# Patient Record
Sex: FEMALE | Race: WHITE | NOT HISPANIC OR LATINO | Employment: FULL TIME | URBAN - METROPOLITAN AREA
[De-identification: names, ages, dates, MRNs, and addresses within clinical notes are randomized per-mention and may not be internally consistent; named-entity substitution may affect disease eponyms.]

---

## 2017-03-30 ENCOUNTER — CONVERSION ENCOUNTER (OUTPATIENT)
Dept: FAMILY MEDICINE CLINIC | Facility: CLINIC | Age: 27
End: 2017-03-30

## 2017-03-30 ENCOUNTER — HOSPITAL ENCOUNTER (OUTPATIENT)
Dept: FAMILY MEDICINE CLINIC | Facility: CLINIC | Age: 27
Setting detail: SPECIMEN
Discharge: HOME | End: 2017-03-30
Attending: FAMILY MEDICINE | Admitting: FAMILY MEDICINE

## 2017-03-30 LAB
ALBUMIN SERPL-MCNC: 3.7 G/DL (ref 3.5–4.8)
ALBUMIN/GLOB SERPL: 1.1 {RATIO} (ref 1–1.7)
ALP SERPL-CCNC: 43 IU/L (ref 32–91)
ALT SERPL-CCNC: 20 IU/L (ref 14–54)
ANION GAP SERPL CALC-SCNC: 12.1 MMOL/L (ref 10–20)
AST SERPL-CCNC: 20 IU/L (ref 15–41)
BILIRUB SERPL-MCNC: 0.4 MG/DL (ref 0.3–1.2)
BUN SERPL-MCNC: 8 MG/DL (ref 8–20)
BUN/CREAT SERPL: 11.4 (ref 5.4–26.2)
CALCIUM SERPL-MCNC: 9 MG/DL (ref 8.9–10.3)
CHLORIDE SERPL-SCNC: 105 MMOL/L (ref 101–111)
CHOLEST SERPL-MCNC: 166 MG/DL
CHOLEST/HDLC SERPL: 3.6 {RATIO}
CONV CO2: 25 MMOL/L (ref 22–32)
CONV LDL CHOLESTEROL DIRECT: 103 MG/DL (ref 0–100)
CONV TOTAL PROTEIN: 7.1 G/DL (ref 6.1–7.9)
CREAT UR-MCNC: 0.7 MG/DL (ref 0.4–1)
GLOBULIN UR ELPH-MCNC: 3.4 G/DL (ref 2.5–3.8)
GLUCOSE SERPL-MCNC: 81 MG/DL (ref 65–99)
HDLC SERPL-MCNC: 46 MG/DL
LDLC/HDLC SERPL: 2.3 {RATIO}
LIPID INTERPRETATION: ABNORMAL
POTASSIUM SERPL-SCNC: 4.1 MMOL/L (ref 3.6–5.1)
SODIUM SERPL-SCNC: 138 MMOL/L (ref 136–144)
TRIGL SERPL-MCNC: 85 MG/DL
TSH SERPL-ACNC: 1.66 UIU/ML (ref 0.34–5.6)
VLDLC SERPL CALC-MCNC: 17.8 MG/DL

## 2017-09-25 ENCOUNTER — OFFICE VISIT (OUTPATIENT)
Dept: OBSTETRICS AND GYNECOLOGY | Age: 27
End: 2017-09-25

## 2017-09-25 VITALS
DIASTOLIC BLOOD PRESSURE: 80 MMHG | BODY MASS INDEX: 36.43 KG/M2 | SYSTOLIC BLOOD PRESSURE: 118 MMHG | HEIGHT: 69 IN | WEIGHT: 246 LBS

## 2017-09-25 DIAGNOSIS — Z31.69 ENCOUNTER FOR PRECONCEPTION CONSULTATION: ICD-10-CM

## 2017-09-25 DIAGNOSIS — Z01.419 ENCOUNTER FOR GYNECOLOGICAL EXAMINATION WITHOUT ABNORMAL FINDING: Primary | ICD-10-CM

## 2017-09-25 PROCEDURE — 99395 PREV VISIT EST AGE 18-39: CPT | Performed by: OBSTETRICS & GYNECOLOGY

## 2017-09-25 NOTE — PROGRESS NOTES
"Subjective     Chief Complaint   Patient presents with   • Gynecologic Exam     AC       History of Present Illness    Marisa Case is a 26 y.o.  who presents for annual exam. Patient has stopped her oral contraceptive pills and is using condoms for birth control.  She plans to start to try for pregnancy in January.  Patient works as a .  She is .  She is following a good diet and going to the gym.  She does have a history of chickenpox and no family history of any genetic disorders in  Her menses are regular every 28-30 days, lasting 4-7 days, dysmenorrhea mild, occurring first 1-2 days of flow   Obstetric History:  OB History      Para Term  AB Living    0 0 0 0 0 0    SAB TAB Ectopic Multiple Live Births    0 0 0 0          Menstrual History:     Patient's last menstrual period was 2017.         Current contraception: condoms  History of abnormal Pap smear: no  LPS 2016   Received Gardasil immunization: yes  Perform regular self breast exam: no  Family history of uterine or ovarian cancer: no  Family History of colon cancer: no  Family history of breast cancer: no    Mammogram: not indicated.  Colonoscopy: not indicated.  DEXA: not indicated.    Exercise: exercises 4 times a week GYM   Calcium/Vitamin D: adequate intake    The following portions of the patient's history were reviewed and updated as appropriate: allergies, current medications, past family history, past medical history, past social history, past surgical history and problem list.    Review of Systems    Review of Systems   Constitutional: Negative for fatigue.   Respiratory: Negative for shortness of breath.    Gastrointestinal: Negative for abdominal pain.   Genitourinary: Negative for dysuria.   Neurological: Negative for headaches.   Psychiatric/Behavioral: Negative for dysphoric mood.         Objective   Physical Exam    /80  Ht 69\" (175.3 cm)  Wt 246 lb (112 kg)  LMP 2017  " BMI 36.33 kg/m2    General:   alert, appears stated age, cooperative and mildly obese   Neck: no adenopathy and thyroid normal to palpation   Heart: regular rate and rhythm   Lungs: clear to auscultation bilaterally   Abdomen: soft, non-tender, without masses or organomegaly   Breast: inspection negative, no nipple discharge or bleeding, no masses or nodularity palpable   Vulva: normal, Bartholin's, Urethra, [place]'s normal   Vagina: normal mucosa, normal discharge   Cervix: no cervical motion tenderness and no lesions   Uterus: mobile, non-tender, normal shape and consistency   Adnexa: no mass, fullness, tenderness   Rectal: not indicated     Assessment/Plan   Marisa was seen today for gynecologic exam.    Diagnoses and all orders for this visit:    Encounter for gynecological examination without abnormal finding    Encounter for preconception consultation  -     Rubella Antibody, IgG  -     TSH  -     Hemoglobin A1c  -     Lipid Panel    I encouraged patient to have diet and exercise changes to bring down her body mass index.  We will check a conception labs today.  All questions answered.  Breast self exam technique reviewed and patient encouraged to perform self-exam monthly.  Discussed healthy lifestyle modifications.  Recommended 30 minutes of aerobic exercise five times per week.  Discussed calcium needs to prevent osteoporosis.

## 2017-09-26 LAB
CHOLEST SERPL-MCNC: 172 MG/DL (ref 0–200)
HBA1C MFR BLD: 5.3 % (ref 4.8–5.6)
HDLC SERPL-MCNC: 42 MG/DL (ref 40–60)
LDLC SERPL CALC-MCNC: 92 MG/DL (ref 0–100)
RUBV IGG SERPL IA-ACNC: 5.93 INDEX
TRIGL SERPL-MCNC: 191 MG/DL (ref 0–150)
TSH SERPL DL<=0.005 MIU/L-ACNC: 1.93 MIU/ML (ref 0.27–4.2)
VLDLC SERPL CALC-MCNC: 38.2 MG/DL (ref 5–40)

## 2017-09-27 ENCOUNTER — TELEPHONE (OUTPATIENT)
Dept: OBSTETRICS AND GYNECOLOGY | Age: 27
End: 2017-09-27

## 2017-09-27 NOTE — TELEPHONE ENCOUNTER
----- Message from Marcelo Valdez MD sent at 9/26/2017  5:57 PM EDT -----  Please notify that blood work is normal except for triglycerides but patient was not fasting.

## 2018-01-23 ENCOUNTER — CONVERSION ENCOUNTER (OUTPATIENT)
Dept: FAMILY MEDICINE CLINIC | Facility: CLINIC | Age: 28
End: 2018-01-23

## 2018-02-05 ENCOUNTER — INITIAL PRENATAL (OUTPATIENT)
Dept: OBSTETRICS AND GYNECOLOGY | Age: 28
End: 2018-02-05

## 2018-02-05 ENCOUNTER — PROCEDURE VISIT (OUTPATIENT)
Dept: OBSTETRICS AND GYNECOLOGY | Age: 28
End: 2018-02-05

## 2018-02-05 VITALS — DIASTOLIC BLOOD PRESSURE: 74 MMHG | SYSTOLIC BLOOD PRESSURE: 120 MMHG | WEIGHT: 239 LBS | BODY MASS INDEX: 35.29 KG/M2

## 2018-02-05 DIAGNOSIS — Z3A.09 PREGNANCY WITH 9 COMPLETED WEEKS GESTATION: Primary | ICD-10-CM

## 2018-02-05 DIAGNOSIS — O02.1 MISSED AB: ICD-10-CM

## 2018-02-05 DIAGNOSIS — Z11.3 SCREEN FOR SEXUALLY TRANSMITTED DISEASES: ICD-10-CM

## 2018-02-05 DIAGNOSIS — O36.80X0 ENCOUNTER TO DETERMINE FETAL VIABILITY OF PREGNANCY, SINGLE OR UNSPECIFIED FETUS: Primary | ICD-10-CM

## 2018-02-05 LAB — VZV IGG SER QL: NORMAL

## 2018-02-05 PROCEDURE — 99213 OFFICE O/P EST LOW 20 MIN: CPT | Performed by: OBSTETRICS & GYNECOLOGY

## 2018-02-05 PROCEDURE — 76817 TRANSVAGINAL US OBSTETRIC: CPT | Performed by: OBSTETRICS & GYNECOLOGY

## 2018-02-05 RX ORDER — DOCONEXENT, NIACINAMIDE, .ALPHA.-TOCOPHEROL ACETATE, DL-, CHOLECALCIFEROL, .BETA.-CAROTENE, ASCORBIC ACID, THIAMINE MONONITRATE, RIBOFLAVIN, PYRIDOXINE HYDROCHLORIDE, CYANOCOBALAMIN, IRON, ZINC OXIDE, CUPRIC OXIDE, POTASSIUM IODIDE, MAGNESIUM OXIDE, FOLIC ACID, AND LEVOMEFOLATE CALCIUM 200; 15; 20; 1000; 1100; 30; 1.6; 1.8; 2.5; 12; 29; 25; 2; 150; 20; .4; .6 MG/1; MG/1; [IU]/1; [IU]/1; [IU]/1; MG/1; MG/1; MG/1; MG/1; UG/1; MG/1; MG/1; MG/1; UG/1; MG/1; MG/1; MG/1
200 CAPSULE, LIQUID FILLED ORAL DAILY
Qty: 30 CAPSULE | Refills: 11 | Status: SHIPPED | OUTPATIENT
Start: 2018-02-05 | End: 2019-05-02 | Stop reason: SDUPTHER

## 2018-02-05 NOTE — PROGRESS NOTES
Subjective     Marisa Case is being seen today for her first obstetrical visit.  Patient is here today with her  Hunter.  Patient works as a .  She has been exercising and following a good diet.  She recently had the flu despite getting the flu vaccination.  She is feeling better now.  This is a planned pregnancy. She is at 9w5d gestation.     Menstrual History:  OB History      Para Term  AB Living    1 0 0 0 0 0    SAB TAB Ectopic Multiple Live Births    0 0 0 0            Patient's last menstrual period was 2017 (exact date).       The following portions of the patient's history were reviewed and updated as appropriate: allergies, current medications, past family history, past medical history, past social history, past surgical history and problem list.    Review of Systems  No vaginal bleeding or cramping.  Positive fatigue.  Mild nausea but no vomiting.      Objective     /74  Wt 108 kg (239 lb)  LMP 2017 (Exact Date)  BMI 35.29 kg/m2  General appearance: alert, appears stated age and cooperative  Eyes: negative findings: lids and lashes normal, conjunctivae and sclerae normal and corneas clear  Neck: no adenopathy, supple, symmetrical, trachea midline and thyroid not enlarged, symmetric, no tenderness/mass/nodules  Lungs: clear to auscultation bilaterally  Heart: regular rate and rhythm  Pelvic: cervix normal in appearance, external genitalia normal, no adnexal masses or tenderness, no cervical motion tenderness and Pelvic exam is difficult to evaluate due to body habitus.  The uterus sits high in the pelvis.  Extremities: extremities normal, atraumatic, no cyanosis or edema        Ultrasound shows a 7 week missed AB.  No cardiac activity is seen.    The crown-rump length is 94 mm no Doppler flow.    Assessment  7 week missed AB        Plan     Initial labs drawn.  Miscarriage was discussed in detail with the patient and her .  We  discussed that miscarriage occurs in about 15% of pregnancies.  Patient was given the option of expectant management, Cytotec or D&C.  Patient wishes to proceed with D&C due to leaving town for a trip in about 1 week.  We will schedule surgery.  The risk and benefits of surgery including but not limited to risk of anesthesia, bleeding, infection, uterine perforation and scarring of the endometrium were discussed.  Suction D&C will be planned.  Patient believes she is Rh-.  Labs were sent off today.  We discussed need for Preston exam with bleeding or D&C.    80% of 40 min visit spent on counseling and coordination of care.

## 2018-02-06 ENCOUNTER — TELEPHONE (OUTPATIENT)
Dept: OBSTETRICS AND GYNECOLOGY | Age: 28
End: 2018-02-06

## 2018-02-06 PROBLEM — O26.899 RH NEGATIVE, MATERNAL: Status: ACTIVE | Noted: 2018-02-06

## 2018-02-06 PROBLEM — Z67.91 RH NEGATIVE, MATERNAL: Status: ACTIVE | Noted: 2018-02-06

## 2018-02-06 LAB
ABO GROUP BLD: (no result)
BASOPHILS # BLD AUTO: 0 X10E3/UL (ref 0–0.2)
BASOPHILS NFR BLD AUTO: 0 %
BLD GP AB SCN SERPL QL: NEGATIVE
EOSINOPHIL # BLD AUTO: 0.1 X10E3/UL (ref 0–0.4)
EOSINOPHIL NFR BLD AUTO: 1 %
ERYTHROCYTE [DISTWIDTH] IN BLOOD BY AUTOMATED COUNT: 14.4 % (ref 12.3–15.4)
HBV SURFACE AG SERPL QL IA: NEGATIVE
HCT VFR BLD AUTO: 37.6 % (ref 34–46.6)
HGB BLD-MCNC: 12.7 G/DL (ref 11.1–15.9)
HIV 1+2 AB+HIV1 P24 AG SERPL QL IA: NON REACTIVE
IMM GRANULOCYTES # BLD: 0 X10E3/UL (ref 0–0.1)
IMM GRANULOCYTES NFR BLD: 0 %
LYMPHOCYTES # BLD AUTO: 2.8 X10E3/UL (ref 0.7–3.1)
LYMPHOCYTES NFR BLD AUTO: 22 %
MCH RBC QN AUTO: 29.2 PG (ref 26.6–33)
MCHC RBC AUTO-ENTMCNC: 33.8 G/DL (ref 31.5–35.7)
MCV RBC AUTO: 86 FL (ref 79–97)
MONOCYTES # BLD AUTO: 0.7 X10E3/UL (ref 0.1–0.9)
MONOCYTES NFR BLD AUTO: 6 %
NEUTROPHILS # BLD AUTO: 8.8 X10E3/UL (ref 1.4–7)
NEUTROPHILS NFR BLD AUTO: 71 %
PLATELET # BLD AUTO: 462 X10E3/UL (ref 150–379)
RBC # BLD AUTO: 4.35 X10E6/UL (ref 3.77–5.28)
RH BLD: NEGATIVE
RPR SER QL: NON REACTIVE
RUBV IGG SERPL IA-ACNC: 6.94 INDEX
WBC # BLD AUTO: 12.5 X10E3/UL (ref 3.4–10.8)

## 2018-02-06 PROCEDURE — S0260 H&P FOR SURGERY: HCPCS | Performed by: OBSTETRICS & GYNECOLOGY

## 2018-02-06 NOTE — H&P
Patient Care Team:  Sabina Mendoza DO as PCP - General (Family Medicine)    Chief complaint missed AB    Subjective     History of Present Illness  patient is a 27-year-old  1 para 0 with a 7 week missed AB.  Patient came in for her first pregnancy visit.  She was supposed to be about 9-1/2 weeks pregnant but ultrasound revealed a 7 week fetal pole with no cardiac activity.  Patient has not had bleeding or pain.  She is Rh- and will require O Wagner.  Patient is otherwise healthy.     Review of Systems   Constitutional: Negative.    Respiratory: Negative.    Cardiovascular: Negative.    Genitourinary: Negative.         Past Medical History:   Diagnosis Date   • Dysmenorrhea    • Miscarriage      Past Surgical History:   Procedure Laterality Date   • WISDOM TOOTH EXTRACTION       Family History   Problem Relation Age of Onset   • No Known Problems Mother    • No Known Problems Father    • Cancer Maternal Grandfather    • Malig Hyperthermia Neg Hx      Social History   Substance Use Topics   • Smoking status: Never Smoker   • Smokeless tobacco: None   • Alcohol use No     No prescriptions prior to admission.     Allergies:  Penicillins    Objective      Vital Signs       Physical Exam   Constitutional: She appears well-developed and well-nourished.   Cardiovascular: Normal rate and regular rhythm.    Pulmonary/Chest: Effort normal and breath sounds normal.   Abdominal: Soft. Bowel sounds are normal.   Genitourinary:   Genitourinary Comments: Uterus at times the pelvis is enlarged to about 7 weeks size.   Skin: Skin is warm and dry.   Psychiatric: She has a normal mood and affect. Her behavior is normal.       Results Review:   I reviewed the patient's new clinical results.      Assessment/Plan     Principal Problem:    Missed ab    Patient Active Problem List   Diagnosis   • Missed ab   • Rh negative, maternal         Assessment & Plan  Patient was given the option of expectant management  versus Cytotec versus suction D&C.  Patient is leaving town at the end of the week and is anxious about significant bleeding when she leaves.  She would like to proceed with D&C.  We discussed the procedure in detail including risk and benefits.  Patient will also need for Wagner since she is Rh-.  I discussed the patients findings and my recommendations with patient and family    Marcelo Valdez MD  02/06/18  6:49 PM

## 2018-02-06 NOTE — TELEPHONE ENCOUNTER
----- Message from Marcelo Valdez MD sent at 2/6/2018 12:50 PM EST -----  Please notify blood work is normal.  Her blood type is A-.  Patient has a D&C for miscarriage tomorrow.

## 2018-02-07 ENCOUNTER — HOSPITAL ENCOUNTER (OUTPATIENT)
Facility: HOSPITAL | Age: 28
Setting detail: HOSPITAL OUTPATIENT SURGERY
Discharge: HOME | End: 2018-02-07
Attending: OBSTETRICS & GYNECOLOGY | Admitting: OBSTETRICS & GYNECOLOGY

## 2018-02-07 ENCOUNTER — ANESTHESIA EVENT (OUTPATIENT)
Dept: PERIOP | Facility: HOSPITAL | Age: 28
End: 2018-02-07

## 2018-02-07 ENCOUNTER — ANESTHESIA (OUTPATIENT)
Dept: PERIOP | Facility: HOSPITAL | Age: 28
End: 2018-02-07

## 2018-02-07 VITALS
TEMPERATURE: 98.4 F | SYSTOLIC BLOOD PRESSURE: 131 MMHG | RESPIRATION RATE: 18 BRPM | HEART RATE: 86 BPM | DIASTOLIC BLOOD PRESSURE: 81 MMHG | OXYGEN SATURATION: 97 %

## 2018-02-07 DIAGNOSIS — O02.1 MISSED AB: ICD-10-CM

## 2018-02-07 LAB
BACTERIA UR CULT: NORMAL
BACTERIA UR CULT: NORMAL

## 2018-02-07 PROCEDURE — 25010000002 FENTANYL CITRATE (PF) 100 MCG/2ML SOLUTION: Performed by: NURSE ANESTHETIST, CERTIFIED REGISTERED

## 2018-02-07 PROCEDURE — 25010000002 DEXAMETHASONE PER 1 MG: Performed by: NURSE ANESTHETIST, CERTIFIED REGISTERED

## 2018-02-07 PROCEDURE — 25010000002 KETOROLAC TROMETHAMINE PER 15 MG: Performed by: NURSE ANESTHETIST, CERTIFIED REGISTERED

## 2018-02-07 PROCEDURE — 25010000002 MIDAZOLAM PER 1 MG: Performed by: ANESTHESIOLOGY

## 2018-02-07 PROCEDURE — 59820 CARE OF MISCARRIAGE: CPT | Performed by: OBSTETRICS & GYNECOLOGY

## 2018-02-07 PROCEDURE — 25010000002 PROPOFOL 10 MG/ML EMULSION: Performed by: NURSE ANESTHETIST, CERTIFIED REGISTERED

## 2018-02-07 PROCEDURE — 88305 TISSUE EXAM BY PATHOLOGIST: CPT | Performed by: OBSTETRICS & GYNECOLOGY

## 2018-02-07 PROCEDURE — 25010000002 RHO D IMMUNE GLOBULIN 1500 UNIT/2ML SOLUTION PREFILLED SYRINGE: Performed by: OBSTETRICS & GYNECOLOGY

## 2018-02-07 PROCEDURE — 25010000002 ONDANSETRON PER 1 MG: Performed by: NURSE ANESTHETIST, CERTIFIED REGISTERED

## 2018-02-07 RX ORDER — OXYCODONE HYDROCHLORIDE AND ACETAMINOPHEN 5; 325 MG/1; MG/1
1 TABLET ORAL ONCE AS NEEDED
Status: DISCONTINUED | OUTPATIENT
Start: 2018-02-07 | End: 2018-02-07 | Stop reason: HOSPADM

## 2018-02-07 RX ORDER — KETOROLAC TROMETHAMINE 30 MG/ML
INJECTION, SOLUTION INTRAMUSCULAR; INTRAVENOUS AS NEEDED
Status: DISCONTINUED | OUTPATIENT
Start: 2018-02-07 | End: 2018-02-07 | Stop reason: SURG

## 2018-02-07 RX ORDER — ACETAMINOPHEN 650 MG/1
650 SUPPOSITORY RECTAL ONCE AS NEEDED
Status: DISCONTINUED | OUTPATIENT
Start: 2018-02-07 | End: 2018-02-07 | Stop reason: HOSPADM

## 2018-02-07 RX ORDER — PROMETHAZINE HYDROCHLORIDE 25 MG/ML
6.25 INJECTION, SOLUTION INTRAMUSCULAR; INTRAVENOUS ONCE AS NEEDED
Status: DISCONTINUED | OUTPATIENT
Start: 2018-02-07 | End: 2018-02-07 | Stop reason: HOSPADM

## 2018-02-07 RX ORDER — DIPHENHYDRAMINE HYDROCHLORIDE 50 MG/ML
12.5 INJECTION INTRAMUSCULAR; INTRAVENOUS
Status: DISCONTINUED | OUTPATIENT
Start: 2018-02-07 | End: 2018-02-07 | Stop reason: HOSPADM

## 2018-02-07 RX ORDER — PROMETHAZINE HYDROCHLORIDE 25 MG/1
25 SUPPOSITORY RECTAL ONCE AS NEEDED
Status: DISCONTINUED | OUTPATIENT
Start: 2018-02-07 | End: 2018-02-07 | Stop reason: HOSPADM

## 2018-02-07 RX ORDER — IBUPROFEN 600 MG/1
600 TABLET ORAL EVERY 6 HOURS PRN
Qty: 30 TABLET | Refills: 0 | Status: SHIPPED | OUTPATIENT
Start: 2018-02-07 | End: 2018-02-23

## 2018-02-07 RX ORDER — FENTANYL CITRATE 50 UG/ML
50 INJECTION, SOLUTION INTRAMUSCULAR; INTRAVENOUS
Status: DISCONTINUED | OUTPATIENT
Start: 2018-02-07 | End: 2018-02-07 | Stop reason: HOSPADM

## 2018-02-07 RX ORDER — MAGNESIUM HYDROXIDE 1200 MG/15ML
LIQUID ORAL AS NEEDED
Status: DISCONTINUED | OUTPATIENT
Start: 2018-02-07 | End: 2018-02-07 | Stop reason: HOSPADM

## 2018-02-07 RX ORDER — LIDOCAINE HYDROCHLORIDE 10 MG/ML
0.5 INJECTION, SOLUTION EPIDURAL; INFILTRATION; INTRACAUDAL; PERINEURAL ONCE AS NEEDED
Status: DISCONTINUED | OUTPATIENT
Start: 2018-02-07 | End: 2018-02-07 | Stop reason: HOSPADM

## 2018-02-07 RX ORDER — NALBUPHINE HCL 10 MG/ML
2 AMPUL (ML) INJECTION EVERY 4 HOURS PRN
Status: DISCONTINUED | OUTPATIENT
Start: 2018-02-07 | End: 2018-02-07 | Stop reason: HOSPADM

## 2018-02-07 RX ORDER — DEXAMETHASONE SODIUM PHOSPHATE 10 MG/ML
INJECTION INTRAMUSCULAR; INTRAVENOUS AS NEEDED
Status: DISCONTINUED | OUTPATIENT
Start: 2018-02-07 | End: 2018-02-07 | Stop reason: SURG

## 2018-02-07 RX ORDER — MIDAZOLAM HYDROCHLORIDE 1 MG/ML
2 INJECTION INTRAMUSCULAR; INTRAVENOUS
Status: DISCONTINUED | OUTPATIENT
Start: 2018-02-07 | End: 2018-02-07 | Stop reason: HOSPADM

## 2018-02-07 RX ORDER — FENTANYL CITRATE 50 UG/ML
INJECTION, SOLUTION INTRAMUSCULAR; INTRAVENOUS AS NEEDED
Status: DISCONTINUED | OUTPATIENT
Start: 2018-02-07 | End: 2018-02-07 | Stop reason: SURG

## 2018-02-07 RX ORDER — FAMOTIDINE 10 MG/ML
20 INJECTION, SOLUTION INTRAVENOUS ONCE
Status: COMPLETED | OUTPATIENT
Start: 2018-02-07 | End: 2018-02-07

## 2018-02-07 RX ORDER — NALOXONE HCL 0.4 MG/ML
0.4 VIAL (ML) INJECTION AS NEEDED
Status: DISCONTINUED | OUTPATIENT
Start: 2018-02-07 | End: 2018-02-07 | Stop reason: HOSPADM

## 2018-02-07 RX ORDER — MIDAZOLAM HYDROCHLORIDE 1 MG/ML
1 INJECTION INTRAMUSCULAR; INTRAVENOUS
Status: DISCONTINUED | OUTPATIENT
Start: 2018-02-07 | End: 2018-02-07 | Stop reason: HOSPADM

## 2018-02-07 RX ORDER — SODIUM CHLORIDE, SODIUM LACTATE, POTASSIUM CHLORIDE, CALCIUM CHLORIDE 600; 310; 30; 20 MG/100ML; MG/100ML; MG/100ML; MG/100ML
9 INJECTION, SOLUTION INTRAVENOUS CONTINUOUS
Status: DISCONTINUED | OUTPATIENT
Start: 2018-02-07 | End: 2018-02-07 | Stop reason: HOSPADM

## 2018-02-07 RX ORDER — PROMETHAZINE HYDROCHLORIDE 25 MG/1
25 TABLET ORAL ONCE AS NEEDED
Status: DISCONTINUED | OUTPATIENT
Start: 2018-02-07 | End: 2018-02-07 | Stop reason: HOSPADM

## 2018-02-07 RX ORDER — OXYCODONE HYDROCHLORIDE AND ACETAMINOPHEN 5; 325 MG/1; MG/1
1 TABLET ORAL EVERY 4 HOURS PRN
Qty: 10 TABLET | Refills: 0 | Status: SHIPPED | OUTPATIENT
Start: 2018-02-07 | End: 2018-02-23

## 2018-02-07 RX ORDER — SODIUM CHLORIDE 0.9 % (FLUSH) 0.9 %
1-10 SYRINGE (ML) INJECTION AS NEEDED
Status: DISCONTINUED | OUTPATIENT
Start: 2018-02-07 | End: 2018-02-07 | Stop reason: HOSPADM

## 2018-02-07 RX ORDER — HYDRALAZINE HYDROCHLORIDE 20 MG/ML
5 INJECTION INTRAMUSCULAR; INTRAVENOUS
Status: DISCONTINUED | OUTPATIENT
Start: 2018-02-07 | End: 2018-02-07 | Stop reason: HOSPADM

## 2018-02-07 RX ORDER — ACETAMINOPHEN 325 MG/1
650 TABLET ORAL ONCE
Status: COMPLETED | OUTPATIENT
Start: 2018-02-07 | End: 2018-02-07

## 2018-02-07 RX ORDER — PROPOFOL 10 MG/ML
VIAL (ML) INTRAVENOUS AS NEEDED
Status: DISCONTINUED | OUTPATIENT
Start: 2018-02-07 | End: 2018-02-07 | Stop reason: SURG

## 2018-02-07 RX ORDER — LIDOCAINE HYDROCHLORIDE 20 MG/ML
INJECTION, SOLUTION INFILTRATION; PERINEURAL AS NEEDED
Status: DISCONTINUED | OUTPATIENT
Start: 2018-02-07 | End: 2018-02-07 | Stop reason: SURG

## 2018-02-07 RX ORDER — ACETAMINOPHEN 325 MG/1
650 TABLET ORAL ONCE AS NEEDED
Status: DISCONTINUED | OUTPATIENT
Start: 2018-02-07 | End: 2018-02-07 | Stop reason: HOSPADM

## 2018-02-07 RX ORDER — ONDANSETRON 2 MG/ML
INJECTION INTRAMUSCULAR; INTRAVENOUS AS NEEDED
Status: DISCONTINUED | OUTPATIENT
Start: 2018-02-07 | End: 2018-02-07 | Stop reason: SURG

## 2018-02-07 RX ORDER — NALBUPHINE HCL 10 MG/ML
10 AMPUL (ML) INJECTION EVERY 4 HOURS PRN
Status: DISCONTINUED | OUTPATIENT
Start: 2018-02-07 | End: 2018-02-07 | Stop reason: HOSPADM

## 2018-02-07 RX ADMIN — PROPOFOL 200 MG: 10 INJECTION, EMULSION INTRAVENOUS at 12:04

## 2018-02-07 RX ADMIN — FENTANYL CITRATE 50 MCG: 50 INJECTION INTRAMUSCULAR; INTRAVENOUS at 12:04

## 2018-02-07 RX ADMIN — FENTANYL CITRATE 25 MCG: 50 INJECTION INTRAMUSCULAR; INTRAVENOUS at 12:24

## 2018-02-07 RX ADMIN — HUMAN RHO(D) IMMUNE GLOBULIN 1500 UNITS: 1500 SOLUTION INTRAMUSCULAR; INTRAVENOUS at 13:11

## 2018-02-07 RX ADMIN — DEXAMETHASONE SODIUM PHOSPHATE 8 MG: 10 INJECTION INTRAMUSCULAR; INTRAVENOUS at 12:10

## 2018-02-07 RX ADMIN — LIDOCAINE HYDROCHLORIDE 100 MG: 20 INJECTION, SOLUTION INFILTRATION; PERINEURAL at 12:04

## 2018-02-07 RX ADMIN — SODIUM CHLORIDE, POTASSIUM CHLORIDE, SODIUM LACTATE AND CALCIUM CHLORIDE 9 ML/HR: 600; 310; 30; 20 INJECTION, SOLUTION INTRAVENOUS at 11:21

## 2018-02-07 RX ADMIN — ONDANSETRON 4 MG: 2 INJECTION INTRAMUSCULAR; INTRAVENOUS at 12:20

## 2018-02-07 RX ADMIN — FENTANYL CITRATE 25 MCG: 50 INJECTION INTRAMUSCULAR; INTRAVENOUS at 12:13

## 2018-02-07 RX ADMIN — KETOROLAC TROMETHAMINE 30 MG: 30 INJECTION, SOLUTION INTRAMUSCULAR; INTRAVENOUS at 12:20

## 2018-02-07 RX ADMIN — FAMOTIDINE 20 MG: 10 INJECTION INTRAVENOUS at 11:22

## 2018-02-07 RX ADMIN — MIDAZOLAM 2 MG: 1 INJECTION INTRAMUSCULAR; INTRAVENOUS at 11:22

## 2018-02-07 RX ADMIN — ACETAMINOPHEN 650 MG: 325 TABLET, FILM COATED ORAL at 11:21

## 2018-02-07 NOTE — PLAN OF CARE
Problem: Patient Care Overview (Adult)  Goal: Plan of Care Review  Outcome: Ongoing (interventions implemented as appropriate)   02/07/18 1001   Coping/Psychosocial Response Interventions   Plan Of Care Reviewed With patient   Patient Care Overview   Progress improving     Goal: Discharge Needs Assessment  Outcome: Ongoing (interventions implemented as appropriate)   02/07/18 1001   Discharge Needs Assessment   Concerns To Be Addressed no discharge needs identified   Equipment Needed After Discharge none   Self-Care   Equipment Currently Used at Home none

## 2018-02-07 NOTE — OP NOTE
Pre-operative Diagnosis: Seven-week missed AB  Post-operative Diagnosis: Same  Procedure: SuctionD&C  Anesthesia: Spinal  Surgeon(s): José Miguel  Findings: Anteverted uterus on exam under anesthesia.  No adnexal masses are palpated.  The uterus feels approximately 8 weeks size.  Products of conception are seen in the suction tubing.  Complications: none  Specimens: products of conception  EBL: 400ml    Description of Procedure:  The patient was taken to the Operating Room where anesthesia was found to be adequate. The patient was then placed in the dorsal lithotomy position and prepped and draped in the usual sterile fashion. A red rubber catheter was used to drain the urine from the bladder.  A weighted speculum was placed in the vagina and the cervix was visualized. The cervix grasped with the Allis clamp. The cervix was gradually dilated. The suction currettage 8mm size was passed approx 5 times gently, removing tissue. Sharp currette was done approximates 3 passes, gently.   The Allis clamp was removed, and the cervix was found to be hemostatic.   All instruments and retractors were removed. All sponge, instrument and needle counts were noted to be correct. The patient was taken to recovery in stable condition.

## 2018-02-07 NOTE — PLAN OF CARE
Problem: Patient Care Overview (Adult)  Goal: Plan of Care Review  Outcome: Outcome(s) achieved Date Met: 02/07/18    Goal: Discharge Needs Assessment  Outcome: Outcome(s) achieved Date Met: 02/07/18      Problem: Perioperative Period (Adult)  Goal: Signs and Symptoms of Listed Potential Problems Will be Absent or Manageable (Perioperative Period)  Outcome: Outcome(s) achieved Date Met: 02/07/18

## 2018-02-07 NOTE — ANESTHESIA PROCEDURE NOTES
Airway  Urgency: elective    Airway not difficult    General Information and Staff    Patient location during procedure: OR  Anesthesiologist: RENDER, ZAFAR RAY  CRNA: LYNNETTE TATUM    Indications and Patient Condition  Indications for airway management: airway protection    Preoxygenated: yes (Pt pre-O2 with 100% O2)  MILS not maintained throughout  Mask difficulty assessment: 0 - not attempted    Final Airway Details  Final airway type: supraglottic airway      Successful airway: classic  Size 4    Number of attempts at approach: 1    Additional Comments  ATOLMA x1. No change in dentition. + ETCO2. Airway seal pressure <20cm H2O.

## 2018-02-07 NOTE — ANESTHESIA POSTPROCEDURE EVALUATION
Patient: Marisa Case    Procedure Summary     Date Anesthesia Start Anesthesia Stop Room / Location    02/07/18 1203 1238  [facility] /  [facility]       Procedure Diagnosis Surgeon Provider    DILATATION AND CURETTAGE WITH SUCTION (N/A Vagina) Missed ab  (Missed ab [O02.1]) MD Anthony Gardner MD          Anesthesia Type: general  Last vitals  BP   131/81 (02/07/18 1327)   Temp   36.9 °C (98.4 °F) (02/07/18 1234)   Pulse   86 (02/07/18 1327)   Resp   18 (02/07/18 1327)     SpO2   97 % (02/07/18 1327)     Post Anesthesia Care and Evaluation    Patient participation: complete - patient participated  Level of consciousness: awake  Pain management: adequate  Airway patency: patent  Anesthetic complications: No anesthetic complications    Cardiovascular status: acceptable  Respiratory status: acceptable  Hydration status: acceptable

## 2018-02-07 NOTE — ANESTHESIA PREPROCEDURE EVALUATION
Anesthesia Evaluation     no history of anesthetic complications:  NPO Solid Status: > 8 hours             Airway   Mallampati: II  TM distance: >3 FB  Neck ROM: full  no difficulty expected  Dental - normal exam     Pulmonary - negative pulmonary ROS and normal exam   Cardiovascular - negative cardio ROS and normal exam    Rhythm: regular        Neuro/Psych- negative ROS  GI/Hepatic/Renal/Endo - negative ROS     Musculoskeletal     Abdominal    Substance History      OB/GYN    (+) Pregnant,     Comment: Missed AB      Other                        Anesthesia Plan    ASA 2     general   (  D/W R&B of GA including but not limited to: heart, lung, liver, kidney, neurologic problems, positioning injuries, dental damage, corneal abrasion and TMJ.  .)  intravenous induction   Anesthetic plan and risks discussed with patient.

## 2018-02-07 NOTE — PLAN OF CARE
Problem: Patient Care Overview (Adult)  Goal: Plan of Care Review  Outcome: Ongoing (interventions implemented as appropriate)   02/07/18 1319   Coping/Psychosocial Response Interventions   Plan Of Care Reviewed With patient   Patient Care Overview   Progress improving     Goal: Adult Individualization and Mutuality  Outcome: Ongoing (interventions implemented as appropriate)    Goal: Discharge Needs Assessment  Outcome: Ongoing (interventions implemented as appropriate)   02/07/18 1319   Discharge Needs Assessment   Concerns To Be Addressed no discharge needs identified       Problem: Perioperative Period (Adult)  Goal: Signs and Symptoms of Listed Potential Problems Will be Absent or Manageable (Perioperative Period)  Outcome: Ongoing (interventions implemented as appropriate)   02/07/18 1319   Perioperative Period   Problems Assessed (Perioperative Period) all   Problems Present (Perioperative Period) pain

## 2018-02-07 NOTE — PLAN OF CARE
Problem: Perioperative Period (Adult)  Goal: Signs and Symptoms of Listed Potential Problems Will be Absent or Manageable (Perioperative Period)  Outcome: Ongoing (interventions implemented as appropriate)   02/07/18 1001   Perioperative Period   Problems Assessed (Perioperative Period) all   Problems Present (Perioperative Period) none

## 2018-02-08 LAB
C TRACH RRNA SPEC QL NAA+PROBE: NEGATIVE
N GONORRHOEA RRNA SPEC QL NAA+PROBE: NEGATIVE

## 2018-02-09 ENCOUNTER — TELEPHONE (OUTPATIENT)
Dept: OBSTETRICS AND GYNECOLOGY | Age: 28
End: 2018-02-09

## 2018-02-09 DIAGNOSIS — O02.0 MOLAR PREGNANCY: Primary | ICD-10-CM

## 2018-02-09 NOTE — TELEPHONE ENCOUNTER
----- Message from Marcelo Valdez MD sent at 2/8/2018  7:11 PM EST -----  The patient has a possible molar pregnancy. I told the patient to come in for a quant BHCG on Friday and next Friday. Please order.

## 2018-02-10 ENCOUNTER — RESULTS ENCOUNTER (OUTPATIENT)
Dept: OBSTETRICS AND GYNECOLOGY | Age: 28
End: 2018-02-10

## 2018-02-10 DIAGNOSIS — O02.1 MISSED AB: ICD-10-CM

## 2018-02-10 LAB — HCG INTACT+B SERPL-ACNC: NORMAL MIU/ML

## 2018-02-13 PROBLEM — O02.0 MOLAR PREGNANCY: Status: RESOLVED | Noted: 2018-02-13 | Resolved: 2018-02-13

## 2018-02-13 PROBLEM — O08.89 PARTIAL MOLAR PREGNANCY: Status: ACTIVE | Noted: 2018-02-13

## 2018-02-13 PROBLEM — O02.0 MOLAR PREGNANCY: Status: ACTIVE | Noted: 2018-02-13

## 2018-02-13 LAB
LAB AP CASE REPORT: NORMAL
LAB AP INTRADEPARTMENTAL CONSULT: NORMAL
Lab: NORMAL
PATH REPORT.FINAL DX SPEC: NORMAL
PATH REPORT.GROSS SPEC: NORMAL

## 2018-02-16 ENCOUNTER — RESULTS ENCOUNTER (OUTPATIENT)
Dept: OBSTETRICS AND GYNECOLOGY | Age: 28
End: 2018-02-16

## 2018-02-16 DIAGNOSIS — O02.0 MOLAR PREGNANCY: ICD-10-CM

## 2018-02-16 LAB — HCG INTACT+B SERPL-ACNC: 551.7 MIU/ML

## 2018-02-19 ENCOUNTER — TELEPHONE (OUTPATIENT)
Dept: OBSTETRICS AND GYNECOLOGY | Age: 28
End: 2018-02-19

## 2018-02-19 DIAGNOSIS — O02.1 MISSED AB: Primary | ICD-10-CM

## 2018-02-19 NOTE — TELEPHONE ENCOUNTER
----- Message from Marcelo Valdez MD sent at 2/19/2018  8:22 AM EST -----  Please notify that beta hCG has fallen to 551.  This is a good decrease.  We will still need to check weekly have patient come in for quantitative beta hCG next Friday.

## 2018-02-21 ENCOUNTER — TELEPHONE (OUTPATIENT)
Dept: OBSTETRICS AND GYNECOLOGY | Age: 28
End: 2018-02-21

## 2018-02-21 NOTE — TELEPHONE ENCOUNTER
----- Message from Jeannette Alcantar MA sent at 2/21/2018 10:40 AM EST -----  Regarding: FW: Non-Urgent Medical Question  Contact: [phone]      ----- Message -----     From: Marisa Case     Sent: 2/21/2018  10:15 AM       [facility]  Subject: RE: Non-Urgent Medical Question                  Thank you for your response. I understand that it is difficult to know the effects of everything because this is not something that is routinely studied. My boss and I had decided that once I was pregnant to discontinue the methods because of this as a safety precaution. 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 We were just talking about this situation the other day after I had had the D&C performed and discovered the molar pregnancy. Since it will be quite some time before we can attempt another pregnancy, I was hoping to be able to resume some of my previous responsibilities for a small amount of time. Of course, I would discontinue them if I were to become pregnant again. My boss and I just weren't sure if we should wait till my hcg levels had dropped completely or not since I am having those levels monitored. I do use personal protective equipment (gloves, glasses, lab coat) and a ventilation silva when  performing these methods. I will pass along the information that you have given me to my boss. For now, I will not consult a high , but I will let you know if I change my mind on this. Thank you.  ----- Message -----  From: Marcelo Valdez MD  Sent: 2/21/2018  8:06 AM EST  To: Marisa Case  Subject: RE: Non-Urgent Medical Question    Marisa it is fairly difficult to know the effects of chemicals in pregnancy because there are not randomized control studies where women are exposed to chemicals and long-term effects are followed it for many years.    It very difficult to say anything is  "\"okay\".  If there is a way to avoid chemical exposures in pregnancy by doing different duties at her job that would be great.  I can also refer you to a high  to see if they have any information but I still think it would be difficult to quantify any effects.    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 When I found out that I was pregnant, my boss and I had decided that I would stop performing two methods as a safety precaution due to the chemicals that I was working with. The first method is an Ether/Fat Analysis that uses Ammonium Hydroxide, Ethyl Ether, and Petroleum Ether. The second method is a Metabolizable Urea Nitrogen Analysis that uses Trichloroacetic Acid. I wanted to double check with you to see what your suggestion might be regarding me performing these methods again with these chemicals?    Thank you,  Marisa"

## 2018-02-23 ENCOUNTER — OFFICE VISIT (OUTPATIENT)
Dept: OBSTETRICS AND GYNECOLOGY | Age: 28
End: 2018-02-23

## 2018-02-23 VITALS
HEIGHT: 68 IN | DIASTOLIC BLOOD PRESSURE: 80 MMHG | WEIGHT: 236 LBS | BODY MASS INDEX: 35.77 KG/M2 | SYSTOLIC BLOOD PRESSURE: 124 MMHG

## 2018-02-23 DIAGNOSIS — O08.89 PARTIAL MOLAR PREGNANCY: Primary | ICD-10-CM

## 2018-02-23 PROBLEM — O02.1 MISSED AB: Status: RESOLVED | Noted: 2018-02-05 | Resolved: 2018-02-23

## 2018-02-23 LAB — HCG INTACT+B SERPL-ACNC: 46.65 MIU/ML

## 2018-02-23 PROCEDURE — 99024 POSTOP FOLLOW-UP VISIT: CPT | Performed by: OBSTETRICS & GYNECOLOGY

## 2018-02-23 RX ORDER — LEVONORGESTREL AND ETHINYL ESTRADIOL 0.1-0.02MG
1 KIT ORAL DAILY
Qty: 28 TABLET | Refills: 12 | Status: SHIPPED | OUTPATIENT
Start: 2018-02-23 | End: 2018-08-27

## 2018-02-23 NOTE — PROGRESS NOTES
"Subjective     Marisa Case is a 27 y.o. female who presents to the office post D and C for MAB - path molar pregnancy.  Initial beta hCG went pathology report was seen was 13,000.  Follow-up one week later was 500.  Patient will have a third beta-hCG sent off today.  She's had a mild amount of spotting but is not having any pain.  She plans to use condoms for birth control.         Review of Systems  Pertinent items are noted in HPI.      Objective     /80  Ht 172.7 cm (68\")  Wt 107 kg (236 lb)  LMP 11/29/2017 (Exact Date)  Breastfeeding? Unknown  BMI 35.88 kg/m2  General:  alert, appears stated age and cooperative   Abdomen:    Incision:           Assessment     Partial molar pregnancy.  We will plan to follow beta hCGs down to 0 with weekly levels.  When the level has been 0 for 3 consistent weeks we will space out to once a month.  Patient will need to wait for 6 months after that point to try for pregnancy.  I recommend that she start oral contraceptive pills.   "

## 2018-02-27 ENCOUNTER — TELEPHONE (OUTPATIENT)
Dept: OBSTETRICS AND GYNECOLOGY | Age: 28
End: 2018-02-27

## 2018-02-27 DIAGNOSIS — O02.1 MISSED AB: Primary | ICD-10-CM

## 2018-02-27 NOTE — TELEPHONE ENCOUNTER
----- Message from Marcelo Valdez MD sent at 2/25/2018  9:21 AM EST -----  I notified the patient that her beta hCG is dropped to 46.  Please have the patient repeat beta hCG in 1 week.

## 2018-03-02 ENCOUNTER — TELEPHONE (OUTPATIENT)
Dept: OBSTETRICS AND GYNECOLOGY | Age: 28
End: 2018-03-02

## 2018-03-02 NOTE — TELEPHONE ENCOUNTER
----- Message from Marcelo Valdez MD sent at 3/2/2018  8:32 AM EST -----  Please notify beta hCG is down to 11.  She is being followed for post molar surveillance.  She will need a repeat beta hCG in 1 week.

## 2018-03-09 ENCOUNTER — TELEPHONE (OUTPATIENT)
Dept: OBSTETRICS AND GYNECOLOGY | Age: 28
End: 2018-03-09

## 2018-03-09 DIAGNOSIS — O09.A0 H/O MOLAR PREGNANCY, ANTEPARTUM: Primary | ICD-10-CM

## 2018-03-09 LAB — HCG INTACT+B SERPL-ACNC: 3.38 MIU/ML

## 2018-03-09 NOTE — TELEPHONE ENCOUNTER
----- Message from Marcelo Valdez MD sent at 3/9/2018  7:23 AM EST -----  Please notify beta hCG is now down to 3.  Continue weekly beta hCG levels for post molar surveillance.

## 2018-03-16 ENCOUNTER — TELEPHONE (OUTPATIENT)
Dept: OBSTETRICS AND GYNECOLOGY | Age: 28
End: 2018-03-16

## 2018-03-16 DIAGNOSIS — O02.0 MOLAR PREGNANCY: Primary | ICD-10-CM

## 2018-03-16 LAB — HCG INTACT+B SERPL-ACNC: 1.4 MIU/ML

## 2018-03-16 NOTE — TELEPHONE ENCOUNTER
----- Message from Marcelo Valdez MD sent at 3/16/2018  8:38 AM EDT -----  notify level is down to 1.4.  Repeat in 1 week.

## 2018-03-23 ENCOUNTER — TELEPHONE (OUTPATIENT)
Dept: OBSTETRICS AND GYNECOLOGY | Age: 28
End: 2018-03-23

## 2018-03-23 DIAGNOSIS — O02.0 MOLAR PREGNANCY: Primary | ICD-10-CM

## 2018-03-23 LAB — HCG INTACT+B SERPL-ACNC: 0.96 MIU/ML

## 2018-03-23 NOTE — TELEPHONE ENCOUNTER
----- Message from Marcelo Valdez MD sent at 3/23/2018  9:31 AM EDT -----  Please notify level is reading at 0.9.  Please have patient repeat in 1 week.

## 2018-03-30 ENCOUNTER — TELEPHONE (OUTPATIENT)
Dept: OBSTETRICS AND GYNECOLOGY | Age: 28
End: 2018-03-30

## 2018-03-30 DIAGNOSIS — O02.0 MOLAR PREGNANCY: Primary | ICD-10-CM

## 2018-03-30 LAB — HCG INTACT+B SERPL-ACNC: 0.71 MIU/ML

## 2018-03-30 NOTE — TELEPHONE ENCOUNTER
----- Message from Marcelo Valdez MD sent at 3/30/2018  8:12 AM EDT -----  Please notify beta is reading at 0.7.  Recommend repeat in 1 week.

## 2018-04-05 LAB — HCG INTACT+B SERPL-ACNC: 0.51 MIU/ML

## 2018-04-09 ENCOUNTER — TELEPHONE (OUTPATIENT)
Dept: OBSTETRICS AND GYNECOLOGY | Age: 28
End: 2018-04-09

## 2018-04-09 DIAGNOSIS — O02.0 MOLAR PREGNANCY: Primary | ICD-10-CM

## 2018-05-09 ENCOUNTER — TELEPHONE (OUTPATIENT)
Dept: OBSTETRICS AND GYNECOLOGY | Age: 28
End: 2018-05-09

## 2018-05-09 NOTE — TELEPHONE ENCOUNTER
----- Message from Marcelo Valdez MD sent at 5/9/2018  8:10 AM EDT -----  Please notify the level is negative.  Patient will need repeat monthly levels through September.

## 2018-06-04 DIAGNOSIS — O02.0 MOLAR PREGNANCY: Primary | ICD-10-CM

## 2018-06-05 LAB — HCG INTACT+B SERPL-ACNC: <0.5 MIU/ML

## 2018-06-21 ENCOUNTER — TELEPHONE (OUTPATIENT)
Dept: OBSTETRICS AND GYNECOLOGY | Age: 28
End: 2018-06-21

## 2018-06-21 NOTE — TELEPHONE ENCOUNTER
Pts insurance has dropped and [facility].  She just wants the MA to be aware so arrangements can be made for her monthly BHCG.  Due the first week of July.

## 2018-07-23 ENCOUNTER — TELEPHONE (OUTPATIENT)
Dept: OBSTETRICS AND GYNECOLOGY | Age: 28
End: 2018-07-23

## 2018-07-23 NOTE — TELEPHONE ENCOUNTER
----- Message from Rosa Arnold MA sent at 7/23/2018  1:31 PM EDT -----  Regarding: FW: Non-Urgent Medical Question  Contact: [phone]      ----- Message -----  From: Marisa Case  Sent: 7/23/2018   1:15 PM  [facility]  Subject: Non-Urgent Medical Question                      Good Afternoon,    Since I'm using [facility] for my monthly HCG blood test now, do I need to come in to your office and  new, signed paperwork every month to take with me to [facility]? I wanted to double check since I will be going over there again in a couple weeks.    Thanks,  Marisa

## 2018-08-06 ENCOUNTER — TELEPHONE (OUTPATIENT)
Dept: OBSTETRICS AND GYNECOLOGY | Age: 28
End: 2018-08-06

## 2018-08-27 ENCOUNTER — OFFICE VISIT (OUTPATIENT)
Dept: OBSTETRICS AND GYNECOLOGY | Age: 28
End: 2018-08-27

## 2018-08-27 VITALS
BODY MASS INDEX: 36.98 KG/M2 | SYSTOLIC BLOOD PRESSURE: 132 MMHG | HEIGHT: 68 IN | WEIGHT: 244 LBS | DIASTOLIC BLOOD PRESSURE: 80 MMHG

## 2018-08-27 DIAGNOSIS — Z31.69 ENCOUNTER FOR PRECONCEPTION CONSULTATION: ICD-10-CM

## 2018-08-27 DIAGNOSIS — O08.89 PARTIAL MOLAR PREGNANCY: Primary | ICD-10-CM

## 2018-08-27 PROCEDURE — 99212 OFFICE O/P EST SF 10 MIN: CPT | Performed by: OBSTETRICS & GYNECOLOGY

## 2018-08-27 NOTE — PROGRESS NOTES
"  Chief complaint-patient is here for follow-up of partial molar pregnancy.      History of present illness- Patient is a 27 y.o.  who is here for follow-up partial molar pregnancy.  She has followed her betas down to 0 and they have remained low for 6 months.  She would like to try for pregnancy.  She has a history of chickenpox.  Taking prenatal vitamins.  AE is due next month.    Review of Systems   Constitutional: Negative for fatigue.   Respiratory: Negative for shortness of breath.    Gastrointestinal: Negative for abdominal pain.   Genitourinary: Negative for dysuria.   Neurological: Negative for headaches.   Psychiatric/Behavioral: Negative for dysphoric mood.     /80   Ht 172.7 cm (68\")   Wt 111 kg (244 lb)   LMP 2018   BMI 37.10 kg/m²   Physical Exam   Constitutional: She appears well-developed and well-nourished. No distress.   Psychiatric: She has a normal mood and affect. Her behavior is normal. Judgment and thought content normal.           Marisa was seen today for follow-up.    Diagnoses and all orders for this visit:    Partial molar pregnancy    Encounter for preconception consultation     she will have her last beta hCG drawn today and we will call her with results.  If still negative she will try for pregnancy.  We discussed timing to increase chance of pregnancy.  She will call early due to history of molar pregnancy to check beta's.  "

## 2018-08-28 ENCOUNTER — TELEPHONE (OUTPATIENT)
Dept: OBSTETRICS AND GYNECOLOGY | Age: 28
End: 2018-08-28

## 2018-10-02 ENCOUNTER — OFFICE VISIT (OUTPATIENT)
Dept: OBSTETRICS AND GYNECOLOGY | Age: 28
End: 2018-10-02

## 2018-10-02 VITALS
HEIGHT: 68 IN | WEIGHT: 247.2 LBS | BODY MASS INDEX: 37.47 KG/M2 | SYSTOLIC BLOOD PRESSURE: 136 MMHG | DIASTOLIC BLOOD PRESSURE: 82 MMHG

## 2018-10-02 DIAGNOSIS — Z11.51 SPECIAL SCREENING EXAMINATION FOR HUMAN PAPILLOMAVIRUS (HPV): ICD-10-CM

## 2018-10-02 DIAGNOSIS — Z01.419 ENCOUNTER FOR GYNECOLOGICAL EXAMINATION WITHOUT ABNORMAL FINDING: Primary | ICD-10-CM

## 2018-10-02 DIAGNOSIS — Z12.4 ROUTINE CERVICAL SMEAR: ICD-10-CM

## 2018-10-02 PROBLEM — O08.89 PARTIAL MOLAR PREGNANCY: Status: RESOLVED | Noted: 2018-02-13 | Resolved: 2018-10-02

## 2018-10-02 PROBLEM — Z67.91 RH NEGATIVE, MATERNAL: Status: RESOLVED | Noted: 2018-02-06 | Resolved: 2018-10-02

## 2018-10-02 PROBLEM — O26.899 RH NEGATIVE, MATERNAL: Status: RESOLVED | Noted: 2018-02-06 | Resolved: 2018-10-02

## 2018-10-02 PROCEDURE — 99395 PREV VISIT EST AGE 18-39: CPT | Performed by: OBSTETRICS & GYNECOLOGY

## 2018-10-02 NOTE — PROGRESS NOTES
"Subjective     Chief Complaint   Patient presents with   • Gynecologic Exam     annual exam       History of Present Illness    Marisa Case is a 27 y.o.  who presents for annual exam.  Patient has a significant history of a partial molar pregnancy.  She had a D&C and we follow betas down to 0.  There were followed monthly.  She is now trying for pregnancy.  She has recently been tested and is rubella immune.   She has a history of chickenpox.  She has gained weight over the last year.  She is exercising regularly.  Her menses are regular every 28-30 days, lasting 4-7 days, dysmenorrhea moderate, occurring first 1-2 days of flow   Obstetric History:  OB History      Para Term  AB Living    1 0 0 0 1 0    SAB TAB Ectopic Molar Multiple Live Births    0 0 0 1 0           Menstrual History:     Patient's last menstrual period was 2018 (exact date).         Current contraception: none  History of abnormal Pap smear: no  Received Gardasil immunization: yes  Perform regular self breast exam: no  Family history of uterine or ovarian cancer: no  Family History of colon cancer: no  Family history of breast cancer: no    Mammogram: not indicated.  Colonoscopy: not indicated.  DEXA: not indicated.    Exercise: exercises 7 times a week  Calcium/Vitamin D: adequate intake    The following portions of the patient's history were reviewed and updated as appropriate: allergies, current medications, past family history, past medical history, past social history, past surgical history and problem list.    Review of Systems    Review of Systems   Constitutional: Negative for fatigue.   Respiratory: Negative for shortness of breath.    Gastrointestinal: Negative for abdominal pain.   Genitourinary: Negative for dysuria.   Neurological: Negative for headaches.   Psychiatric/Behavioral: Negative for dysphoric mood.         Objective   Physical Exam    /82   Ht 172.7 cm (68\")   Wt 112 kg (247 lb 3.2 " oz)   LMP 09/20/2018 (Exact Date)   BMI 37.59 kg/m²     General:   alert, appears stated age and cooperative   Neck: thyroid normal to palpation   Heart: regular rate and rhythm   Lungs: clear to auscultation bilaterally   Abdomen: soft, non-tender, without masses or organomegaly   Breast: inspection negative, no nipple discharge or bleeding, no masses or nodularity palpable   Vulva: normal, Bartholin's, Urethra, [place]'s normal   Vagina: normal mucosa, normal discharge   Cervix: no cervical motion tenderness and no lesions   Uterus: mobile, non-tender, normal shape and consistency   Adnexa: no mass, fullness, tenderness   Rectal: not indicated     Assessment/Plan   Marisa was seen today for gynecologic exam.    Diagnoses and all orders for this visit:    Encounter for gynecological examination without abnormal finding  -     IGP, Apt HPV,rfx 16 / 18,45    BMI 37.0-37.9, adult  -     TSH  -     Hemoglobin A1c    Special screening examination for human papillomavirus (HPV)  -     IGP, Apt HPV,rfx 16 / 18,45    Routine cervical smear  -     IGP, Apt HPV,rfx 16 / 18,45      Patient is having regular cycles.  We will check some labs today due to her body weight.  She will continue prenatal vitamins.  She will call with any positive pregnancy test due to history of molar pregnancy.  We will do an early ultrasound and early beta-hCGs.    All questions answered.  Breast self exam technique reviewed and patient encouraged to perform self-exam monthly.  Discussed healthy lifestyle modifications.  Recommended 30 minutes of aerobic exercise five times per week.  Discussed calcium needs to prevent osteoporosis.

## 2018-10-03 LAB
HBA1C MFR BLD: 5.3 % (ref 4.8–5.6)
TSH SERPL DL<=0.005 MIU/L-ACNC: 2.66 MIU/ML (ref 0.27–4.2)

## 2018-10-04 LAB
CYTOLOGIST CVX/VAG CYTO: NORMAL
CYTOLOGY CVX/VAG DOC THIN PREP: NORMAL
DX ICD CODE: NORMAL
HIV 1 & 2 AB SER-IMP: NORMAL
HPV I/H RISK 4 DNA CVX QL PROBE+SIG AMP: NEGATIVE
OTHER STN SPEC: NORMAL
PATH REPORT.FINAL DX SPEC: NORMAL
STAT OF ADQ CVX/VAG CYTO-IMP: NORMAL

## 2018-11-19 ENCOUNTER — TELEPHONE (OUTPATIENT)
Dept: OBSTETRICS AND GYNECOLOGY | Age: 28
End: 2018-11-19

## 2018-11-19 DIAGNOSIS — Z87.59 HISTORY OF MOLAR PREGNANCY: Primary | ICD-10-CM

## 2018-11-19 DIAGNOSIS — Z32.01 POSITIVE URINE PREGNANCY TEST: ICD-10-CM

## 2018-11-19 NOTE — TELEPHONE ENCOUNTER
Patient has a history of molar pregnancy.  Please have the patient come in for beta hCG today and Wednesday.

## 2018-11-20 ENCOUNTER — TELEPHONE (OUTPATIENT)
Dept: OBSTETRICS AND GYNECOLOGY | Age: 28
End: 2018-11-20

## 2018-11-20 ENCOUNTER — LAB (OUTPATIENT)
Dept: OBSTETRICS AND GYNECOLOGY | Age: 28
End: 2018-11-20

## 2018-11-20 LAB — HCG INTACT+B SERPL-ACNC: 14.99 MIU/ML

## 2018-11-20 PROCEDURE — 96372 THER/PROPH/DIAG INJ SC/IM: CPT | Performed by: OBSTETRICS & GYNECOLOGY

## 2018-11-20 NOTE — TELEPHONE ENCOUNTER
----- Message from Marcelo Valdez MD sent at 11/20/2018  9:19 AM EST -----  These notify level is an early positive at 16.  We will repeat on Wednesday.

## 2018-11-20 NOTE — TELEPHONE ENCOUNTER
Pt notd. She is c/o bright red bleeding just now, and cramping. Pt will come in tomorrow for repeat labs.

## 2018-11-26 ENCOUNTER — TELEPHONE (OUTPATIENT)
Dept: OBSTETRICS AND GYNECOLOGY | Age: 28
End: 2018-11-26

## 2018-11-26 DIAGNOSIS — O02.0 BLIGHTED OVUM: Primary | ICD-10-CM

## 2018-11-26 NOTE — TELEPHONE ENCOUNTER
----- Message from Marcelo Valdez MD sent at 11/21/2018  2:22 PM EST -----  I talked to the patient.  She is having bleeding and her beta decreased.  We discussed this is likely a very early blighted ovum.  She is not having any pain.  Please have the patient come in next week for another beta.

## 2018-11-29 ENCOUNTER — TELEPHONE (OUTPATIENT)
Dept: OBSTETRICS AND GYNECOLOGY | Age: 28
End: 2018-11-29

## 2018-11-29 LAB — HCG INTACT+B SERPL-ACNC: <0.5 MIU/ML

## 2018-11-29 NOTE — TELEPHONE ENCOUNTER
----- Message from Marcelo Valdez MD sent at 11/29/2018  8:59 AM EST -----  Please notify hCG level is now negative.

## 2019-04-25 ENCOUNTER — TELEPHONE (OUTPATIENT)
Dept: OBSTETRICS AND GYNECOLOGY | Age: 29
End: 2019-04-25

## 2019-04-25 DIAGNOSIS — Z87.59 HISTORY OF MOLAR PREGNANCY: Primary | ICD-10-CM

## 2019-04-25 DIAGNOSIS — N92.6 IRREGULAR MENSES: ICD-10-CM

## 2019-04-25 DIAGNOSIS — Z32.01 POSITIVE PREGNANCY TEST: ICD-10-CM

## 2019-04-25 NOTE — TELEPHONE ENCOUNTER
Pt was advised to call when she received a positive pregnancy test so she could be given special instructions. Please advise.

## 2019-04-25 NOTE — TELEPHONE ENCOUNTER
PT'S LMP WAS 3-27-19. PT NOTD TO [facility]/I TODAY FOR BHCG. PLEASE SCHEDULE APPT NEXT Thursday AT 10:30 FOR U/S AND DR PAPPAS

## 2019-04-26 ENCOUNTER — TELEPHONE (OUTPATIENT)
Dept: OBSTETRICS AND GYNECOLOGY | Age: 29
End: 2019-04-26

## 2019-04-26 LAB — HCG INTACT+B SERPL-ACNC: 74.78 MIU/ML

## 2019-04-26 NOTE — TELEPHONE ENCOUNTER
----- Message from Marcelo Valdez MD sent at 4/26/2019  9:05 AM EDT -----  Please notify hemoglobin A1c is in the normal range for 4 weeks pregnant at 74.  Keep appointment for ultrasound next week.

## 2019-05-02 ENCOUNTER — OFFICE VISIT (OUTPATIENT)
Dept: OBSTETRICS AND GYNECOLOGY | Age: 29
End: 2019-05-02

## 2019-05-02 ENCOUNTER — PROCEDURE VISIT (OUTPATIENT)
Dept: OBSTETRICS AND GYNECOLOGY | Age: 29
End: 2019-05-02

## 2019-05-02 VITALS
WEIGHT: 250 LBS | HEIGHT: 68 IN | SYSTOLIC BLOOD PRESSURE: 108 MMHG | DIASTOLIC BLOOD PRESSURE: 74 MMHG | BODY MASS INDEX: 37.89 KG/M2

## 2019-05-02 DIAGNOSIS — O36.80X0 ENCOUNTER TO DETERMINE FETAL VIABILITY OF PREGNANCY, SINGLE OR UNSPECIFIED FETUS: Primary | ICD-10-CM

## 2019-05-02 DIAGNOSIS — O20.0 THREATENED ABORTION: Primary | ICD-10-CM

## 2019-05-02 PROCEDURE — 99212 OFFICE O/P EST SF 10 MIN: CPT | Performed by: OBSTETRICS & GYNECOLOGY

## 2019-05-02 PROCEDURE — 76817 TRANSVAGINAL US OBSTETRIC: CPT | Performed by: OBSTETRICS & GYNECOLOGY

## 2019-05-02 RX ORDER — CETIRIZINE HYDROCHLORIDE 10 MG/1
10 TABLET ORAL DAILY
COMMUNITY
End: 2020-02-11

## 2019-05-02 NOTE — PROGRESS NOTES
"Subjective      Marisa Case is a 28 y.o. female who has a history of molar pregnancy.  I have asked her to come in early in pregnancy due to this history.  She has not experienced any bleeding.  She did have one beta drawn which was in appropriate range.  She should be 5 weeks 1 day by last menstrual period.  Cycle length: regular q 28 .  Pregnancy testing: quant HCG level  on 745.  Pregnancy imaging: not done.  Blood type: A negative.  Other lab results: none.    The following portions of the patient's history were reviewed and updated as appropriate: allergies and current medications.    Review of Systems  Pertinent items are noted in HPI.     Objective      /74   Ht 172.7 cm (68\")   Wt 113 kg (250 lb)   LMP 2019   BMI 38.01 kg/m²     General: alert, appears stated age and cooperative   Heart:    Lungs:    Abdomen:    Vulva:    Vagina:    Cervix:    Uterus:    Adnexa:      Imaging  Limited office ultrasound: Her sound shows a small gestational sac only.  No yolk sac or fetal pole seen ovaries appear normal.      Assessment/Plan     Marisa was seen today for follow-up.    Diagnoses and all orders for this visit:    Threatened   -     HCG, B-subunit, Quantitative      Patient has a history of molar pregnancy and will need to be followed closely.  She will repeat her beta today.  We will repeat ultrasound in 1 week.  We did discuss that no fetal pole is seen yet so we cannot accurately say if the pregnancy is intrauterine or date the pregnancy yet.  She will call with bleeding or pain.  Patient is Rh-.  "

## 2019-05-03 ENCOUNTER — TELEPHONE (OUTPATIENT)
Dept: OBSTETRICS AND GYNECOLOGY | Age: 29
End: 2019-05-03

## 2019-05-03 LAB — HCG INTACT+B SERPL-ACNC: NORMAL MIU/ML

## 2019-05-03 NOTE — TELEPHONE ENCOUNTER
----- Message from Marcelo Valdez MD sent at 5/3/2019  9:34 AM EDT -----  Please notify patient that hCG level looks good.  Level is at 1089 which is in range for 5 weeks of pregnancy

## 2019-05-09 ENCOUNTER — PROCEDURE VISIT (OUTPATIENT)
Dept: OBSTETRICS AND GYNECOLOGY | Age: 29
End: 2019-05-09

## 2019-05-09 ENCOUNTER — OFFICE VISIT (OUTPATIENT)
Dept: OBSTETRICS AND GYNECOLOGY | Age: 29
End: 2019-05-09

## 2019-05-09 VITALS
DIASTOLIC BLOOD PRESSURE: 76 MMHG | HEIGHT: 68 IN | BODY MASS INDEX: 38.19 KG/M2 | WEIGHT: 252 LBS | SYSTOLIC BLOOD PRESSURE: 120 MMHG

## 2019-05-09 DIAGNOSIS — O36.80X0 ENCOUNTER TO DETERMINE FETAL VIABILITY OF PREGNANCY, SINGLE OR UNSPECIFIED FETUS: Primary | ICD-10-CM

## 2019-05-09 DIAGNOSIS — O20.0 THREATENED ABORTION: Primary | ICD-10-CM

## 2019-05-09 PROCEDURE — 76817 TRANSVAGINAL US OBSTETRIC: CPT | Performed by: OBSTETRICS & GYNECOLOGY

## 2019-05-09 PROCEDURE — 99212 OFFICE O/P EST SF 10 MIN: CPT | Performed by: OBSTETRICS & GYNECOLOGY

## 2019-05-09 NOTE — PROGRESS NOTES
"Subjective      Marisa Case is a 28 y.o. female.  Patient has a history of a molar pregnancy.  She had an ultrasound last week that showed a 33 mm gestational sac with what appeared to be clear fluid.  There is no evidence of molar tissue but no yolk sac or fetal pole.  Patient reports fatigue but no nausea.  With her molar pregnancy she did have significant nausea and vomiting.  She has not had any vaginal bleeding    Beta-hCG level checked last week was 1082 in the 5-week range.    The following portions of the patient's history were reviewed and updated as appropriate: allergies and current medications.    Review of Systems  Pertinent items are noted in HPI.     Objective      /76   Ht 172.7 cm (68\")   Wt 114 kg (252 lb)   LMP 2019   BMI 38.32 kg/m²     General: alert, appears stated age and cooperative   Heart:    Lungs:    Abdomen:    Vulva:    Vagina:    Cervix:    Uterus:    Adnexa:      Imaging  Limited office ultrasound: Ultrasound shows a 0.92 cm gestational sac.  A yolk sac is seen but no fetal pole yet.  No adnexal masses are seen.      Assessment/Plan     Marisa was seen today for threatened miscarriage.    Diagnoses and all orders for this visit:    Threatened   -     Cancel: HCG, B-subunit, Quantitative; Future  -     HCG, B-subunit, Quantitative      Recheck beta-hCG today.    Patient will come back for viability ultrasound in 1 week.  Discussed with the patient I cannot fully exclude the chance of molar pregnancy however beta in the normal range is reassuring.  We also discussed there is still a risk of nonviable pregnancy.  We will continue to follow on ultrasound.  When fetal pole is able to be measured we can give the patient a due date.    10 minutes were spent in face-to-face consultation with the patient.  "

## 2019-05-10 ENCOUNTER — TELEPHONE (OUTPATIENT)
Dept: OBSTETRICS AND GYNECOLOGY | Age: 29
End: 2019-05-10

## 2019-05-10 LAB — HCG INTACT+B SERPL-ACNC: NORMAL MIU/ML

## 2019-05-10 NOTE — TELEPHONE ENCOUNTER
----- Message from Marcelo Valdez MD sent at 5/10/2019  8:36 AM EDT -----  Please notify beta hCG level has increased appropriately and is now at 8643.

## 2019-05-16 ENCOUNTER — PROCEDURE VISIT (OUTPATIENT)
Dept: OBSTETRICS AND GYNECOLOGY | Age: 29
End: 2019-05-16

## 2019-05-16 ENCOUNTER — OFFICE VISIT (OUTPATIENT)
Dept: OBSTETRICS AND GYNECOLOGY | Age: 29
End: 2019-05-16

## 2019-05-16 VITALS
DIASTOLIC BLOOD PRESSURE: 72 MMHG | SYSTOLIC BLOOD PRESSURE: 118 MMHG | HEIGHT: 68 IN | BODY MASS INDEX: 38.49 KG/M2 | WEIGHT: 254 LBS

## 2019-05-16 DIAGNOSIS — Z34.90 PREGNANCY, UNSPECIFIED GESTATIONAL AGE: ICD-10-CM

## 2019-05-16 DIAGNOSIS — O36.80X0 ENCOUNTER TO DETERMINE FETAL VIABILITY OF PREGNANCY, SINGLE OR UNSPECIFIED FETUS: Primary | ICD-10-CM

## 2019-05-16 DIAGNOSIS — O20.0 THREATENED ABORTION: Primary | ICD-10-CM

## 2019-05-16 PROBLEM — Z67.91 RH NEGATIVE STATUS DURING PREGNANCY: Status: ACTIVE | Noted: 2019-05-16

## 2019-05-16 PROBLEM — O26.899 RH NEGATIVE STATUS DURING PREGNANCY: Status: ACTIVE | Noted: 2019-05-16

## 2019-05-16 PROCEDURE — 76817 TRANSVAGINAL US OBSTETRIC: CPT | Performed by: OBSTETRICS & GYNECOLOGY

## 2019-05-16 PROCEDURE — 0501F PRENATAL FLOW SHEET: CPT | Performed by: OBSTETRICS & GYNECOLOGY

## 2019-05-16 NOTE — PROGRESS NOTES
"Subjective      Marisa Case is a 28 y.o. female.  History of a molar pregnancy.  We did check beta-hCG since her last visit and they have been increasing normally.  Patient reports no vaginal bleeding or pain.  She is taking vitamins with DHA.  She does report some fatigue and some nausea but does not want to take any medications.    She has allergies and does take antihistamines.        The following portions of the patient's history were reviewed and updated as appropriate: allergies and current medications.    Review of Systems  Pertinent items are noted in HPI.     Objective      /72   Ht 172.7 cm (68\")   Wt 115 kg (254 lb)   LMP 2019   BMI 38.62 kg/m²     General: alert, appears stated age and cooperative   Heart:    Lungs:    Abdomen:    Vulva:    Vagina:    Cervix:    Uterus:    Adnexa:      Imaging  Limited office ultrasound: Intrauterine pregnancy is seen today with a crown-rump length of 6 weeks 4 days.  There is positive fetal cardiac activity at 131 bpm.  Yolk sac and fetal pole are seen.  Ovaries appear normal.      Assessment/Plan     Marisa was seen today for follow-up.    Diagnoses and all orders for this visit:    Threatened       She has a history of of molar pregnancy.  Patient has ultrasound consistent with intrauterine pregnancy today which is very reassuring.  We did discuss risk of miscarriage in the first trimester.  Patient will return in 2 to 3 weeks for new OB visit.  Patient is Rh- and was told to report any bleeding.  We discussed treatments of nausea and vomiting of pregnancy.  "

## 2019-05-20 ENCOUNTER — TELEPHONE (OUTPATIENT)
Dept: OBSTETRICS AND GYNECOLOGY | Age: 29
End: 2019-05-20

## 2019-05-20 NOTE — TELEPHONE ENCOUNTER
Pt states she wrote through my chart requesting a call form bethany and I believe she has missed the call. Pt would like you to call her back.

## 2019-05-20 NOTE — TELEPHONE ENCOUNTER
Regarding: RE: Non-Urgent Medical Question  Contact: [phone]      ----- Message -----  From: Fabiola Perry MA  Sent: 5/20/2019   9:09 AM  To: Marcelo Valdez MD  Subject: RE: Non-Urgent Medical Question                  ----- Message from Fabiola Perry MA sent at 5/20/2019  9:09 AM EDT -----       ----- Message sent from Jeannette Alcantar MA to Marisa Case at 5/20/2019  8:21 AM -----   Per guidelines dictated by your physician, addressing medical questions via email is not appropriate.      Due to system issues, you may not receive a timely response to your message.      If you have a possible medical emergency please call 911 or proceed to the ER for evaluation.      If you have a routine medical question, call the office during business hours and request that your doctor be sent a message or make an appointment to be seen.      Medical advice is best given in the setting of the office with your physician/provider present so that appropriate diagnostic testing can be performed.     ----- Message -----     From: Marisa Case     Sent: 5/18/2019  6:54 PM EDT       To: Marcelo Valdez MD  Subject: Non-Urgent Medical Question    Dr. Valdez, I had some spotting this morning after straining during a bowel movement. It was a bright red color, but it was gone after a few wipes and there was only enough to be found on toilet paper. I haven't had any cramping to go with it and I didn't have any spotting the rest of the day. I even had another bowel movement later in the day where I didn't strain and there was no spotting. I just wanted to check with you on if this is something that I should be concerned about or not?     Thanks,   Marisa

## 2019-05-20 NOTE — TELEPHONE ENCOUNTER
Pt was notd to call in the future. She feels good now and will call back if she wants an appointment.

## 2019-06-04 VITALS
SYSTOLIC BLOOD PRESSURE: 136 MMHG | HEART RATE: 104 BPM | SYSTOLIC BLOOD PRESSURE: 113 MMHG | BODY MASS INDEX: 35 KG/M2 | WEIGHT: 237 LBS | DIASTOLIC BLOOD PRESSURE: 85 MMHG | DIASTOLIC BLOOD PRESSURE: 80 MMHG | OXYGEN SATURATION: 96 % | WEIGHT: 239 LBS | HEART RATE: 82 BPM | BODY MASS INDEX: 35.29 KG/M2

## 2019-06-06 ENCOUNTER — PROCEDURE VISIT (OUTPATIENT)
Dept: OBSTETRICS AND GYNECOLOGY | Age: 29
End: 2019-06-06

## 2019-06-06 ENCOUNTER — INITIAL PRENATAL (OUTPATIENT)
Dept: OBSTETRICS AND GYNECOLOGY | Age: 29
End: 2019-06-06

## 2019-06-06 VITALS — SYSTOLIC BLOOD PRESSURE: 140 MMHG | WEIGHT: 250 LBS | BODY MASS INDEX: 38.01 KG/M2 | DIASTOLIC BLOOD PRESSURE: 82 MMHG

## 2019-06-06 DIAGNOSIS — Z11.3 SCREEN FOR SEXUALLY TRANSMITTED DISEASES: ICD-10-CM

## 2019-06-06 DIAGNOSIS — Z34.90 PREGNANCY, UNSPECIFIED GESTATIONAL AGE: Primary | ICD-10-CM

## 2019-06-06 DIAGNOSIS — O36.80X0 ENCOUNTER TO DETERMINE FETAL VIABILITY OF PREGNANCY, SINGLE OR UNSPECIFIED FETUS: Primary | ICD-10-CM

## 2019-06-06 LAB — VZV IGG SER QL: NORMAL

## 2019-06-06 PROCEDURE — 0501F PRENATAL FLOW SHEET: CPT | Performed by: OBSTETRICS & GYNECOLOGY

## 2019-06-06 PROCEDURE — 76817 TRANSVAGINAL US OBSTETRIC: CPT | Performed by: OBSTETRICS & GYNECOLOGY

## 2019-06-06 NOTE — PROGRESS NOTES
The patient is a 28-year-old  2 para 0-0-1-0 at 10 weeks gestation.  Patient has history of molar pregnancy and has been coming in for viability ultrasounds.  She is feeling well aside from some mild nausea.  She is taking brody.  She was nervous about the ultrasound today and her blood pressure was slightly elevated.  She does desire first trimester testing and carrier testing.    Full history was reviewed.    Exam-see exam tab    Ultrasound shows intrauterine pregnancy and dates agree.    Assessment-10 weeks  Recommend 81 mg aspirin starting at 12 weeks due to BMI over 30.  Discussed this is for prevention of preeclampsia.  We discussed options for genetic testing including no testing, amniocentesis, full carrier panel or first trimester testing with limited carrier panel.  Patient desires first trimester testing with limited carrier panel.  New OB information was reviewed in detail and new OB folder given.

## 2019-06-07 ENCOUNTER — TELEPHONE (OUTPATIENT)
Dept: OBSTETRICS AND GYNECOLOGY | Age: 29
End: 2019-06-07

## 2019-06-07 LAB
ABO GROUP BLD: (no result)
BASOPHILS # BLD AUTO: 0 X10E3/UL (ref 0–0.2)
BASOPHILS NFR BLD AUTO: 0 %
BLD GP AB SCN SERPL QL: NEGATIVE
C TRACH RRNA SPEC QL NAA+PROBE: NEGATIVE
EOSINOPHIL # BLD AUTO: 0.1 X10E3/UL (ref 0–0.4)
EOSINOPHIL NFR BLD AUTO: 1 %
ERYTHROCYTE [DISTWIDTH] IN BLOOD BY AUTOMATED COUNT: 13.1 % (ref 12.3–15.4)
HBV SURFACE AG SERPL QL IA: NEGATIVE
HCT VFR BLD AUTO: 39.2 % (ref 34–46.6)
HCV AB S/CO SERPL IA: <0.1 S/CO RATIO (ref 0–0.9)
HGB BLD-MCNC: 13.1 G/DL (ref 11.1–15.9)
HIV 1+2 AB+HIV1 P24 AG SERPL QL IA: NON REACTIVE
IMM GRANULOCYTES # BLD AUTO: 0 X10E3/UL (ref 0–0.1)
IMM GRANULOCYTES NFR BLD AUTO: 0 %
LYMPHOCYTES # BLD AUTO: 1.6 X10E3/UL (ref 0.7–3.1)
LYMPHOCYTES NFR BLD AUTO: 16 %
MCH RBC QN AUTO: 30.5 PG (ref 26.6–33)
MCHC RBC AUTO-ENTMCNC: 33.4 G/DL (ref 31.5–35.7)
MCV RBC AUTO: 91 FL (ref 79–97)
MONOCYTES # BLD AUTO: 0.5 X10E3/UL (ref 0.1–0.9)
MONOCYTES NFR BLD AUTO: 5 %
N GONORRHOEA RRNA SPEC QL NAA+PROBE: NEGATIVE
NEUTROPHILS # BLD AUTO: 8 X10E3/UL (ref 1.4–7)
NEUTROPHILS NFR BLD AUTO: 78 %
PLATELET # BLD AUTO: 347 X10E3/UL (ref 150–450)
RBC # BLD AUTO: 4.3 X10E6/UL (ref 3.77–5.28)
RH BLD: NEGATIVE
RPR SER QL: NON REACTIVE
RUBV IGG SERPL IA-ACNC: 6.67 INDEX
WBC # BLD AUTO: 10.3 X10E3/UL (ref 3.4–10.8)

## 2019-06-07 NOTE — TELEPHONE ENCOUNTER
----- Message from Marcelo Valdez MD sent at 6/7/2019 12:34 PM EDT -----  Please notify blood work is normal.

## 2019-06-17 ENCOUNTER — TELEPHONE (OUTPATIENT)
Dept: OBSTETRICS AND GYNECOLOGY | Age: 29
End: 2019-06-17

## 2019-06-17 NOTE — TELEPHONE ENCOUNTER
----- Message from Marcelo Valdez MD sent at 6/17/2019  7:59 AM EDT -----  Notify first trimester testing is normal.  Notify of gender if the patient would like to know.

## 2019-07-12 ENCOUNTER — ROUTINE PRENATAL (OUTPATIENT)
Dept: OBSTETRICS AND GYNECOLOGY | Age: 29
End: 2019-07-12

## 2019-07-12 VITALS — BODY MASS INDEX: 37.4 KG/M2 | DIASTOLIC BLOOD PRESSURE: 78 MMHG | SYSTOLIC BLOOD PRESSURE: 108 MMHG | WEIGHT: 246 LBS

## 2019-07-12 DIAGNOSIS — Z34.90 PREGNANCY, UNSPECIFIED GESTATIONAL AGE: Primary | ICD-10-CM

## 2019-07-12 PROCEDURE — 0502F SUBSEQUENT PRENATAL CARE: CPT | Performed by: OBSTETRICS & GYNECOLOGY

## 2019-07-12 NOTE — PROGRESS NOTES
The patient is feeling well.  She has reduced her intake of sugar and is eating healthy.  She is exercising.  She did first trimester testing which was normal.  Baby was a boy.    Labs are reviewed and are normal.  Patient is Rh-.  4 pound weight gain since last visit but patient is not having nausea.  Blood pressure is normal at 108/78 with no protein.  At previous visit blood pressure was borderline at 140/82 but patient was very nervous due to history of molar pregnancy.    Assessment-  BMI over 30-patient has started low-dose aspirin for prevention of preeclampsia.  AFP test today.  Follow-up for anatomy ultrasound in 5 weeks.

## 2019-07-16 ENCOUNTER — TELEPHONE (OUTPATIENT)
Dept: OBSTETRICS AND GYNECOLOGY | Age: 29
End: 2019-07-16

## 2019-07-16 LAB
AFP ADJ MOM SERPL: 1.04
AFP INTERP SERPL-IMP: NORMAL
AFP INTERP SERPL-IMP: NORMAL
AFP SERPL-MCNC: 22.9 NG/ML
AGE AT DELIVERY: 31 YR
GA METHOD: NORMAL
GA: 15.3 WEEKS
IDDM PATIENT QL: NO
LABORATORY COMMENT REPORT: NORMAL
MULTIPLE PREGNANCY: NO
NEURAL TUBE DEFECT RISK FETUS: NORMAL %
RESULT: NORMAL

## 2019-07-16 NOTE — TELEPHONE ENCOUNTER
----- Message from Marcelo Valdez MD sent at 7/16/2019  3:20 PM EDT -----  Please notify AFP level is normal.

## 2019-08-15 ENCOUNTER — PROCEDURE VISIT (OUTPATIENT)
Dept: OBSTETRICS AND GYNECOLOGY | Age: 29
End: 2019-08-15

## 2019-08-15 ENCOUNTER — ROUTINE PRENATAL (OUTPATIENT)
Dept: OBSTETRICS AND GYNECOLOGY | Age: 29
End: 2019-08-15

## 2019-08-15 VITALS — DIASTOLIC BLOOD PRESSURE: 80 MMHG | BODY MASS INDEX: 37.25 KG/M2 | SYSTOLIC BLOOD PRESSURE: 128 MMHG | WEIGHT: 245 LBS

## 2019-08-15 DIAGNOSIS — Z34.90 PREGNANCY, UNSPECIFIED GESTATIONAL AGE: ICD-10-CM

## 2019-08-15 DIAGNOSIS — Z03.75 SUSPECTED CERVICAL SHORTENING NOT FOUND: ICD-10-CM

## 2019-08-15 DIAGNOSIS — Z34.92 SECOND TRIMESTER FETUS: Primary | ICD-10-CM

## 2019-08-15 PROCEDURE — 76805 OB US >/= 14 WKS SNGL FETUS: CPT | Performed by: OBSTETRICS & GYNECOLOGY

## 2019-08-15 PROCEDURE — 0502F SUBSEQUENT PRENATAL CARE: CPT | Performed by: OBSTETRICS & GYNECOLOGY

## 2019-08-15 PROCEDURE — 76817 TRANSVAGINAL US OBSTETRIC: CPT | Performed by: OBSTETRICS & GYNECOLOGY

## 2019-08-15 NOTE — PROGRESS NOTES
Patient is here today for anatomy ultrasound.  She has a work trip at 34 weeks and wants to know if she can travel.  She has felt fetal movements on and off.  She is eating a healthy diet.    Anatomy ultrasound shows inadequate views of the fetal heart due to fetal position.  Size less than dates by 5 days  Cervical length is normal at 4.3 cm with no funneling.  Baby is breech.  Placenta is anterior with no previa  Anatomy is normal except for heart views not seen.    AFP was normal.    Assessment- 20 weeks  Repeat ultrasound in 4 weeks to visualize the fetal heart.  BMI over 30-continue low-dose aspirin for prevention of preeclampsia  Recommend looking for pediatrician.

## 2019-09-12 ENCOUNTER — PROCEDURE VISIT (OUTPATIENT)
Dept: OBSTETRICS AND GYNECOLOGY | Age: 29
End: 2019-09-12

## 2019-09-12 ENCOUNTER — ROUTINE PRENATAL (OUTPATIENT)
Dept: OBSTETRICS AND GYNECOLOGY | Age: 29
End: 2019-09-12

## 2019-09-12 VITALS — SYSTOLIC BLOOD PRESSURE: 118 MMHG | BODY MASS INDEX: 38.16 KG/M2 | DIASTOLIC BLOOD PRESSURE: 70 MMHG | WEIGHT: 251 LBS

## 2019-09-12 DIAGNOSIS — O26.899 RH NEGATIVE, ANTEPARTUM: Primary | ICD-10-CM

## 2019-09-12 DIAGNOSIS — Z67.91 RH NEGATIVE, ANTEPARTUM: Primary | ICD-10-CM

## 2019-09-12 DIAGNOSIS — Z34.90 PREGNANCY, UNSPECIFIED GESTATIONAL AGE: ICD-10-CM

## 2019-09-12 DIAGNOSIS — IMO0002 EVALUATE ANATOMY NOT SEEN ON PRIOR SONOGRAM: ICD-10-CM

## 2019-09-12 PROCEDURE — 0502F SUBSEQUENT PRENATAL CARE: CPT | Performed by: OBSTETRICS & GYNECOLOGY

## 2019-09-12 PROCEDURE — 76815 OB US LIMITED FETUS(S): CPT | Performed by: OBSTETRICS & GYNECOLOGY

## 2019-09-12 NOTE — PROGRESS NOTES
Patient is feeling well.  She is here today for repeat views of the fetal hearts which were not seen well on prior ultrasound.  She and her  have completed classes and picked at a pediatrician.  She is feeling good fetal movements.    Cardiac views appear normal within the limits of ultrasound.  Baby is vertex.  6 pound weight gain since last visit but overall weight is down 3 pounds for the pregnancy.    Assessment-24 weeks  BMI over 30-continue low-dose aspirin.  Plan for weight ultrasound at 32 weeks and BPP's at 36 weeks.

## 2019-10-10 ENCOUNTER — ROUTINE PRENATAL (OUTPATIENT)
Dept: OBSTETRICS AND GYNECOLOGY | Age: 29
End: 2019-10-10

## 2019-10-10 VITALS — SYSTOLIC BLOOD PRESSURE: 120 MMHG | DIASTOLIC BLOOD PRESSURE: 80 MMHG | BODY MASS INDEX: 38.32 KG/M2 | WEIGHT: 252 LBS

## 2019-10-10 DIAGNOSIS — Z13.1 SCREENING FOR DIABETES MELLITUS: Primary | ICD-10-CM

## 2019-10-10 DIAGNOSIS — O26.899 RH NEGATIVE STATE IN ANTEPARTUM PERIOD: ICD-10-CM

## 2019-10-10 DIAGNOSIS — Z67.91 RH NEGATIVE STATE IN ANTEPARTUM PERIOD: ICD-10-CM

## 2019-10-10 DIAGNOSIS — M79.672 PAIN OF LEFT HEEL: ICD-10-CM

## 2019-10-10 DIAGNOSIS — Z23 FLU VACCINE NEED: ICD-10-CM

## 2019-10-10 PROCEDURE — 90471 IMMUNIZATION ADMIN: CPT | Performed by: OBSTETRICS & GYNECOLOGY

## 2019-10-10 PROCEDURE — 96372 THER/PROPH/DIAG INJ SC/IM: CPT | Performed by: OBSTETRICS & GYNECOLOGY

## 2019-10-10 PROCEDURE — 0502F SUBSEQUENT PRENATAL CARE: CPT | Performed by: OBSTETRICS & GYNECOLOGY

## 2019-10-10 PROCEDURE — 90472 IMMUNIZATION ADMIN EACH ADD: CPT | Performed by: OBSTETRICS & GYNECOLOGY

## 2019-10-10 PROCEDURE — 90715 TDAP VACCINE 7 YRS/> IM: CPT | Performed by: OBSTETRICS & GYNECOLOGY

## 2019-10-10 PROCEDURE — 90674 CCIIV4 VAC NO PRSV 0.5 ML IM: CPT | Performed by: OBSTETRICS & GYNECOLOGY

## 2019-10-10 NOTE — PROGRESS NOTES
The patient is feeling well.  She is looking for a pediatric group practice.  She is feeling excellent fetal movement.  No bleeding or contractions.    Doppler heart tones are positive.  Fundal height is elevated at 30 cm  Weight gain for pregnancy is low  Blood pressure 120/80 with no protein    Assessment-28 weeks  Third trimester labs today  Rh--RhoGam given today  Flu vaccination and open cough vaccination given also.  Elevated BMI-continue low-dose aspirin-check fetal weight at 32 weeks and plan for BPP's to start at 36 weeks.

## 2019-10-11 ENCOUNTER — TELEPHONE (OUTPATIENT)
Dept: OBSTETRICS AND GYNECOLOGY | Age: 29
End: 2019-10-11

## 2019-10-11 LAB
BLD GP AB SCN SERPL QL: NEGATIVE
ERYTHROCYTE [DISTWIDTH] IN BLOOD BY AUTOMATED COUNT: 13.2 % (ref 12.3–15.4)
GLUCOSE 1H P 50 G GLC PO SERPL-MCNC: 118 MG/DL (ref 65–179)
HCT VFR BLD AUTO: 37.6 % (ref 34–46.6)
HGB BLD-MCNC: 12.4 G/DL (ref 12–15.9)
MCH RBC QN AUTO: 30.5 PG (ref 26.6–33)
MCHC RBC AUTO-ENTMCNC: 33 G/DL (ref 31.5–35.7)
MCV RBC AUTO: 92.4 FL (ref 79–97)
PLATELET # BLD AUTO: 267 10*3/MM3 (ref 140–450)
RBC # BLD AUTO: 4.07 10*6/MM3 (ref 3.77–5.28)
WBC # BLD AUTO: 11.21 10*3/MM3 (ref 3.4–10.8)

## 2019-10-11 NOTE — TELEPHONE ENCOUNTER
----- Message from Marcelo Valdez MD sent at 10/11/2019  9:07 AM EDT -----  Please notify blood work is normal.

## 2019-10-21 ENCOUNTER — TREATMENT (OUTPATIENT)
Dept: PHYSICAL THERAPY | Facility: CLINIC | Age: 29
End: 2019-10-21

## 2019-10-21 DIAGNOSIS — M79.672 PAIN OF LEFT HEEL: Primary | ICD-10-CM

## 2019-10-21 PROCEDURE — 97110 THERAPEUTIC EXERCISES: CPT | Performed by: PHYSICAL THERAPIST

## 2019-10-21 PROCEDURE — 97161 PT EVAL LOW COMPLEX 20 MIN: CPT | Performed by: PHYSICAL THERAPIST

## 2019-10-21 NOTE — PROGRESS NOTES
Physical Therapy Initial Evaluation and Plan of Care    Patient: Marisa Case   : 1990  Diagnosis/ICD-10 Code:  Pain of left heel [M79.672]  Referring practitioner: Marcelo Valdez MD    Subjective Evaluation    History of Present Illness  Mechanism of injury: Pt began having increased L heel pain ~1 month ago.  Mornings worst time of day.  If she sits too long and goes to get up she will also have L heel pain.  Sometimes when she is walking it will hurt some and then subside.  She is wearing Birkenstocks at this time because they are most comfortable.  No imaging performed.  OBGYN referred her to PT due to her PCP no longer practicing. Has had pain refer into her arch this past weekend, but was only brief and has not occurred since.  She reports no other lingering/throbbing pain.  Tries to move her foot/ankle around in the mornings before getting out of bed, and also has her shoes right next to her bed so she can put them on right as she gets up in the morning.       Patient Occupation: /   Precautions and Work Restrictions: Pt is 7 months pregnant (due )Quality of life: good    Pain  Current pain ratin  At best pain ratin  At worst pain ratin  Location: L heel   Quality: dull ache and discomfort  Relieving factors: rest, support, relaxation and change in position  Aggravating factors: standing and ambulation  Progression: worsening    Social Support  Lives with: spouse    Diagnostic Tests  No diagnostic tests performed    Treatments  No previous or current treatments  Patient Goals  Patient goals for therapy: decreased pain and increased motion             Objective       Palpation     Additional Palpation Details  Pt only tender to palpation at base of calcaneus at plantar fascia origin.  Some slight discomfort into her arch with direct pressure.      Neurological Testing     Additional Neurological Details  WNL    Active Range of Motion   Left Ankle/Foot    Dorsiflexion (kf): 11 degrees with pain  Plantar flexion: WFL  Inversion: WFL  Eversion: WFL    Right Ankle/Foot   Normal active range of motion    Joint Play   Left Ankle/Foot  Hypomobile in the distal tibiofibular joint, talocrural joint and midfoot.     Strength/Myotome Testing     Left Ankle/Foot   Dorsiflexion: 5  Plantar flexion: 4  Inversion: 5  Eversion: 4+    Right Ankle/Foot   Dorsiflexion: 5  Plantar flexion: 4+  Inversion: 5  Eversion: 5    Tests   Left Ankle/Foot   Negative for anterior drawer, eversion talar tilt, inversion talar tilt, navicular drop, posterior drawer and Anderson.          Assessment & Plan     Assessment  Impairments: abnormal gait, abnormal or restricted ROM, activity intolerance, impaired balance, impaired physical strength and lacks appropriate home exercise program  Assessment details: Patient is a 29 y.o. year old female who presents to therapy with increased L heel/plantar region pain that worsens with weight bearing immediately standing after a period of inactivity.  She presents with significant impairments including reduced AROM/PROM, reduced L ankle joint mobility of talocrural joint, reduced gastroc/soleus complex flexibility, decreased gastroc/foot intrinsic strength, impaired single leg balance, and increased pain. Impairments affect standing tolerance, ambulation distance, and weight bearing activities. Pt will benefit from skilled physical therapy to address impairments, decrease pain and restore function.     Prognosis: good  Functional Limitations: walking, uncomfortable because of pain and standing  Goals  Plan Goals: STG: 3 weeks.   1.  Pt with be independent with HEP to improve ankle ROM and reduce pain.  2. Pt to improve his pain to <2/10 to be able to immediately stand from period of inactivity with less difficulty.  3. Pt to demonstrate full/WFL (L) ankle AROM as compared to his (R) to be able to normalize gait mechanics and perform work related activities  without difficulty.     LT weeks  1. Pt to improve his gross (L) ankle/LE strength to 5/5 by discharge to improve (L) ankle stability and activity tolerance.   2. Pt to be able to ambulate throughout full work day with min to no reports of pain/discomfort and stand when firt getting up in the morning without pain.  3. Pt to improve her FAAM outcome measure to >90% function.   4. Pt to be able to return to all recreational activities, wearing any shoes would like, stand from sitting, and ambulate without difficulty.        Plan  Therapy options: will be seen for skilled physical therapy services  Planned modality interventions: cryotherapy, electrical stimulation/Russian stimulation, TENS, thermotherapy (hydrocollator packs) and ultrasound  Planned therapy interventions: balance/weight-bearing training, flexibility, functional ROM exercises, gait training, home exercise program, joint mobilization, manual therapy, neuromuscular re-education, orthotic fitting/training, soft tissue mobilization, strengthening, stretching and therapeutic activities  Frequency: 1x week (1-2 times per week)  Duration in visits: 12  Treatment plan discussed with: patient  Plan details: Progress ROM/strengthening/stabilization/functional activity as tolerated.           History # of Personal Factors and/or Comorbidities: MODERATE (1-2)  Examination of Body System(s): # of elements: LOW (1-2)  Clinical Presentation: STABLE   Clinical Decision Making: LOW     Timed:         Manual Therapy:         mins  65148;     Therapeutic Exercise:    25     mins  81465;     Neuromuscular Carli:        mins  51644;    Therapeutic Activity:          mins  60915;     Gait Training:           mins  99505;     Ultrasound:          mins  48280;    Ionto                                   mins   67459  Self Care                            mins   13419        Un-Timed:  Electrical Stimulation:         mins  39899 ( );  Dry Needling          mins  self-pay  Traction          mins 16515  Low Eval     30     Mins  37123  Mod Eval          Mins  80761  High Eval                            Mins  13520  Re-Eval                               mins  60620        Timed Treatment:   55   mins   Total Treatment:     55   mins  PT SIGNATURE: Raya Lea PT, DPT    DATE TREATMENT INITIATED: 10/21/2019    Initial Certification  Certification Period: 1/19/2020  I certify that the therapy services are furnished while this patient is under my care.  The services outlined above are required by this patient, and will be reviewed every 90 days.     PHYSICIAN: Marcelo Valdez MD      DATE:     Please sign and return via fax to [phone].. Thank you, [facility].

## 2019-10-25 ENCOUNTER — ROUTINE PRENATAL (OUTPATIENT)
Dept: OBSTETRICS AND GYNECOLOGY | Age: 29
End: 2019-10-25

## 2019-10-25 VITALS — SYSTOLIC BLOOD PRESSURE: 124 MMHG | BODY MASS INDEX: 38.65 KG/M2 | DIASTOLIC BLOOD PRESSURE: 82 MMHG | WEIGHT: 254.2 LBS

## 2019-10-25 DIAGNOSIS — O26.893 RH NEGATIVE STATUS DURING PREGNANCY IN THIRD TRIMESTER: ICD-10-CM

## 2019-10-25 DIAGNOSIS — Z34.90 PREGNANCY, UNSPECIFIED GESTATIONAL AGE: ICD-10-CM

## 2019-10-25 DIAGNOSIS — Z67.91 RH NEGATIVE STATUS DURING PREGNANCY IN THIRD TRIMESTER: ICD-10-CM

## 2019-10-25 PROCEDURE — 0502F SUBSEQUENT PRENATAL CARE: CPT | Performed by: OBSTETRICS & GYNECOLOGY

## 2019-10-25 NOTE — PROGRESS NOTES
The patient is feeling active fetal movement.  She did have a cold but is starting to feel better.  She is taking Tylenol.  They did change the pediatrician.  No bleeding or contractions.    Doppler heart tones are positive.  Fundal height is slightly elevated at 31 cm.  Weight gain for pregnancy is low.  Blood pressure 124/82 with no protein.  Third trimester labs were normal with no anemia and normal glucose tolerance test.  Antibody screen was negative.    Assessment- 30 weeks  Rh--RhoGam has been given.  We discussed signs and symptoms of labor.   Elevated BMI-continue low-dose aspirin.  Check fetal weight at 32 weeks and plan to start BPP's at 36 weeks.

## 2019-10-28 ENCOUNTER — TREATMENT (OUTPATIENT)
Dept: PHYSICAL THERAPY | Facility: CLINIC | Age: 29
End: 2019-10-28

## 2019-10-28 DIAGNOSIS — M79.672 PAIN OF LEFT HEEL: Primary | ICD-10-CM

## 2019-10-28 PROCEDURE — 97110 THERAPEUTIC EXERCISES: CPT | Performed by: PHYSICAL THERAPIST

## 2019-10-28 PROCEDURE — 97140 MANUAL THERAPY 1/> REGIONS: CPT | Performed by: PHYSICAL THERAPIST

## 2019-10-28 NOTE — PROGRESS NOTES
Physical Therapy Daily Progress Note    VISIT#: 2    Subjective   Marisa Case reports she has been feeling some better.  She thinks the stretching are helping.  Still some discomfort when getting up from sitting to long and in the monrings.     Pain Rating (0/10): 2    Objective     See Exercise, Manual, and Modality Logs for complete treatment.     Patient Education: Updated HEP to add heel raises, DL up and SL down eccentric control, and ankle 4-way with theraband.     Assessment & Plan     Assessment  Assessment details: Pt with good response to treatment today as she had reduction on stiffness when rising/standing.  She had good control during heel raise progression without pain.         Plan  Progress strengthening /stabilization /functional activity.             Timed:         Manual Therapy:    23    mins  50167;     Therapeutic Exercise:    16    mins  34253;     Neuromuscular Carli:        mins  03839;    Therapeutic Activity:          mins  69713;     Gait Training:           mins  57354;     Ultrasound:          mins  83229;    Ionto                                   mins   69554  Self Care                            mins   87380    Un-Timed:  Electrical Stimulation:         mins  36101 ( );  Dry Needling          mins self-pay  Traction          mins 89601  Low Eval          Mins  49720  Mod Eval          Mins  56257  High Eval                            Mins  32931  Re-Eval                               mins  80008    Timed Treatment:  39    mins   Total Treatment:     39   mins    Raya Lea, PT, DPT  Physical Therapist

## 2019-11-04 ENCOUNTER — TREATMENT (OUTPATIENT)
Dept: PHYSICAL THERAPY | Facility: CLINIC | Age: 29
End: 2019-11-04

## 2019-11-04 DIAGNOSIS — M79.672 PAIN OF LEFT HEEL: Primary | ICD-10-CM

## 2019-11-04 PROCEDURE — 97140 MANUAL THERAPY 1/> REGIONS: CPT | Performed by: PHYSICAL THERAPIST

## 2019-11-04 PROCEDURE — 97110 THERAPEUTIC EXERCISES: CPT | Performed by: PHYSICAL THERAPIST

## 2019-11-04 NOTE — PROGRESS NOTES
Physical Therapy Daily Progress Note    VISIT#: 3    Subjective   Marisa Case reports she is feeling better overall.  She is having less pain in the mornings.     Pain Rating (0/10): 3/10 at worst     Objective     See Exercise, Manual, and Modality Logs for complete treatment.     Patient Education: Updated HEP adding tband lateral walks and standing balance w/LE excursion.     Assessment & Plan     Assessment  Assessment details: Progressing overall with improvements in her balance and gastroc strength.  Still requires cues to improve her eccentric control.         Plan  Progress strengthening /stabilization /functional activity            Timed:         Manual Therapy:    20     mins  51239;     Therapeutic Exercise:    23     mins  98981;     Neuromuscular Carli:        mins  16410;    Therapeutic Activity:          mins  23073;     Gait Training:           mins  75571;     Ultrasound:          mins  66048;    Ionto                                   mins   70936  Self Care                            mins   42801    Un-Timed:  Electrical Stimulation:         mins  39212 ( );  Dry Needling          mins self-pay  Traction          mins 78120  Low Eval          Mins  27762  Mod Eval          Mins  42646  High Eval                            Mins  70473  Re-Eval                               mins  26468    Timed Treatment:   43   mins   Total Treatment:     43   mins    Raya Lea, PT, DPT  Physical Therapist

## 2019-11-07 ENCOUNTER — PROCEDURE VISIT (OUTPATIENT)
Dept: OBSTETRICS AND GYNECOLOGY | Age: 29
End: 2019-11-07

## 2019-11-07 ENCOUNTER — ROUTINE PRENATAL (OUTPATIENT)
Dept: OBSTETRICS AND GYNECOLOGY | Age: 29
End: 2019-11-07

## 2019-11-07 VITALS — DIASTOLIC BLOOD PRESSURE: 62 MMHG | SYSTOLIC BLOOD PRESSURE: 110 MMHG | BODY MASS INDEX: 38.65 KG/M2 | WEIGHT: 254.2 LBS

## 2019-11-07 DIAGNOSIS — Z34.90 PREGNANCY, UNSPECIFIED GESTATIONAL AGE: Primary | ICD-10-CM

## 2019-11-07 PROCEDURE — 76816 OB US FOLLOW-UP PER FETUS: CPT | Performed by: OBSTETRICS & GYNECOLOGY

## 2019-11-07 PROCEDURE — 0502F SUBSEQUENT PRENATAL CARE: CPT | Performed by: OBSTETRICS & GYNECOLOGY

## 2019-11-07 NOTE — PROGRESS NOTES
Patient is feeling good fetal movements.  She is doing physical therapy for her plantar fasciitis.  No bleeding or contractions.  She is here for fetal weight ultrasound due to elevated BMI and minimal weight gain in pregnancy.    Ultrasound shows an estimated fetal weight of 4 pounds 6 ounces at the 52nd percentile.  Abdominal circumference is at the 41st percentile.  KATIE is 17.  Baby is vertex.  Blood pressure 110/62 with no protein  Fundal height is slightly elevated at 33 cm.    Assessment-32 weeks  Elevated BMI-continue low-dose aspirin.  Start biophysical profiles at 36 weeks and recommend kick counts.  Rh- RhoGam has been given.

## 2019-11-11 ENCOUNTER — TREATMENT (OUTPATIENT)
Dept: PHYSICAL THERAPY | Facility: CLINIC | Age: 29
End: 2019-11-11

## 2019-11-11 DIAGNOSIS — M79.672 PAIN OF LEFT HEEL: Primary | ICD-10-CM

## 2019-11-11 PROCEDURE — 97110 THERAPEUTIC EXERCISES: CPT | Performed by: PHYSICAL THERAPIST

## 2019-11-11 PROCEDURE — 97035 APP MDLTY 1+ULTRASOUND EA 15: CPT | Performed by: PHYSICAL THERAPIST

## 2019-11-11 PROCEDURE — 97140 MANUAL THERAPY 1/> REGIONS: CPT | Performed by: PHYSICAL THERAPIST

## 2019-11-11 NOTE — PROGRESS NOTES
Physical Therapy Daily Progress Note    VISIT#: 4    Subjective   Marisa Case reports she is not hurting every morning.  Has ups/downs still at this time.     Pain Rating (0/10): 2    Objective     See Exercise, Manual, and Modality Logs for complete treatment.     Patient Education: Upgraded HEP to add SLB and standing hip abd for ankle stability/balance.       Assessment & Plan     Assessment  Assessment details: Pt with reduced stability LLE compared to R.  L still slightly more tight than R noted when stretching on slantboard per pt report.         Plan  Progress strengthening /stabilization /functional activity.            Timed:         Manual Therapy:    15     mins  62413;     Therapeutic Exercise:   13      mins  89253;     Neuromuscular Carli:        mins  23609;    Therapeutic Activity:          mins  85444;     Gait Training:           mins  28375;     Ultrasound:     8     mins  58703;    Ionto                                   mins   53454  Self Care                            mins   61430    Un-Timed:  Electrical Stimulation:         mins  07506 ( );  Dry Needling          mins self-pay  Traction          mins 98640  Low Eval          Mins  04137  Mod Eval          Mins  29484  High Eval                            Mins  96327  Re-Eval                               mins  59554    Timed Treatment:   36   mins   Total Treatment:     36   mins    Raya Lea, PT, DPT  Physical Therapist

## 2019-11-19 ENCOUNTER — TREATMENT (OUTPATIENT)
Dept: PHYSICAL THERAPY | Facility: CLINIC | Age: 29
End: 2019-11-19

## 2019-11-19 DIAGNOSIS — M79.672 PAIN OF LEFT HEEL: Primary | ICD-10-CM

## 2019-11-19 PROCEDURE — 97035 APP MDLTY 1+ULTRASOUND EA 15: CPT | Performed by: PHYSICAL THERAPIST

## 2019-11-19 PROCEDURE — 97140 MANUAL THERAPY 1/> REGIONS: CPT | Performed by: PHYSICAL THERAPIST

## 2019-11-19 PROCEDURE — 97110 THERAPEUTIC EXERCISES: CPT | Performed by: PHYSICAL THERAPIST

## 2019-11-19 NOTE — PROGRESS NOTES
Physical Therapy Daily Progress Note    VISIT#: 5    Subjective   Marisa Case reports that she is still experiencing heel pain but is doing well otherwise. Pt states that she thinks the utilization of ultrasound during the previous session was beneficial.   Pain Rating (0/10): 2    Objective     See Exercise, Manual, and Modality Logs for complete treatment.       Assessment/Plan     Pt continues to demonstrate decreased DF on LLE as compared to the RLE. Pt also continues to demonstrate reduced stability on LLE as compared to RLE.     Plan  Progress strengthening /stabilization /functional activity            Timed:         Manual Therapy:    15     mins  85698;     Therapeutic Exercise:    15     mins  59543;     Neuromuscular Carli:        mins  51764;    Therapeutic Activity:          mins  66200;     Gait Training:           mins  86887;     Ultrasound:          mins  22106;    Ionto                                   mins   10743  Self Care                            mins   76057    Un-Timed:  Electrical Stimulation:         mins  75778 ( );  Dry Needling          mins self-pay  Traction          mins 79033  Ultrasound                  __8__min  23572  Low Eval          Mins  49308  Mod Eval          Mins  65094  High Eval                            Mins  51064  Re-Eval                               mins  22707    Timed Treatment:   30   mins   Total Treatment:     38   mins    Anushka Gurrola PT, DPT  Physical Therapist

## 2019-11-21 ENCOUNTER — ROUTINE PRENATAL (OUTPATIENT)
Dept: OBSTETRICS AND GYNECOLOGY | Age: 29
End: 2019-11-21

## 2019-11-21 VITALS — WEIGHT: 259 LBS | DIASTOLIC BLOOD PRESSURE: 80 MMHG | SYSTOLIC BLOOD PRESSURE: 118 MMHG | BODY MASS INDEX: 39.38 KG/M2

## 2019-11-21 DIAGNOSIS — Z34.90 PREGNANCY, UNSPECIFIED GESTATIONAL AGE: ICD-10-CM

## 2019-11-21 PROCEDURE — 0502F SUBSEQUENT PRENATAL CARE: CPT | Performed by: OBSTETRICS & GYNECOLOGY

## 2019-11-21 NOTE — PROGRESS NOTES
Patient has no complaints.  She feels good fetal movements.    Blood pressure is normal at 118/80 with no protein.  Fundal height is appropriate at 35 cm.  Doppler heart tones are positive.  Baby is vertex by Leopold's.    Assessment-34 weeks  Elevated BMI-continue low-dose aspirin and start biophysical profiles in 2 weeks.  Recommend kick counts.  We discussed B strep testing.

## 2019-11-25 ENCOUNTER — TREATMENT (OUTPATIENT)
Dept: PHYSICAL THERAPY | Facility: CLINIC | Age: 29
End: 2019-11-25

## 2019-11-25 DIAGNOSIS — M79.672 PAIN OF LEFT HEEL: Primary | ICD-10-CM

## 2019-11-25 PROCEDURE — 97140 MANUAL THERAPY 1/> REGIONS: CPT | Performed by: PHYSICAL THERAPIST

## 2019-11-25 PROCEDURE — 97110 THERAPEUTIC EXERCISES: CPT | Performed by: PHYSICAL THERAPIST

## 2019-11-25 NOTE — PROGRESS NOTES
Physical Therapy Daily Progress Note    VISIT#: 6/12 in POC.     Subjective   Marisa Case reports she is doing better this week.      Pain Rating (0/10): 0    Objective     See Exercise, Manual, and Modality Logs for complete treatment.     Patient Education:  Discussed with pt continued HEP and progress to SL heel raises as well as squats.     Assessment & Plan     Assessment  Assessment details: Pt had some slight pain reports with SL heel raise - regressed to Total gym. She was able to perform without pain increase on total gym with good ROM.  Still some ankle instability/balane deficits.        Plan  Progress strengthening /stabilization /functional activity.             Timed:         Manual Therapy:    12     mins  13592;     Therapeutic Exercise:     23    mins  21700;     Neuromuscular Carli:        mins  78121;    Therapeutic Activity:          mins  71139;     Gait Training:           mins  21232;     Ultrasound:          mins  68019;    Ionto                                   mins   35987  Self Care                            mins   16401    Un-Timed:  Electrical Stimulation:         mins  11401 ( );  Dry Needling          mins self-pay  Traction          mins 89166  Low Eval          Mins  02942  Mod Eval          Mins  78193  High Eval                            Mins  58599  Re-Eval                               mins  79413    Timed Treatment:  35    mins   Total Treatment:     35   mins    Raya Lea, PT, DPT  Physical Therapist

## 2019-12-02 ENCOUNTER — TREATMENT (OUTPATIENT)
Dept: PHYSICAL THERAPY | Facility: CLINIC | Age: 29
End: 2019-12-02

## 2019-12-02 DIAGNOSIS — M79.672 PAIN OF LEFT HEEL: Primary | ICD-10-CM

## 2019-12-02 PROCEDURE — 97110 THERAPEUTIC EXERCISES: CPT | Performed by: PHYSICAL THERAPIST

## 2019-12-02 PROCEDURE — 97140 MANUAL THERAPY 1/> REGIONS: CPT | Performed by: PHYSICAL THERAPIST

## 2019-12-02 NOTE — PROGRESS NOTES
Physical Therapy Daily Progress Note    VISIT#: 7/12 in POC.     Subjective   Marisa Case reports she has been doing ok, but having some up/down days.  Her baby has been moving a lot more and she thinks it has affecting weight shifting.      Pain Rating (0/10): 1    Objective     See Exercise, Manual, and Modality Logs for complete treatment.     Assessment & Plan     Assessment  Assessment details: Slight improvement in heel raise progression performance.  No pain during therex, but still lacks full stability LLE compared to R.         Plan  Progress strengthening /stabilization /functional activity.  Consider d/c pending pt as she plans to call us in the next week to see how she is doing.            Timed:         Manual Therapy:    15     mins  74471;     Therapeutic Exercise:    25     mins  08991;     Neuromuscular Carli:        mins  13824;    Therapeutic Activity:          mins  14012;     Gait Training:           mins  75691;     Ultrasound:          mins  84211;    Ionto                                   mins   23962  Self Care                            mins   07794    Un-Timed:  Electrical Stimulation:         mins  49329 ( );  Dry Needling          mins self-pay  Traction          mins 03499  Low Eval          Mins  75499  Mod Eval          Mins  00783  High Eval                            Mins  63508  Re-Eval                               mins  73513    Timed Treatment:   40   mins   Total Treatment:     40   mins    Raya Lea, PT, DPT  Physical Therapist

## 2019-12-05 ENCOUNTER — PROCEDURE VISIT (OUTPATIENT)
Dept: OBSTETRICS AND GYNECOLOGY | Age: 29
End: 2019-12-05

## 2019-12-05 ENCOUNTER — ROUTINE PRENATAL (OUTPATIENT)
Dept: OBSTETRICS AND GYNECOLOGY | Age: 29
End: 2019-12-05

## 2019-12-05 VITALS — BODY MASS INDEX: 39.84 KG/M2 | SYSTOLIC BLOOD PRESSURE: 126 MMHG | DIASTOLIC BLOOD PRESSURE: 84 MMHG | WEIGHT: 262 LBS

## 2019-12-05 DIAGNOSIS — Z36.85 ANTENATAL SCREENING FOR STREPTOCOCCUS B: Primary | ICD-10-CM

## 2019-12-05 DIAGNOSIS — Z34.90 PREGNANCY, UNSPECIFIED GESTATIONAL AGE: ICD-10-CM

## 2019-12-05 PROCEDURE — 0502F SUBSEQUENT PRENATAL CARE: CPT | Performed by: OBSTETRICS & GYNECOLOGY

## 2019-12-05 PROCEDURE — 76819 FETAL BIOPHYS PROFIL W/O NST: CPT | Performed by: OBSTETRICS & GYNECOLOGY

## 2019-12-05 NOTE — PROGRESS NOTES
Patient reports good fetal movement.  She brought in her birth plan.  It does have intermittent monitoring and Hep-Lock of IV fluids.    Biophysical profile is 8 out of 8.  KATIE is 13.  Baby is vertex.  Group B strep swab is checked today.  Cervix is closed moderate consistency and posterior.  Blood pressure 126/84 with no protein.  Fundal height is appropriate at 36 cm.    Assessment-36 weeks  Elevated BMI-continue low-dose aspirin and physical profiles.  We discussed delivery timing.  We discussed that induction of labor can be done from 39 weeks on.  We discussed advantages of a 39-week induction versus awaiting labor.  We will continue to discuss.  Birth plan was reviewed-I recommend continuous monitoring during active labor and IV fluids. We discussed the risk of intermittent monitoring and dehydration.  Per discussion they want to do continuous monitoring and IV fluids.

## 2019-12-10 ENCOUNTER — TELEPHONE (OUTPATIENT)
Dept: OBSTETRICS AND GYNECOLOGY | Age: 29
End: 2019-12-10

## 2019-12-10 PROBLEM — O99.820 GBS (GROUP B STREPTOCOCCUS CARRIER), +RV CULTURE, CURRENTLY PREGNANT: Status: ACTIVE | Noted: 2019-12-10

## 2019-12-10 LAB
CLINDAMYCIN ISLT KB: NORMAL
GP B STREP DNA SPEC QL NAA+PROBE: POSITIVE
ORGANISM ID: NORMAL

## 2019-12-12 ENCOUNTER — ROUTINE PRENATAL (OUTPATIENT)
Dept: OBSTETRICS AND GYNECOLOGY | Age: 29
End: 2019-12-12

## 2019-12-12 ENCOUNTER — PROCEDURE VISIT (OUTPATIENT)
Dept: OBSTETRICS AND GYNECOLOGY | Age: 29
End: 2019-12-12

## 2019-12-12 VITALS — WEIGHT: 262 LBS | SYSTOLIC BLOOD PRESSURE: 110 MMHG | BODY MASS INDEX: 39.84 KG/M2 | DIASTOLIC BLOOD PRESSURE: 76 MMHG

## 2019-12-12 DIAGNOSIS — Z3A.37 37 WEEKS GESTATION OF PREGNANCY: ICD-10-CM

## 2019-12-12 DIAGNOSIS — Z36.85 ANTENATAL SCREENING FOR STREPTOCOCCUS B: ICD-10-CM

## 2019-12-12 DIAGNOSIS — Z13.89 SCREENING FOR BLOOD OR PROTEIN IN URINE: ICD-10-CM

## 2019-12-12 DIAGNOSIS — Z34.83 PRENATAL CARE, SUBSEQUENT PREGNANCY, THIRD TRIMESTER: Primary | ICD-10-CM

## 2019-12-12 DIAGNOSIS — B95.1 POSITIVE GBS TEST: ICD-10-CM

## 2019-12-12 LAB
BILIRUB BLD-MCNC: NEGATIVE MG/DL
CLARITY, POC: CLEAR
COLOR UR: YELLOW
GLUCOSE UR STRIP-MCNC: NEGATIVE MG/DL
KETONES UR QL: NEGATIVE
LEUKOCYTE EST, POC: NEGATIVE
NITRITE UR-MCNC: NEGATIVE MG/ML
PH UR: 6 [PH] (ref 5–8)
PROT UR STRIP-MCNC: NEGATIVE MG/DL
RBC # UR STRIP: NEGATIVE /UL
SP GR UR: 1.03 (ref 1–1.03)
UROBILINOGEN UR QL: NORMAL

## 2019-12-12 PROCEDURE — 76819 FETAL BIOPHYS PROFIL W/O NST: CPT | Performed by: OBSTETRICS & GYNECOLOGY

## 2019-12-12 PROCEDURE — 81002 URINALYSIS NONAUTO W/O SCOPE: CPT | Performed by: OBSTETRICS & GYNECOLOGY

## 2019-12-12 PROCEDURE — 0502F SUBSEQUENT PRENATAL CARE: CPT | Performed by: OBSTETRICS & GYNECOLOGY

## 2019-12-12 NOTE — PROGRESS NOTES
Patient reports very active fetal movements.  No contractions.    Group B strep came back positive but sensitivities were unable to be run.  Group B strep swab was repeated today.  Cervix is closed and thick.  Cervix is moderate consistency and posterior.  Blood pressure 110/76  Ultrasound shows an estimated fetal weight of 7 pounds 9 ounces at the 76 percentile.  KATIE is 8.2 with maximal vertical pocket of 3.8 cm.  Baby is vertex.  Biophysical profile is 8 out of 8.    Assessment-37 weeks  Elevated BMI- continue low-dose aspirin and weekly BPP's.  Continue kick counts.  LGA-we discussed weight at the 76 percentile.  We discussed usual weight gain of 1/2 pound per week which would be 9 pounds at 40 weeks.  Offered induction of labor.  Discussed possibly 29 December or January 2.  The patient and her  think they will decline induction but would like to continue to discuss.

## 2019-12-14 LAB — GP B STREP DNA SPEC QL NAA+PROBE: NEGATIVE

## 2019-12-16 ENCOUNTER — TELEPHONE (OUTPATIENT)
Dept: OBSTETRICS AND GYNECOLOGY | Age: 29
End: 2019-12-16

## 2019-12-16 NOTE — TELEPHONE ENCOUNTER
----- Message from Marcelo Valdez MD sent at 12/16/2019  8:35 AM EST -----  Please notify repeat group B strep came back negative.  Because prior test was positive we will treat during labor.

## 2019-12-19 ENCOUNTER — ROUTINE PRENATAL (OUTPATIENT)
Dept: OBSTETRICS AND GYNECOLOGY | Age: 29
End: 2019-12-19

## 2019-12-19 ENCOUNTER — PROCEDURE VISIT (OUTPATIENT)
Dept: OBSTETRICS AND GYNECOLOGY | Age: 29
End: 2019-12-19

## 2019-12-19 VITALS — SYSTOLIC BLOOD PRESSURE: 128 MMHG | BODY MASS INDEX: 40.29 KG/M2 | DIASTOLIC BLOOD PRESSURE: 80 MMHG | WEIGHT: 265 LBS

## 2019-12-19 DIAGNOSIS — Z34.90 PREGNANCY, UNSPECIFIED GESTATIONAL AGE: ICD-10-CM

## 2019-12-19 DIAGNOSIS — Z13.89 SCREENING FOR HEMATURIA OR PROTEINURIA: Primary | ICD-10-CM

## 2019-12-19 DIAGNOSIS — O99.820 GBS (GROUP B STREPTOCOCCUS CARRIER), +RV CULTURE, CURRENTLY PREGNANT: ICD-10-CM

## 2019-12-19 DIAGNOSIS — E66.9 OBESITY (BMI 30-39.9): Primary | ICD-10-CM

## 2019-12-19 LAB
GLUCOSE UR STRIP-MCNC: NEGATIVE MG/DL
PROT UR STRIP-MCNC: NEGATIVE MG/DL

## 2019-12-19 PROCEDURE — 76819 FETAL BIOPHYS PROFIL W/O NST: CPT | Performed by: OBSTETRICS & GYNECOLOGY

## 2019-12-19 PROCEDURE — 81002 URINALYSIS NONAUTO W/O SCOPE: CPT | Performed by: OBSTETRICS & GYNECOLOGY

## 2019-12-19 PROCEDURE — 0502F SUBSEQUENT PRENATAL CARE: CPT | Performed by: OBSTETRICS & GYNECOLOGY

## 2019-12-19 NOTE — PROGRESS NOTES
The patient is having good fetal movements.  No contractions or vaginal bleeding.    Repeat group B strep came back negative.  Cervix is 1 cm 50% and mid position.  Blood pressure 128/80 with no protein.  Biophysical profile is 8 out of 8.  KATIE is 9.8 cm with maximal vertical pocket of 3.49 cm.  Baby is vertex.  Fundal height is 39 cm.    Assessment-38 weeks  Elevated BMI-continue low-dose aspirin and weekly BPP's.  Patient desires induction of labor with cervical ripening on January 1 if she does not go into labor prior.  We will schedule today.  Recommend kick counts.  LGA-we discussed weight at the 76 percentile with estimated weight at 40 weeks of 9 pounds.  We discussed risk of macrosomia.  Patient desires induction of labor but declines induction of labor until 40 weeks.  Group B strep positive-we discussed positive screen with penicillin allergy.  Sensitivities were unable to be ran so I repeated the group B strep swab but it came back negative.  We will plan for vancomycin in labor due to history of penicillin allergy.

## 2019-12-24 ENCOUNTER — TELEPHONE (OUTPATIENT)
Dept: OBSTETRICS AND GYNECOLOGY | Age: 29
End: 2019-12-24

## 2019-12-26 ENCOUNTER — PROCEDURE VISIT (OUTPATIENT)
Dept: OBSTETRICS AND GYNECOLOGY | Age: 29
End: 2019-12-26

## 2019-12-26 ENCOUNTER — ROUTINE PRENATAL (OUTPATIENT)
Dept: OBSTETRICS AND GYNECOLOGY | Age: 29
End: 2019-12-26

## 2019-12-26 VITALS — WEIGHT: 267 LBS | SYSTOLIC BLOOD PRESSURE: 124 MMHG | BODY MASS INDEX: 40.6 KG/M2 | DIASTOLIC BLOOD PRESSURE: 80 MMHG

## 2019-12-26 DIAGNOSIS — E66.9 OBESITY (BMI 30-39.9): Primary | ICD-10-CM

## 2019-12-26 DIAGNOSIS — Z87.59 HISTORY OF MISCARRIAGE: ICD-10-CM

## 2019-12-26 DIAGNOSIS — Z67.91 RH NEGATIVE STATUS DURING PREGNANCY IN THIRD TRIMESTER: ICD-10-CM

## 2019-12-26 DIAGNOSIS — O99.820 GBS (GROUP B STREPTOCOCCUS CARRIER), +RV CULTURE, CURRENTLY PREGNANT: ICD-10-CM

## 2019-12-26 DIAGNOSIS — O26.893 RH NEGATIVE STATUS DURING PREGNANCY IN THIRD TRIMESTER: ICD-10-CM

## 2019-12-26 DIAGNOSIS — Z87.59 HISTORY OF MOLAR PREGNANCY: ICD-10-CM

## 2019-12-26 DIAGNOSIS — Z13.89 SCREENING FOR BLOOD OR PROTEIN IN URINE: Primary | ICD-10-CM

## 2019-12-26 DIAGNOSIS — Z3A.39 39 WEEKS GESTATION OF PREGNANCY: ICD-10-CM

## 2019-12-26 LAB
BILIRUB BLD-MCNC: NEGATIVE MG/DL
CLARITY, POC: CLEAR
COLOR UR: YELLOW
GLUCOSE UR STRIP-MCNC: NEGATIVE MG/DL
KETONES UR QL: NEGATIVE
LEUKOCYTE EST, POC: NEGATIVE
NITRITE UR-MCNC: NEGATIVE MG/ML
PH UR: 5 [PH] (ref 5–8)
PROT UR STRIP-MCNC: NEGATIVE MG/DL
RBC # UR STRIP: NEGATIVE /UL
SP GR UR: 1.01 (ref 1–1.03)
UROBILINOGEN UR QL: NORMAL

## 2019-12-26 PROCEDURE — 0502F SUBSEQUENT PRENATAL CARE: CPT | Performed by: OBSTETRICS & GYNECOLOGY

## 2019-12-26 PROCEDURE — 76818 FETAL BIOPHYS PROFILE W/NST: CPT | Performed by: OBSTETRICS & GYNECOLOGY

## 2019-12-26 PROCEDURE — 81003 URINALYSIS AUTO W/O SCOPE: CPT | Performed by: OBSTETRICS & GYNECOLOGY

## 2019-12-26 NOTE — PROGRESS NOTES
Chief Complaint   Patient presents with   • Routine Prenatal Visit     See MD note. PA had to leave due to illness.

## 2019-12-26 NOTE — PROGRESS NOTES
Pt presents for routine visit. She was scheduled with PA who had to leave office. She denies any issues today. She notes active fetal movement. She denies any bleeding/leaking fluid.  O: BPP 8/8, georgi 9.9  A/p: Reviewed plan as pt has made with her primary OB. They were considering IOL either this weekend or next week. They note they have carefully considered options and prefer to observe for now until scheduled IOL on 1/2/20. They have scheduled appt with Dr. Valdez on 12/31/20. Advised she continue daily fmc' s and reviewed labor    Rh neg: pt received rhogam at 28 weeks    Increased BMI: BPP done today and reassuring at 8/8    GBS +: primary OB plans Vanc in labor, pt aware

## 2019-12-31 ENCOUNTER — ROUTINE PRENATAL (OUTPATIENT)
Dept: OBSTETRICS AND GYNECOLOGY | Age: 29
End: 2019-12-31

## 2019-12-31 ENCOUNTER — PROCEDURE VISIT (OUTPATIENT)
Dept: OBSTETRICS AND GYNECOLOGY | Age: 29
End: 2019-12-31

## 2019-12-31 VITALS — BODY MASS INDEX: 40.29 KG/M2 | WEIGHT: 265 LBS | DIASTOLIC BLOOD PRESSURE: 82 MMHG | SYSTOLIC BLOOD PRESSURE: 124 MMHG

## 2019-12-31 DIAGNOSIS — O99.820 GBS (GROUP B STREPTOCOCCUS CARRIER), +RV CULTURE, CURRENTLY PREGNANT: ICD-10-CM

## 2019-12-31 DIAGNOSIS — Z3A.39 39 WEEKS GESTATION OF PREGNANCY: Primary | ICD-10-CM

## 2019-12-31 DIAGNOSIS — Z13.89 SCREENING FOR HEMATURIA OR PROTEINURIA: ICD-10-CM

## 2019-12-31 DIAGNOSIS — R63.8 INCREASED BMI: Primary | ICD-10-CM

## 2019-12-31 DIAGNOSIS — Z67.91 RH NEGATIVE, ANTEPARTUM: ICD-10-CM

## 2019-12-31 DIAGNOSIS — O26.899 RH NEGATIVE, ANTEPARTUM: ICD-10-CM

## 2019-12-31 LAB
CLARITY, POC: CLEAR
COLOR UR: YELLOW
GLUCOSE UR STRIP-MCNC: NEGATIVE MG/DL
PROT UR STRIP-MCNC: ABNORMAL MG/DL

## 2019-12-31 PROCEDURE — 76819 FETAL BIOPHYS PROFIL W/O NST: CPT | Performed by: OBSTETRICS & GYNECOLOGY

## 2019-12-31 PROCEDURE — 0502F SUBSEQUENT PRENATAL CARE: CPT | Performed by: OBSTETRICS & GYNECOLOGY

## 2019-12-31 PROCEDURE — 81002 URINALYSIS NONAUTO W/O SCOPE: CPT | Performed by: OBSTETRICS & GYNECOLOGY

## 2019-12-31 PROCEDURE — 76816 OB US FOLLOW-UP PER FETUS: CPT | Performed by: OBSTETRICS & GYNECOLOGY

## 2019-12-31 NOTE — PROGRESS NOTES
Patient is feeling very active fetal movements.  She has had some mild cramping.    Cervix is unchanged fingertip 50% and -3 station.  Ultrasound shows estimated fetal weight of 8 pounds 5 ounces at the 74th percentile.  KATIE is 8.5 cm.  Maximal vertical pocket is 3.4 cm.  Biophysical profile is 8 of 8.  Baby is vertex.  Blood pressure 124/82 with trace protein    Assessment- 39 weeks 6 days  Elevated BMI- cervix is still unfavorable.  We discussed induction of labor in detail.  Patient will have cervical ripening with Cervidil.  Biophysical profile is reassuring today.  Patient declined earlier induction of labor.  Recommend continued kick counts.  Group B strep positive with penicillin allergy.  The lab was unable to do sensitivities due to inadequate amount and repeat culture came back negative.  Due to potential clindamycin resistance will do vancomycin.

## 2020-01-02 ENCOUNTER — HOSPITAL ENCOUNTER (INPATIENT)
Facility: HOSPITAL | Age: 30
LOS: 3 days | Discharge: HOME | End: 2020-01-05
Attending: OBSTETRICS & GYNECOLOGY | Admitting: OBSTETRICS & GYNECOLOGY

## 2020-01-02 ENCOUNTER — ANESTHESIA EVENT (OUTPATIENT)
Dept: LABOR AND DELIVERY | Facility: HOSPITAL | Age: 30
End: 2020-01-02

## 2020-01-02 ENCOUNTER — HOSPITAL ENCOUNTER (OUTPATIENT)
Dept: LABOR AND DELIVERY | Facility: HOSPITAL | Age: 30
Discharge: HOME | End: 2020-01-02

## 2020-01-02 ENCOUNTER — ANESTHESIA (OUTPATIENT)
Dept: LABOR AND DELIVERY | Facility: HOSPITAL | Age: 30
End: 2020-01-02

## 2020-01-02 LAB
ABO GROUP BLD: NORMAL
BASOPHILS # BLD AUTO: 0.05 10*3/MM3 (ref 0–0.2)
BASOPHILS NFR BLD AUTO: 0.4 % (ref 0–1.5)
BLD GP AB SCN SERPL QL: NEGATIVE
DEPRECATED RDW RBC AUTO: 42.9 FL (ref 37–54)
EOSINOPHIL # BLD AUTO: 0.15 10*3/MM3 (ref 0–0.4)
EOSINOPHIL NFR BLD AUTO: 1.2 % (ref 0.3–6.2)
ERYTHROCYTE [DISTWIDTH] IN BLOOD BY AUTOMATED COUNT: 13.1 % (ref 12.3–15.4)
HCT VFR BLD AUTO: 34.7 % (ref 34–46.6)
HGB BLD-MCNC: 11.7 G/DL (ref 12–15.9)
IMM GRANULOCYTES # BLD AUTO: 0.05 10*3/MM3 (ref 0–0.05)
IMM GRANULOCYTES NFR BLD AUTO: 0.4 % (ref 0–0.5)
LYMPHOCYTES # BLD AUTO: 2.77 10*3/MM3 (ref 0.7–3.1)
LYMPHOCYTES NFR BLD AUTO: 21.8 % (ref 19.6–45.3)
MCH RBC QN AUTO: 30.8 PG (ref 26.6–33)
MCHC RBC AUTO-ENTMCNC: 33.7 G/DL (ref 31.5–35.7)
MCV RBC AUTO: 91.3 FL (ref 79–97)
MONOCYTES # BLD AUTO: 1.01 10*3/MM3 (ref 0.1–0.9)
MONOCYTES NFR BLD AUTO: 7.9 % (ref 5–12)
NEUTROPHILS # BLD AUTO: 8.68 10*3/MM3 (ref 1.7–7)
NEUTROPHILS NFR BLD AUTO: 68.3 % (ref 42.7–76)
NRBC BLD AUTO-RTO: 0 /100 WBC (ref 0–0.2)
PLATELET # BLD AUTO: 221 10*3/MM3 (ref 140–450)
PMV BLD AUTO: 10.3 FL (ref 6–12)
RBC # BLD AUTO: 3.8 10*6/MM3 (ref 3.77–5.28)
RH BLD: NEGATIVE
T&S EXPIRATION DATE: NORMAL
WBC NRBC COR # BLD: 12.71 10*3/MM3 (ref 3.4–10.8)

## 2020-01-02 PROCEDURE — 25010000002 ONDANSETRON PER 1 MG: Performed by: OBSTETRICS & GYNECOLOGY

## 2020-01-02 PROCEDURE — C1755 CATHETER, INTRASPINAL: HCPCS | Performed by: ANESTHESIOLOGY

## 2020-01-02 PROCEDURE — 85025 COMPLETE CBC W/AUTO DIFF WBC: CPT | Performed by: OBSTETRICS & GYNECOLOGY

## 2020-01-02 PROCEDURE — 86900 BLOOD TYPING SEROLOGIC ABO: CPT | Performed by: OBSTETRICS & GYNECOLOGY

## 2020-01-02 PROCEDURE — 25010000002 VANCOMYCIN PER 500 MG: Performed by: OBSTETRICS & GYNECOLOGY

## 2020-01-02 PROCEDURE — 86901 BLOOD TYPING SEROLOGIC RH(D): CPT | Performed by: OBSTETRICS & GYNECOLOGY

## 2020-01-02 PROCEDURE — 86850 RBC ANTIBODY SCREEN: CPT | Performed by: OBSTETRICS & GYNECOLOGY

## 2020-01-02 RX ORDER — ONDANSETRON 4 MG/1
4 TABLET, FILM COATED ORAL EVERY 6 HOURS PRN
Status: DISCONTINUED | OUTPATIENT
Start: 2020-01-02 | End: 2020-01-03 | Stop reason: HOSPADM

## 2020-01-02 RX ORDER — DIPHENHYDRAMINE HYDROCHLORIDE 50 MG/ML
12.5 INJECTION INTRAMUSCULAR; INTRAVENOUS EVERY 8 HOURS PRN
Status: DISCONTINUED | OUTPATIENT
Start: 2020-01-02 | End: 2020-01-03 | Stop reason: HOSPADM

## 2020-01-02 RX ORDER — LIDOCAINE HYDROCHLORIDE 10 MG/ML
5 INJECTION, SOLUTION EPIDURAL; INFILTRATION; INTRACAUDAL; PERINEURAL AS NEEDED
Status: DISCONTINUED | OUTPATIENT
Start: 2020-01-02 | End: 2020-01-03 | Stop reason: HOSPADM

## 2020-01-02 RX ORDER — LIDOCAINE HYDROCHLORIDE AND EPINEPHRINE 15; 5 MG/ML; UG/ML
INJECTION, SOLUTION EPIDURAL AS NEEDED
Status: DISCONTINUED | OUTPATIENT
Start: 2020-01-02 | End: 2020-01-03 | Stop reason: SURG

## 2020-01-02 RX ORDER — SODIUM CHLORIDE 0.9 % (FLUSH) 0.9 %
3 SYRINGE (ML) INJECTION EVERY 12 HOURS SCHEDULED
Status: DISCONTINUED | OUTPATIENT
Start: 2020-01-02 | End: 2020-01-03 | Stop reason: HOSPADM

## 2020-01-02 RX ORDER — ACETAMINOPHEN 325 MG/1
650 TABLET ORAL EVERY 4 HOURS PRN
Status: DISCONTINUED | OUTPATIENT
Start: 2020-01-02 | End: 2020-01-03 | Stop reason: HOSPADM

## 2020-01-02 RX ORDER — SODIUM CHLORIDE 0.9 % (FLUSH) 0.9 %
10 SYRINGE (ML) INJECTION AS NEEDED
Status: DISCONTINUED | OUTPATIENT
Start: 2020-01-02 | End: 2020-01-03 | Stop reason: HOSPADM

## 2020-01-02 RX ORDER — TERBUTALINE SULFATE 1 MG/ML
0.25 INJECTION, SOLUTION SUBCUTANEOUS AS NEEDED
Status: DISCONTINUED | OUTPATIENT
Start: 2020-01-02 | End: 2020-01-03 | Stop reason: HOSPADM

## 2020-01-02 RX ORDER — FAMOTIDINE 10 MG/ML
20 INJECTION, SOLUTION INTRAVENOUS ONCE AS NEEDED
Status: COMPLETED | OUTPATIENT
Start: 2020-01-02 | End: 2020-01-02

## 2020-01-02 RX ORDER — FAMOTIDINE 20 MG/1
20 TABLET, FILM COATED ORAL EVERY 12 HOURS SCHEDULED
Status: DISCONTINUED | OUTPATIENT
Start: 2020-01-02 | End: 2020-01-03 | Stop reason: HOSPADM

## 2020-01-02 RX ORDER — SODIUM CHLORIDE, SODIUM LACTATE, POTASSIUM CHLORIDE, CALCIUM CHLORIDE 600; 310; 30; 20 MG/100ML; MG/100ML; MG/100ML; MG/100ML
125 INJECTION, SOLUTION INTRAVENOUS CONTINUOUS
Status: DISCONTINUED | OUTPATIENT
Start: 2020-01-02 | End: 2020-01-04

## 2020-01-02 RX ORDER — FAMOTIDINE 10 MG/ML
20 INJECTION, SOLUTION INTRAVENOUS EVERY 12 HOURS SCHEDULED
Status: DISCONTINUED | OUTPATIENT
Start: 2020-01-02 | End: 2020-01-03 | Stop reason: HOSPADM

## 2020-01-02 RX ORDER — VANCOMYCIN HYDROCHLORIDE 1 G/200ML
1 INJECTION, SOLUTION INTRAVENOUS EVERY 12 HOURS
Status: DISCONTINUED | OUTPATIENT
Start: 2020-01-02 | End: 2020-01-03 | Stop reason: HOSPADM

## 2020-01-02 RX ORDER — OXYTOCIN-SODIUM CHLORIDE 0.9% IV SOLN 30 UNIT/500ML 30-0.9/5 UT/ML-%
2-30 SOLUTION INTRAVENOUS
Status: DISCONTINUED | OUTPATIENT
Start: 2020-01-02 | End: 2020-01-03 | Stop reason: HOSPADM

## 2020-01-02 RX ORDER — ONDANSETRON 2 MG/ML
4 INJECTION INTRAMUSCULAR; INTRAVENOUS EVERY 6 HOURS PRN
Status: DISCONTINUED | OUTPATIENT
Start: 2020-01-02 | End: 2020-01-03 | Stop reason: HOSPADM

## 2020-01-02 RX ORDER — EPHEDRINE SULFATE 50 MG/ML
5 INJECTION, SOLUTION INTRAVENOUS AS NEEDED
Status: DISCONTINUED | OUTPATIENT
Start: 2020-01-02 | End: 2020-01-03 | Stop reason: HOSPADM

## 2020-01-02 RX ORDER — ONDANSETRON 2 MG/ML
4 INJECTION INTRAMUSCULAR; INTRAVENOUS ONCE AS NEEDED
Status: DISCONTINUED | OUTPATIENT
Start: 2020-01-02 | End: 2020-01-03 | Stop reason: HOSPADM

## 2020-01-02 RX ADMIN — DINOPROSTONE 10 MG: 10 INSERT VAGINAL at 01:40

## 2020-01-02 RX ADMIN — SODIUM CHLORIDE, POTASSIUM CHLORIDE, SODIUM LACTATE AND CALCIUM CHLORIDE 125 ML/HR: 600; 310; 30; 20 INJECTION, SOLUTION INTRAVENOUS at 18:35

## 2020-01-02 RX ADMIN — LIDOCAINE HYDROCHLORIDE AND EPINEPHRINE 3 ML: 15; 5 INJECTION, SOLUTION EPIDURAL at 18:02

## 2020-01-02 RX ADMIN — ROPIVACAINE HYDROCHLORIDE 10 ML/HR: 2 INJECTION, SOLUTION EPIDURAL; INFILTRATION at 18:05

## 2020-01-02 RX ADMIN — SODIUM CHLORIDE, POTASSIUM CHLORIDE, SODIUM LACTATE AND CALCIUM CHLORIDE 125 ML/HR: 600; 310; 30; 20 INJECTION, SOLUTION INTRAVENOUS at 17:29

## 2020-01-02 RX ADMIN — SODIUM CHLORIDE, POTASSIUM CHLORIDE, SODIUM LACTATE AND CALCIUM CHLORIDE 125 ML/HR: 600; 310; 30; 20 INJECTION, SOLUTION INTRAVENOUS at 01:39

## 2020-01-02 RX ADMIN — VANCOMYCIN HYDROCHLORIDE 1 G: 1 INJECTION, SOLUTION INTRAVENOUS at 13:57

## 2020-01-02 RX ADMIN — OXYTOCIN 2 MILLI-UNITS/MIN: 10 INJECTION, SOLUTION INTRAMUSCULAR; INTRAVENOUS at 13:58

## 2020-01-02 RX ADMIN — ONDANSETRON 4 MG: 2 INJECTION INTRAMUSCULAR; INTRAVENOUS at 19:12

## 2020-01-02 RX ADMIN — FAMOTIDINE 20 MG: 10 INJECTION INTRAVENOUS at 17:51

## 2020-01-02 RX ADMIN — SODIUM CHLORIDE, POTASSIUM CHLORIDE, SODIUM LACTATE AND CALCIUM CHLORIDE 125 ML/HR: 600; 310; 30; 20 INJECTION, SOLUTION INTRAVENOUS at 09:23

## 2020-01-02 NOTE — ANESTHESIA PREPROCEDURE EVALUATION
Anesthesia Evaluation     Patient summary reviewed and Nursing notes reviewed                Airway   Mallampati: II  TM distance: >3 FB  Neck ROM: full  Dental - normal exam     Pulmonary - negative pulmonary ROS and normal exam   Cardiovascular - negative cardio ROS and normal exam        Neuro/Psych- negative ROS  GI/Hepatic/Renal/Endo    (+) obesity,       Musculoskeletal (-) negative ROS    Abdominal    Substance History - negative use     OB/GYN    (+) Pregnant,         Other                        Anesthesia Plan    ASA 3     epidural       Anesthetic plan, all risks, benefits, and alternatives have been provided, discussed and informed consent has been obtained with: patient.

## 2020-01-02 NOTE — PLAN OF CARE
Problem: Patient Care Overview  Goal: Plan of Care Review  Outcome: Ongoing (interventions implemented as appropriate)  Flowsheets  Taken 1/2/2020 0733 by Bradly Hicks, RN  Progress: improving  Plan of Care Reviewed With: patient;spouse  Taken 1/2/2020 1837 by Shannan Chavez, RN  Outcome Summary: Pt continuing to labor.  Pitocin titrated for effective labor pattern.  Pt used nitrous, but requested epidural.  Epidural in place and pt resting comfortably.  Continue to monitor labor pattern and EFM tracing.

## 2020-01-02 NOTE — PROGRESS NOTES
The patient is feeling mild cramping with the Cervidil.  It is due to be removed.    Cervix is mid position 2 cm and 50% effaced.  Fetal heart rate tracing is category 1.    Good cervical change with the Cervidil.  Start Pitocin and vancomycin for GBS.  Patient has penicillin allergy but sensitivities could not be run for clindamycin so we will treat with vancomycin.

## 2020-01-02 NOTE — ANESTHESIA PROCEDURE NOTES
Labor Epidural      Patient reassessed immediately prior to procedure    Patient location during procedure: OB  Performed By  Anesthesiologist: Dann Bay MD  Preanesthetic Checklist  Completed: patient identified, surgical consent, pre-op evaluation, timeout performed, IV checked, risks and benefits discussed and monitors and equipment checked  Additional Notes  LMP 03/27/2019     Prep:  Pt Position:sitting  Sterile Tech:cap, gloves, mask and sterile barrier  Prep:chlorhexidine gluconate and isopropyl alcohol  Monitoring:blood pressure monitoring, continuous pulse oximetry and EKG  Epidural Block Procedure:  Approach:midline  Guidance:landmark technique and palpation technique  Location:L4-L5  Needle Type:Tuohy  Needle Gauge:17  Loss of Resistance Medium: saline  Loss of Resistance: 7cm  Cath Depth at skin:13 cm  Paresthesia: none  Aspiration:negative  Test Dose:negative  Number of Attempts: 1  Post Assessment:  Dressing:occlusive dressing applied and secured with tape  Pt Tolerance:patient tolerated the procedure well with no apparent complications  Complications:no

## 2020-01-02 NOTE — PROGRESS NOTES
The patient had spontaneous rupture membranes.  Fluid was clear.    Cervix is 2 cm, 50% and -2 station.  IUPC catheter is placed  Patient did have one variable deceleration.  Fetal heart tracing is now category 1.  Contractions are every 4 minutes.    Plan-continue Pitocin.  Vancomycin for GBS positive status.

## 2020-01-02 NOTE — PLAN OF CARE
Problem: Patient Care Overview  Goal: Plan of Care Review  Outcome: Ongoing (interventions implemented as appropriate)  Flowsheets (Taken 2020)  Progress: improving  Plan of Care Reviewed With: patient; spouse     Problem: Patient Care Overview  Goal: Individualization and Mutuality  Outcome: Ongoing (interventions implemented as appropriate)  Flowsheets (Taken 2020)  Patient Specific Goals (Include Timeframe): Breastfeeding within one hour of delivery  How to Address Anxieties/Fears: denies  Patient Specific Interventions: Cervidil  What Information Would Help Us Give You More Personalized Care?: denies  How Would You and/or Your Support Person Like to Participate in Your Care?: At bedside for plan of care and changes.  What Anxieties, Fears, Concerns, or Questions Do You Have About Your Care?: denies  Patient Specific Preferences: Nitrous, FORREST, breastfeeding, fob to cut cord     Problem: Fall Risk,  (Adult,Obstetrics,Pediatric)  Goal: Identify Related Risk Factors and Signs and Symptoms  Outcome: Ongoing (interventions implemented as appropriate)  Flowsheets (Taken 2020)  Related Risk Factors (Fall Risk, ): regional anesthesia  Signs and Symptoms (Fall Risk, ): presence of fall risk factors     Problem: Fall Risk,  (Adult,Obstetrics,Pediatric)  Goal: Absence of Maternal Fall  Outcome: Ongoing (interventions implemented as appropriate)  Flowsheets (Taken 2020)  Absence of Maternal Fall: achieves outcome     Problem: Fall Risk,  (Adult,Obstetrics,Pediatric)  Goal: Absence of  Fall/Drop  Outcome: Ongoing (interventions implemented as appropriate)  Flowsheets (Taken 2020)  Absence of  Fall/Drop: unable to achieve outcome     Problem: Labor (Cervical Ripen, Induct, Augment) (Adult,Obstetrics,Pediatric)  Goal: Signs and Symptoms of Listed Potential Problems Will be Absent, Minimized or Managed (Labor)  Outcome:  Ongoing (interventions implemented as appropriate)  Flowsheets (Taken 1/2/2020 7760)  Problems Assessed (Labor): all  Problems Present (Labor): none

## 2020-01-02 NOTE — H&P
[facility]  Obstetric History and Physical    Chief Complaint   Patient presents with   • Scheduled Induction     Term. Denies loss of fluid, vaginal bleeding, +FM       Subjective     Patient is a 29 y.o. female  currently at 40w1d, who presents for term induction of labor.  Patient had Cervidil placed at about 1:30 AM.  She is feeling some mild cramping.  No vaginal bleeding.  She reports good fetal movement.    Her prenatal care is complicated by Rh- status, elevated BMI and positive group B strep.  Patient's initial group B strep test came back positive but inadequate amounts of bacteria to do sensitivities.  Repeat group B strep came back negative.  Patient has a penicillin allergy.  Her previous obstetric/gynecological history is noted for Molar pregnancy.    The following portions of the patients history were reviewed and updated as appropriate: past medical history, past surgical history, past family history, past social history and problem list .       Prenatal Information:  Prenatal Results     POC Urine Glucose/Protein     Test Value Reference Range Date Time    Urine Glucose Negative mg/dL Negative, 1000 mg/dL (3+) 19 1102    Urine Protein Trace mg/dL Negative 19 1102          Initial Prenatal Labs     Test Value Reference Range Date Time    Hemoglobin 13.1 g/dL 11.1 - 15.9 19 1355    Hematocrit 39.2 % 34.0 - 46.6 19 1355    Platelets 221 10*3/mm3 140 - 450 20 0127      267 10*3/mm3 140 - 450 10/10/19 1233      347 x10E3/uL 150 - 450 19 1355    Rubella IgG 6.67 index Immune >0.99 19 1355    Hepatitis B SAg Negative  Negative 19 1355    Hepatitis C Ab <0.1 s/co ratio 0.0 - 0.9 19 1355    RPR Non Reactive  Non Reactive 19 1355    ABO A   20 0127    Rh Negative   20 0127    Antibody Screen Negative  Negative 19 1355    HIV Non Reactive  Non Reactive 19 1355    Urine Culture Final report   18 1540    Gonorrhea  Negative  Negative 06/06/19 1352    Chlamydia Negative  Negative 06/06/19 1352    TSH 2.660 mIU/mL 0.270 - 4.200 10/02/18 1640          2nd and 3rd Trimester     Test Value Reference Range Date Time    Hemoglobin (repeated) 11.7 g/dL 12.0 - 15.9 01/02/20 0127      12.4 g/dL 12.0 - 15.9 10/10/19 1233    Hematocrit (repeated) 34.7 % 34.0 - 46.6 01/02/20 0127      37.6 % 34.0 - 46.6 10/10/19 1233     mg/dL 65 - 179 10/10/19 1233    Antibody Screen (repeated) Negative   01/02/20 0127      Negative  Negative 10/10/19 1233    GTT Fasting        GTT 1 Hr        GTT 2 Hr        GTT 3 Hr        Group B Strep Negative  Negative 12/12/19 1352      Positive  Negative 12/05/19 1537          Drug Screening     Test Value Reference Range Date Time    Amphetamine Screen        Barbiturate Screen        Benzodiazepine Screen        Methadone Screen        Phencyclidine Screen        Opiates Screen        THC Screen        Cocaine Screen        Propoxyphene Screen        Buprenorphine Screen        Methamphetamine Screen        Oxycodone Screen        Tricyclic Antidepressants Screen              Other (Risk screening)     Test Value Reference Range Date Time    Varicella IgG POS H/O   06/06/19     Parvovirus IgG        CMV IgG        Cystic Fibrosis        Hemoglobin electrophoresis        NIPT        MSAFP-4        AFP (for NTD only) *Screen Negative*   07/12/19 1149              External Prenatal Results     Pregnancy Outside Results - Transcribed From Office Records - See Scanned Records For Details     Test Value Date Time    Hgb 11.7 g/dL 01/02/20 0127      12.4 g/dL 10/10/19 1233      13.1 g/dL 06/06/19 1355    Hct 34.7 % 01/02/20 0127      37.6 % 10/10/19 1233      39.2 % 06/06/19 1355    ABO A  01/02/20 0127    Rh Negative  01/02/20 0127    Antibody Screen Negative  01/02/20 0127      Negative  10/10/19 1233      Negative  06/06/19 1355    Glucose Fasting GTT       Glucose Tolerance Test 1 hour       Glucose Tolerance  Test 3 hour       Gonorrhea (discrete) Negative  19 1352    Chlamydia (discrete) Negative  19 1352    RPR Non Reactive  19 1355    VDRL       Syphilis Antibody       Rubella 6.67 index 19 1355    HBsAg Negative  19 1355    Herpes Simplex Virus PCR       Herpes Simplex VIrus Culture       HIV Non Reactive  19 1355    Hep C RNA Quant PCR       Hep C Antibody <0.1 s/co ratio 19 1355    AFP 22.9 ng/mL 19 1149    Group B Strep Negative  19 1352      Positive  19 1537    GBS Susceptibility to Clindamycin       GBS Susceptibility to Erythromycin       Fetal Fibronectin       Genetic Testing, Maternal Blood             Drug Screening     Test Value Date Time    Urine Drug Screen       Amphetamine Screen       Barbiturate Screen       Benzodiazepine Screen       Methadone Screen       Phencyclidine Screen       Opiates Screen       THC Screen       Cocaine Screen       Propoxyphene Screen       Buprenorphine Screen       Methamphetamine Screen       Oxycodone Screen       Tricyclic Antidepressants Screen                    Past OB History:     OB History    Para Term  AB Living   3 0 0 0 2 0   SAB TAB Ectopic Molar Multiple Live Births   1 0 0 1 0 0      # Outcome Date GA Lbr Nixon/2nd Weight Sex Delivery Anes PTL Lv   3 Current            2 2018           1 2018               Past Medical History: Past Medical History:   Diagnosis Date   • Chickenpox     as a child   • Dysmenorrhea    • Miscarriage    • Molar pregnancy       Past Surgical History Past Surgical History:   Procedure Laterality Date   • D&C WITH SUCTION N/A 2018    Procedure: DILATATION AND CURETTAGE WITH SUCTION;  Surgeon: Marcelo Valdez MD;  Location: [facility];  Service:    • WISDOM TOOTH EXTRACTION        Family History: Family History   Problem Relation Age of Onset   • No Known Problems Mother    • No Known Problems Father    • Cancer Maternal Grandfather    • Malig  Hyperthermia Neg Hx       Social History:  reports that she has never smoked. She has never used smokeless tobacco.   reports that she does not drink alcohol.   reports that she does not use drugs.        General ROS: Patient reports good fetal movement, no vaginal bleeding.  She reports positive mild cramping.  No shortness of breath or abdominal pain.    Objective       Vital Signs Range for the last 24 hours  Temperature: Temp:  [98.8 °F (37.1 °C)] 98.8 °F (37.1 °C)   Temp Source: Temp src: Oral   BP: BP: (114-147)/(65-85) 114/74   Pulse: Heart Rate:  [74-97] 87   Respirations: Resp:  [16] 16   SPO2: SpO2:  [97 %] 97 %   O2 Amount (l/min):     O2 Devices     Weight: Weight:  [122 kg (268 lb 9.6 oz)] 122 kg (268 lb 9.6 oz)     Physical Examination: General appearance - alert, well appearing, and in no distress  Mental status - normal mood, behavior, speech, dress, motor activity, and thought processes  Eyes - sclera anicteric  Abdomen -gravid, size greater than dates  Pelvic -exam was not repeated exam in the office was fingertip 40% and -3 station.  Extremities -nontender bilaterally    Presentation: vertex   Cervix: Exam by: Method: sterile exam per RN   Dilation: Cervical Dilation (cm): 0-1   Effacement: Cervical Effacement: 40%   Station: Fetal Station: 0--> -3       Fetal Heart Rate Assessment   Method: Fetal HR Assessment Method: external   Beats/min: Fetal HR (beats/min): 130   Baseline: Fetal Heart Baseline Rate: normal range   Variability: Fetal HR Variability: moderate (amplitude range 6 to 25 bpm)   Accels: Fetal HR Accelerations: greater than/equal to 15 bpm, lasting at least 15 seconds   Decels: Fetal HR Decelerations: absent   Tracing Category:       Uterine Assessment   Method: Method: external tocotransducer   Frequency (min): Contraction Frequency (Minutes): 3-5   Ctx Count in 10 min:     Duration:     Intensity: Contraction Intensity: no contractions   Intensity by IUPC:     Resting Tone: Uterine  Resting Tone: soft by palpation   Resting Tone by IUPC:     Emmanuel Units:       Laboratory Results:   Results from last 7 days   Lab Units 20  0127   WBC 10*3/mm3 12.71*   HEMOGLOBIN g/dL 11.7*   HEMATOCRIT % 34.7   PLATELETS 10*3/mm3 221           Assessment/Plan       Pregnancy    Rh negative status during pregnancy    BMI 37.0-37.9, adult    GBS (group B Streptococcus carrier), +RV culture, currently pregnant        Assessment:  1.  Intrauterine pregnancy at 40w1d gestation with reactive, reassuring fetal status.    2.  induction of labor  for term   with unfavorable cervix  3.  Obstetrical history significant for molar pregnancy..  4.  GBS status:   Strep Gp B MALGORZATA   Date Value Ref Range Status   2019 Negative Negative Final     Comment:     Centers for Disease Control and Prevention (CDC) and American Congress  of Obstetricians and Gynecologists (ACOG) guidelines for prevention of   group B streptococcal (GBS) disease specify co-collection of  a vaginal and rectal swab specimen to maximize sensitivity of GBS  detection. Per the CDC and ACOG, swabbing both the lower vagina and  rectum substantially increases the yield of detection compared with  sampling the vagina alone.  Penicillin G, ampicillin, or cefazolin are indicated for intrapartum  prophylaxis of  GBS colonization. Reflex susceptibility  testing should be performed prior to use of clindamycin only on GBS  isolates from penicillin-allergic women who are considered a high risk  for anaphylaxis. Treatment with vancomycin without additional testing  is warranted if resistance to clindamycin is noted.         Plan:  1. fetal and uterine monitoring  continuously and cervical ripening with  Cervidil  2. Plan of care has been reviewed with patient and family  3.  Risks, benefits of treatment plan have been discussed.  4.  All questions have been answered.        Marcelo Valdez MD  2020  8:35 AM

## 2020-01-03 LAB
ABO GROUP BLD: NORMAL
FETAL BLEED: NEGATIVE
NUMBER OF DOSES: NORMAL
RH BLD: NEGATIVE

## 2020-01-03 PROCEDURE — 25010000002 ROPIVACAINE PER 1 MG: Performed by: ANESTHESIOLOGY

## 2020-01-03 PROCEDURE — C1755 CATHETER, INTRASPINAL: HCPCS

## 2020-01-03 PROCEDURE — 25010000002 RHO D IMMUNE GLOBULIN 1500 UNIT/2ML SOLUTION PREFILLED SYRINGE: Performed by: OBSTETRICS & GYNECOLOGY

## 2020-01-03 PROCEDURE — 86900 BLOOD TYPING SEROLOGIC ABO: CPT | Performed by: OBSTETRICS & GYNECOLOGY

## 2020-01-03 PROCEDURE — 0UQGXZZ REPAIR VAGINA, EXTERNAL APPROACH: ICD-10-PCS | Performed by: OBSTETRICS & GYNECOLOGY

## 2020-01-03 PROCEDURE — 0HQ9XZZ REPAIR PERINEUM SKIN, EXTERNAL APPROACH: ICD-10-PCS | Performed by: OBSTETRICS & GYNECOLOGY

## 2020-01-03 PROCEDURE — 25010000002 FENTANYL CITRATE (PF) 2500 MCG/50ML SOLUTION: Performed by: ANESTHESIOLOGY

## 2020-01-03 PROCEDURE — 59400 OBSTETRICAL CARE: CPT | Performed by: OBSTETRICS & GYNECOLOGY

## 2020-01-03 PROCEDURE — 25010000002 VANCOMYCIN PER 500 MG: Performed by: OBSTETRICS & GYNECOLOGY

## 2020-01-03 PROCEDURE — 85461 HEMOGLOBIN FETAL: CPT | Performed by: OBSTETRICS & GYNECOLOGY

## 2020-01-03 PROCEDURE — 86901 BLOOD TYPING SEROLOGIC RH(D): CPT | Performed by: OBSTETRICS & GYNECOLOGY

## 2020-01-03 RX ORDER — OXYTOCIN-SODIUM CHLORIDE 0.9% IV SOLN 30 UNIT/500ML 30-0.9/5 UT/ML-%
999 SOLUTION INTRAVENOUS ONCE
Status: COMPLETED | OUTPATIENT
Start: 2020-01-03 | End: 2020-01-03

## 2020-01-03 RX ORDER — IBUPROFEN 600 MG/1
600 TABLET ORAL EVERY 8 HOURS PRN
Status: DISCONTINUED | OUTPATIENT
Start: 2020-01-03 | End: 2020-01-05 | Stop reason: HOSPADM

## 2020-01-03 RX ORDER — OXYCODONE HYDROCHLORIDE AND ACETAMINOPHEN 5; 325 MG/1; MG/1
2 TABLET ORAL EVERY 4 HOURS PRN
Status: DISCONTINUED | OUTPATIENT
Start: 2020-01-03 | End: 2020-01-05 | Stop reason: HOSPADM

## 2020-01-03 RX ORDER — OXYTOCIN-SODIUM CHLORIDE 0.9% IV SOLN 30 UNIT/500ML 30-0.9/5 UT/ML-%
250 SOLUTION INTRAVENOUS CONTINUOUS
Status: ACTIVE | OUTPATIENT
Start: 2020-01-03 | End: 2020-01-03

## 2020-01-03 RX ORDER — METHYLERGONOVINE MALEATE 0.2 MG/ML
200 INJECTION INTRAVENOUS ONCE AS NEEDED
Status: DISCONTINUED | OUTPATIENT
Start: 2020-01-03 | End: 2020-01-03 | Stop reason: HOSPADM

## 2020-01-03 RX ORDER — CARBOPROST TROMETHAMINE 250 UG/ML
250 INJECTION, SOLUTION INTRAMUSCULAR AS NEEDED
Status: DISCONTINUED | OUTPATIENT
Start: 2020-01-03 | End: 2020-01-03 | Stop reason: HOSPADM

## 2020-01-03 RX ORDER — OXYCODONE HYDROCHLORIDE AND ACETAMINOPHEN 5; 325 MG/1; MG/1
1 TABLET ORAL EVERY 4 HOURS PRN
Status: DISCONTINUED | OUTPATIENT
Start: 2020-01-03 | End: 2020-01-05 | Stop reason: HOSPADM

## 2020-01-03 RX ORDER — DOCUSATE SODIUM 100 MG/1
100 CAPSULE, LIQUID FILLED ORAL 2 TIMES DAILY
Status: DISCONTINUED | OUTPATIENT
Start: 2020-01-03 | End: 2020-01-05 | Stop reason: HOSPADM

## 2020-01-03 RX ORDER — OXYTOCIN-SODIUM CHLORIDE 0.9% IV SOLN 30 UNIT/500ML 30-0.9/5 UT/ML-%
125 SOLUTION INTRAVENOUS CONTINUOUS PRN
Status: COMPLETED | OUTPATIENT
Start: 2020-01-03 | End: 2020-01-03

## 2020-01-03 RX ORDER — ERYTHROMYCIN 5 MG/G
OINTMENT OPHTHALMIC
Status: DISPENSED
Start: 2020-01-03 | End: 2020-01-03

## 2020-01-03 RX ORDER — DIPHENHYDRAMINE HCL 25 MG
25 CAPSULE ORAL NIGHTLY PRN
Status: DISCONTINUED | OUTPATIENT
Start: 2020-01-03 | End: 2020-01-05 | Stop reason: HOSPADM

## 2020-01-03 RX ORDER — ONDANSETRON 4 MG/1
4 TABLET, FILM COATED ORAL EVERY 6 HOURS PRN
Status: DISCONTINUED | OUTPATIENT
Start: 2020-01-03 | End: 2020-01-03 | Stop reason: HOSPADM

## 2020-01-03 RX ORDER — MISOPROSTOL 200 UG/1
800 TABLET ORAL AS NEEDED
Status: DISCONTINUED | OUTPATIENT
Start: 2020-01-03 | End: 2020-01-03 | Stop reason: HOSPADM

## 2020-01-03 RX ORDER — OXYCODONE HYDROCHLORIDE AND ACETAMINOPHEN 5; 325 MG/1; MG/1
2 TABLET ORAL EVERY 4 HOURS PRN
Status: DISCONTINUED | OUTPATIENT
Start: 2020-01-03 | End: 2020-01-03 | Stop reason: HOSPADM

## 2020-01-03 RX ORDER — PHYTONADIONE 1 MG/.5ML
INJECTION, EMULSION INTRAMUSCULAR; INTRAVENOUS; SUBCUTANEOUS
Status: DISPENSED
Start: 2020-01-03 | End: 2020-01-03

## 2020-01-03 RX ORDER — ONDANSETRON 2 MG/ML
4 INJECTION INTRAMUSCULAR; INTRAVENOUS EVERY 6 HOURS PRN
Status: DISCONTINUED | OUTPATIENT
Start: 2020-01-03 | End: 2020-01-05 | Stop reason: HOSPADM

## 2020-01-03 RX ORDER — IBUPROFEN 600 MG/1
600 TABLET ORAL EVERY 6 HOURS PRN
Status: DISCONTINUED | OUTPATIENT
Start: 2020-01-03 | End: 2020-01-03 | Stop reason: HOSPADM

## 2020-01-03 RX ORDER — BISACODYL 10 MG
10 SUPPOSITORY, RECTAL RECTAL DAILY PRN
Status: DISCONTINUED | OUTPATIENT
Start: 2020-01-04 | End: 2020-01-05 | Stop reason: HOSPADM

## 2020-01-03 RX ORDER — ONDANSETRON 2 MG/ML
4 INJECTION INTRAMUSCULAR; INTRAVENOUS EVERY 6 HOURS PRN
Status: DISCONTINUED | OUTPATIENT
Start: 2020-01-03 | End: 2020-01-03 | Stop reason: HOSPADM

## 2020-01-03 RX ADMIN — ROPIVACAINE HYDROCHLORIDE 10 ML/HR: 2 INJECTION, SOLUTION EPIDURAL; INFILTRATION at 02:34

## 2020-01-03 RX ADMIN — VANCOMYCIN HYDROCHLORIDE 1 G: 1 INJECTION, SOLUTION INTRAVENOUS at 02:54

## 2020-01-03 RX ADMIN — OXYTOCIN 999 ML/HR: 10 INJECTION, SOLUTION INTRAMUSCULAR; INTRAVENOUS at 06:08

## 2020-01-03 RX ADMIN — SODIUM CHLORIDE, POTASSIUM CHLORIDE, SODIUM LACTATE AND CALCIUM CHLORIDE 125 ML/HR: 600; 310; 30; 20 INJECTION, SOLUTION INTRAVENOUS at 02:35

## 2020-01-03 RX ADMIN — IBUPROFEN 600 MG: 600 TABLET, FILM COATED ORAL at 11:01

## 2020-01-03 RX ADMIN — DOCUSATE SODIUM 100 MG: 100 CAPSULE, LIQUID FILLED ORAL at 11:01

## 2020-01-03 RX ADMIN — HUMAN RHO(D) IMMUNE GLOBULIN 1500 UNITS: 1500 SOLUTION INTRAMUSCULAR; INTRAVENOUS at 11:28

## 2020-01-03 RX ADMIN — OXYTOCIN 125 ML/HR: 10 INJECTION, SOLUTION INTRAMUSCULAR; INTRAVENOUS at 07:29

## 2020-01-03 RX ADMIN — Medication: at 11:29

## 2020-01-03 NOTE — PLAN OF CARE
Problem: Patient Care Overview  Goal: Plan of Care Review  Outcome: Ongoing (interventions implemented as appropriate)  Flowsheets  Taken 1/3/2020 0726 by Felicitas Walter RN  Progress: improving  Outcome Summary: Patient labored overnight and delivered vaginally. Patient has second degree laceration. Bleeding WNL at recovery. Transfer of care to mother baby when criteria met.  Taken 1/3/2020 0700 by Aliza Hsu, RN  Plan of Care Reviewed With: patient;spouse;family     Problem: Patient Care Overview  Goal: Discharge Needs Assessment  Outcome: Ongoing (interventions implemented as appropriate)  Flowsheets  Taken 1/2/2020 0045 by Bradly Hicks RN  Equipment Currently Used at Home: none  Taken 1/2/2020 0040 by Bradly Hicks RN  Transportation Anticipated: car, drives self  Patient/Family Anticipated Services at Transition: none  Patient/Family Anticipates Transition to: home with family  Taken 1/2/2020 1837 by Shannan Chavez RN  Readmission Within the Last 30 Days: no previous admission in last 30 days     Problem: Patient Care Overview  Goal: Interprofessional Rounds/Family Conf  Outcome: Ongoing (interventions implemented as appropriate)  Flowsheets (Taken 1/2/2020 1837 by Shannan Chavez RN)  Participants: patient;nursing;physician;pharmacy     Problem: Labor (Cervical Ripen, Induct, Augment) (Adult,Obstetrics,Pediatric)  Goal: Signs and Symptoms of Listed Potential Problems Will be Absent, Minimized or Managed (Labor)  Outcome: Outcome(s) achieved  Flowsheets (Taken 1/2/2020 0733 by Bradly Hicks RN)  Problems Assessed (Labor): all  Problems Present (Labor): none     Problem: Patient Care Overview  Goal: Individualization and Mutuality  Flowsheets  Taken 1/2/2020 0733 by Bradly Hicks RN  Patient Specific Goals (Include Timeframe): Breastfeeding within one hour of delivery  How to Address Anxieties/Fears: denies  What Information Would Help Us Give You More Personalized Care?:  denies  How Would You and/or Your Support Person Like to Participate in Your Care?: At bedside for plan of care and changes.  What Anxieties, Fears, Concerns, or Questions Do You Have About Your Care?: denies  Patient Specific Preferences: Nitrous, FORREST, breastfeeding, fob to cut cord  Taken 1/3/2020 0726 by Felicitas Walter, RN  Patient Specific Interventions: pitocin, frequent position changes, fob cut cord

## 2020-01-03 NOTE — PLAN OF CARE
Problem: Patient Care Overview  Goal: Plan of Care Review  Outcome: Ongoing (interventions implemented as appropriate)  Flowsheets (Taken 1/3/2020 5173)  Progress: improving  Plan of Care Reviewed With: patient; spouse  Outcome Summary: vaginal delivery, uncomplicated recovery, no c/o pain at this time

## 2020-01-03 NOTE — L&D DELIVERY NOTE
[facility]  Vaginal Delivery Note    Delivery     Delivery: Vaginal, Spontaneous     YOB: 2021    Time of Birth:  Gestational Age 6:03 AM   40w2d     Anesthesia: Epidural     Delivering clinician: Marcelo Valdez    Forceps?   No   Vacuum? No    Shoulder dystocia present: No        Delivery narrative: The patient is a 29-year-old  3 para 0-0-2-0 at 40-2/7 weeks who was admitted for term induction of labor.  Patient was admitted and Cervidil was placed at about 1:30 AM.  Cervidil was left in place for 12 hours.  With removal patient had changed her cervix to 2 cm.  Vancomycin was started for GBS prophylaxis.  The Pitocin infusion was started.  Patient had spontaneous rupture of membranes about 3 hours later.  IUPC catheter was placed.  Pitocin was continued and patient made steady labor progress.  She did receive an epidural.  Patient was complete at about 5 AM.  She pushed with excellent effort and brought the baby down to .  Patient was prepped and draped for delivery.  The fetal head delivered and nuchal cord x1 was easily reduced.  Nares and mouth were bulb suctioned.  The shoulders and body delivered without difficulty.  Baby was placed on mom's abdomen.  Baby was re-suctioned and noted to be vigorous.  After 30 seconds the cord was clamped and cut.  Cord blood was collected.  Baby was kept in Kangaroo care with the nurse in attendance.  Placenta delivered after about 5 to 10 minutes.  Placenta appeared complete.  Uterus felt firm at first but then became boggy.  Uterine exploration revealed some clots which were removed and massage was started uterus was then firm.  Second-degree perineal and vaginal laceration was repaired with 3-0 Vicryl in standard fashion.  The urethra was cleansed with Betadine and in and out catheter was used to drain the bladder.  Perineum was reinspected there was a small area of bleeding which was made hemostatic with a single suture of 4-0 Vicryl.  Fundus  was rechecked and noted to be firm.  On leaving the delivery room both mom and baby are stable.    Infant    Findings: male  infant     Infant observations: Weight: No birth weight on file.   Length:    in  Observations/Comments:  scale 4      Apgars: 8   @ 1 minute /    9   @ 5 minutes   Infant Name: Lit     Placenta, Cord, and Fluid    Placenta delivered  Spontaneous  at   1/3/2020  6:08 AM     Cord: 3 vessels  present.   Nuchal Cord?  yes; Number of nuchal loops present:  1    Cord blood obtained: Yes    Cord gases obtained:  No    Cord gas results: Venous:  No results found for: PHCVEN    Arterial:  No results found for: PHCART     Repair    Episiotomy: Not recorded     No    Lacerations: Yes  Laceration Information  Laceration Repaired?   Perineal: 2nd  Yes    Periurethral:         Labial:         Sulcus:         Vaginal:         Cervical:           Suture used for repair: 3-0 Vicryl   Estimated Blood Loss:             Complications  none    Disposition  Mother to Mother Baby/Postpartum  in stable condition currently.  Baby to remains with mom  in stable condition currently.      Marcelo Valdez MD  01/03/20  6:34 AM

## 2020-01-03 NOTE — LACTATION NOTE
P1. Baby is vigorous and roots aggressively . He latches to left nipple better than right as right nipple is wider and less elastic than left. Colostrum expresses easily . Mom has personal pump. Reviewed normal  feeding patterns and encouraged S2S.   Lactation Consult Note    Evaluation Completed: 1/3/2020 2:42 PM  Patient Name: Marisa Case  :  1990  MRN:  [id number]     REFERRAL  INFORMATION:                          Date of Referral: 20   Person Making Referral: patient  Maternal Reason for Referral: breastfeeding currently  Infant Reason for Referral: tight frenulum    DELIVERY HISTORY:          Skin to skin initiation date/time: 1/3/2020  6:03 AM   Skin to skin end date/time:              MATERNAL ASSESSMENT:  Breast Size Issue: none (20 143 : Mayra Farrell RN)  Breast Shape: pendulous (20 143Khoa : Mayra Farrell RN)  Breast Density: soft (20 143Khoa : Mayra Farrell RN)  Areola: Left:, elastic, Right:, dense (20 143 : Mayra Farrell RN)  Nipples: everted, graspable (20 1435 : Mayra Farrell RN)  Nipple Width: Right:, 1.5 cm (20 143Khoa : Mayra Farrell RN)     Right Nipple Symptoms: redness, tender (20 143Khoa : Mayra Farrell RN)       INFANT ASSESSMENT:  Information for the patient's :  Brittni Case [[id number]]   No past medical history on file.                                                                                                                                MATERNAL INFANT FEEDING:  Maternal Preparation: breast care (20 143Khoa : Mayra Farrell RN)  Maternal Emotional State: assist needed (20 143Khoa : Mayra Farrell RN)  Infant Positioning: clutch/football (20 143Khoa : Mayra Farrell RN)   Signs of Milk Transfer: audible swallow, infant jaw motion present, suck/swallow ratio (20 143Khoa : Klausing, Mayra M, RN)  Pain with Feeding: no (20  1435 : Mayra Farrell RN)           Milk Ejection Reflex: present (01/03/20 Candy : Mayra Farrell RN)           Latch Assistance: yes (01/03/20 Candy : Mayra Farrell RN)                               EQUIPMENT TYPE:  Breast Pump Type: double electric, personal (01/03/20 Candy : Mayra Farrell RN)  Breast Pump Flange Type: hard (01/03/20 Candy : Mayra Farrell RN)  Breast Pump Flange Size: 24 mm (01/03/20 Candy : Mayra Farrell RN)                        BREAST PUMPING:          LACTATION REFERRALS:

## 2020-01-04 LAB
BASOPHILS # BLD AUTO: 0.03 10*3/MM3 (ref 0–0.2)
BASOPHILS NFR BLD AUTO: 0.3 % (ref 0–1.5)
DEPRECATED RDW RBC AUTO: 42.5 FL (ref 37–54)
EOSINOPHIL # BLD AUTO: 0.13 10*3/MM3 (ref 0–0.4)
EOSINOPHIL NFR BLD AUTO: 1.2 % (ref 0.3–6.2)
ERYTHROCYTE [DISTWIDTH] IN BLOOD BY AUTOMATED COUNT: 12.9 % (ref 12.3–15.4)
HCT VFR BLD AUTO: 29.6 % (ref 34–46.6)
HGB BLD-MCNC: 10.1 G/DL (ref 12–15.9)
IMM GRANULOCYTES # BLD AUTO: 0.04 10*3/MM3 (ref 0–0.05)
IMM GRANULOCYTES NFR BLD AUTO: 0.4 % (ref 0–0.5)
LYMPHOCYTES # BLD AUTO: 2.18 10*3/MM3 (ref 0.7–3.1)
LYMPHOCYTES NFR BLD AUTO: 20.9 % (ref 19.6–45.3)
MCH RBC QN AUTO: 30.7 PG (ref 26.6–33)
MCHC RBC AUTO-ENTMCNC: 34.1 G/DL (ref 31.5–35.7)
MCV RBC AUTO: 90 FL (ref 79–97)
MONOCYTES # BLD AUTO: 0.62 10*3/MM3 (ref 0.1–0.9)
MONOCYTES NFR BLD AUTO: 6 % (ref 5–12)
NEUTROPHILS # BLD AUTO: 7.41 10*3/MM3 (ref 1.7–7)
NEUTROPHILS NFR BLD AUTO: 71.2 % (ref 42.7–76)
NRBC BLD AUTO-RTO: 0.1 /100 WBC (ref 0–0.2)
PLATELET # BLD AUTO: 172 10*3/MM3 (ref 140–450)
PMV BLD AUTO: 10 FL (ref 6–12)
RBC # BLD AUTO: 3.29 10*6/MM3 (ref 3.77–5.28)
WBC NRBC COR # BLD: 10.41 10*3/MM3 (ref 3.4–10.8)

## 2020-01-04 PROCEDURE — 85025 COMPLETE CBC W/AUTO DIFF WBC: CPT | Performed by: OBSTETRICS & GYNECOLOGY

## 2020-01-04 PROCEDURE — 25010000002 ENOXAPARIN PER 10 MG: Performed by: OBSTETRICS & GYNECOLOGY

## 2020-01-04 RX ADMIN — DOCUSATE SODIUM 100 MG: 100 CAPSULE, LIQUID FILLED ORAL at 21:29

## 2020-01-04 RX ADMIN — DOCUSATE SODIUM 100 MG: 100 CAPSULE, LIQUID FILLED ORAL at 08:09

## 2020-01-04 RX ADMIN — ENOXAPARIN SODIUM 40 MG: 40 INJECTION SUBCUTANEOUS at 06:32

## 2020-01-04 RX ADMIN — ENOXAPARIN SODIUM 40 MG: 40 INJECTION SUBCUTANEOUS at 21:29

## 2020-01-04 NOTE — PROGRESS NOTES
"[facility]  Vaginal Delivery Progress Note    Subjective   Postpartum Day 1: Vaginal Delivery    The patient feels well.  Her pain is well controlled   She is ambulating well.  Patient describes her bleeding as staining only.    Breastfeeding: infant latching.    ROS:  Pulm: negative for shortness of air  GI: negative for n/v  : negative for heavy bleeding    Objective     Vital Signs Range for the last 24 hours  Temperature: Temp:  [97.8 °F (36.6 °C)-98.5 °F (36.9 °C)] 98.5 °F (36.9 °C)   Temp Source: Temp src: Oral   BP: BP: (109-134)/(71-89) 109/71   Pulse: Heart Rate:  [88-89] 88   Respirations: Resp:  [16-18] 16   SPO2:     O2 Amount (l/min):     O2 Devices     Weight:       Admit Height:  Height: 172.7 cm (68\")      Physical Exam:  General:  no acute distress.  Abdomen: Fundus: appropriate, firm, non tender  Extremities: bilateral lower extremities with trace edema, no cords or tenderness       Lab results reviewed:    Lab Results   Component Value Date    WBC 10.41 01/04/2020    HGB 10.1 (L) 01/04/2020    HCT 29.6 (L) 01/04/2020    MCV 90.0 01/04/2020     01/04/2020     Rubella:     Rubella Antibodies, IgG   Date Value Ref Range Status   06/06/2019 6.67 Immune >0.99 index Final     Comment:                                     Non-immune       <0.90                                  Equivocal  0.90 - 0.99                                  Immune           >0.99       Rh Status:    RH type   Date Value Ref Range Status   01/03/2020 Negative  Final     Comment:     RhIG IS Indicated. Baby is Rh Positive     Rh Factor   Date Value Ref Range Status   06/06/2019 Negative  Final     Comment:     Please note: Prior records for this patient's ABO / Rh type are not  available for additional verification.       Immunizations:   Immunization History   Administered Date(s) Administered   • Rho (D) Immune Globulin 02/07/2018, 11/20/2018, 10/10/2019, 01/03/2020   • Tdap 10/10/2019   • flucelvax quad pfs =>4 YRS " 10/10/2019       Assessment/Plan       Pregnancy    Rh negative status during pregnancy    BMI 37.0-37.9, adult    GBS (group B Streptococcus carrier), +RV culture, currently pregnant      Marisa Case is Day 1  post-partum  Vaginal, Spontaneous : pt is doing well this am and denies any issues.     Rh negative: pt received rhogam postpartum    Patient and spouse request infant circumcision. Risks reviewed including bleeding, infection, damage to penis or need for revision. Also counseled patient and spouse regarding elective nature of procedure. They desire to proceed today.    Plan:  Continue current care.      Radha Nation MD  1/4/2020  12:54 PM

## 2020-01-04 NOTE — LACTATION NOTE
This note was copied from a baby's chart.  Breast feeding going well so far per Mom. She has a breast pump. Encouraged to call for any assistance. Many visitors bedside at present.

## 2020-01-05 VITALS
HEIGHT: 68 IN | TEMPERATURE: 97.9 F | OXYGEN SATURATION: 97 % | HEART RATE: 89 BPM | SYSTOLIC BLOOD PRESSURE: 143 MMHG | WEIGHT: 268.6 LBS | RESPIRATION RATE: 18 BRPM | DIASTOLIC BLOOD PRESSURE: 95 MMHG | BODY MASS INDEX: 40.71 KG/M2

## 2020-01-05 RX ORDER — IBUPROFEN 600 MG/1
600 TABLET ORAL EVERY 6 HOURS PRN
Qty: 30 TABLET | Refills: 1 | Status: SHIPPED | OUTPATIENT
Start: 2020-01-05 | End: 2020-02-11

## 2020-01-05 RX ADMIN — DOCUSATE SODIUM 100 MG: 100 CAPSULE, LIQUID FILLED ORAL at 09:08

## 2020-01-05 NOTE — PROGRESS NOTES
2020    Name:Marisa Case   MR#:[id number]    Vaginal Delivery Progress Note    HD#3    Subjective   Postpartum Day 2: 29 y.o. yo Female  delivered at 40w2d  delivered a male  infant.     The patient feels well.  Her pain is controlled.    She is ambulating well.  Patient describes her bleeding as thin lochia.    Breastfeeding: without difficulty.     Patient Active Problem List   Diagnosis   • Pregnancy   • Rh negative status during pregnancy   • BMI 37.0-37.9, adult   • GBS (group B Streptococcus carrier), +RV culture, currently pregnant       Objective   Vital Signs Range for the last 24 hours  Temp: Temp:  [97.9 °F (36.6 °C)-98 °F (36.7 °C)] 97.9 °F (36.6 °C) Temp src: Oral   BP: BP: (128-143)/(84-95) 143/95        Pulse: Heart Rate:  [86-92] 89  RR: Resp:  [16-18] 18  Weight: 122 kg (268 lb 9.6 oz)  BMI:  Body mass index is 40.84 kg/m².      Lab Results   Component Value Date    WBC 10.41 2020    HGB 10.1 (L) 2020    HCT 29.6 (L) 2020    MCV 90.0 2020     2020       Physical Exam  General:  no acute distresss.  Abdomen: Fundus: appropriate, firm, non tender Fundus: Firm with scant lochia  Extremities: no cyanosis, and no edema, no CT    Perineum:  Intact    Assessment/Plan   1.  PPD# 2      Plan:  Discharge home      Shannan Dewitt MD  2020 10:43 AM

## 2020-01-05 NOTE — PLAN OF CARE
Problem: Patient Care Overview  Goal: Plan of Care Review  Outcome: Outcome(s) achieved  Flowsheets  Taken 1/3/2020 0839 by Aliza Hsu, RN  Progress: improving  Taken 1/4/2020 2100 by Ricarda Lr, RN  Plan of Care Reviewed With: patient;spouse  Taken 1/5/2020 0732 by Barbi Cunha, RN  Outcome Summary: Pain well controlled, ambulating well, breastfeeding well, d/c today

## 2020-01-05 NOTE — LACTATION NOTE
Lactation Consult Note  P1 term baby latched easily to breast with swallows noted. He is sleepy,jaundice and encouraged keeping baby awake at breast so he will nurse well. Discussed ways to determine if baby is getting enough milk and given [facility] card. She has a breast pump at home.  Evaluation Completed: 2020 9:34 AM  Patient Name: Marisa Case  :  1990  MRN:  [id number]     REFERRAL  INFORMATION:                          Date of Referral: 20   Person Making Referral: nurse  Maternal Reason for Referral: breastfeeding currently  Infant Reason for Referral: tight frenulum    DELIVERY HISTORY:          Skin to skin initiation date/time: 1/3/2020  6:03 AM   Skin to skin end date/time:              MATERNAL ASSESSMENT:     Breast Shape: pendulous (20 : Shannan Gutierrez RN)  Breast Density: soft (20 : Shannan Gutierrez RN)  Areola: elastic (20 : Shannan Gutierrez RN)  Nipples: everted (20 : Shannan Gutierrez RN)                INFANT ASSESSMENT:  Information for the patient's :  Brittni Case [[id number]]   No past medical history on file.    Feeding Readiness Cues: eager (20 : Shannan Gutierrez RN)   Feeding Method: breastfeeding (20 : Shannan Gutierrez RN)   Feeding Tolerance/Success: arousal required, coordinated suck, coordinated swallow (20 : Shannan Gutierrez RN)                   Feeding Interventions: latch assistance provided (20 : Shannan Gutierrez RN)       Additional Documentation: LATCH Score (Group) (20 : Shannan Gutierrez RN)               Infant Positioning: clutch/football (20 : Shannan Gutierrez RN)           Effective Latch During Feeding: yes (20 : Shannan Gutierrez RN)   Suck/Swallow Coordination: present (20 : Shannan Gutierrez RN)   Signs of Milk Transfer: infant jaw motion present,  suck/swallow ratio (20 : Shannan Gutierrez RN)       Latch: 2-->grasps breast, tongue down, lips flanged, rhythmic sucking (20 : Shannan Gutierrez RN)   Audible Swallowin-->spontaneous and intermittent (24 hrs old) (20 : Shannan Gutierrez RN)   Type of Nipple: 2-->everted (after stimulation) (20 : Shannan Gutierrez RN)   Comfort (Breast/Nipple): 2-->soft/nontender (20 : Shannan Gutierrez RN)   Hold (Positioning): 1-->minimal assist, teach one side, mother does other, staff holds (20 : Shannan Gutierrez RN)   Latch Score: 9 (20 : Shannan Gutierrez RN)     Infant-Driven Feeding Scales - Readiness: Alert or fussy prior to care. Rooting and/or hands to mouth behavior. Good tone. (20 : Shannan Gutierrez RN)   Infant-Driven Feeding Scales - Quality: Nipples with a strong coordinated SSB but fatigues with progression. (20 : Shannan Gutierrez RN)             MATERNAL INFANT FEEDING:     Maternal Emotional State: assist needed (20 : Shannan Gutierrez RN)  Infant Positioning: clutch/football (20 : Shannan Gutierrez RN)   Signs of Milk Transfer: infant jaw motion present, suck/swallow ratio (20 : Shannan Gutierrez RN)  Pain with Feeding: no (20 : Shannan Gutierrez RN)           Milk Ejection Reflex: present (20 : Shannan Gutierrez RN)           Latch Assistance: yes (20 : Shannan Gutierrez RN)                               EQUIPMENT TYPE:  Breast Pump Type: double electric, personal, manual (20 09 : Shannan Gutierrez, RN)                              BREAST PUMPING:          LACTATION REFERRALS:  Lactation Referrals: outpatient lactation program (20 : Shannan Gutierrez, RN)

## 2020-02-11 ENCOUNTER — POSTPARTUM VISIT (OUTPATIENT)
Dept: OBSTETRICS AND GYNECOLOGY | Age: 30
End: 2020-02-11

## 2020-02-11 VITALS
SYSTOLIC BLOOD PRESSURE: 112 MMHG | HEIGHT: 68 IN | BODY MASS INDEX: 34.86 KG/M2 | WEIGHT: 230 LBS | DIASTOLIC BLOOD PRESSURE: 80 MMHG

## 2020-02-11 PROBLEM — O26.899 RH NEGATIVE STATUS DURING PREGNANCY: Status: RESOLVED | Noted: 2019-05-16 | Resolved: 2020-02-11

## 2020-02-11 PROBLEM — Z67.91 RH NEGATIVE STATUS DURING PREGNANCY: Status: RESOLVED | Noted: 2019-05-16 | Resolved: 2020-02-11

## 2020-02-11 PROBLEM — O99.820 GBS (GROUP B STREPTOCOCCUS CARRIER), +RV CULTURE, CURRENTLY PREGNANT: Status: RESOLVED | Noted: 2019-12-10 | Resolved: 2020-02-11

## 2020-02-11 PROBLEM — Z34.90 PREGNANCY: Status: RESOLVED | Noted: 2019-05-16 | Resolved: 2020-02-11

## 2020-02-11 PROCEDURE — 0503F POSTPARTUM CARE VISIT: CPT | Performed by: OBSTETRICS & GYNECOLOGY

## 2020-02-11 NOTE — PROGRESS NOTES
"Subjective   Marisa Case is a 29 y.o. female who presents for a postpartum visit. She is 6 weeks postpartum following a spontaneous vaginal delivery. I have fully reviewed the prenatal and intrapartum course. Postpartum course has been uneventful. Baby is feeding by breast. Bleeding has been normal in amount and decreasing. Bowel function is normal. Bladder function is normal. Patient not sexually active at this time. Contraception method is discussed. Postpartum depression screening: negative.    The following portions of the patient's history were reviewed and updated as appropriate: allergies, current medications,and problem list.    Review of Systems  Pertinent items are noted in HPI.    Objective   /80   Ht 172.7 cm (68\")   Wt 104 kg (230 lb)   LMP 03/27/2019   Breastfeeding Yes   BMI 34.97 kg/m²    General:  Alert and oriented, NAD    Breasts:         Heart:     Abdomen: Normal findings, nontender    Vulva: Normal, well-healed    Vagina: No lesions or abnormal discharge   Cervix:  Normal with no cervical motion tenderness   Corpus: Normal for post partum visit   Adnexa:  Non tender, non enlarged         Assessment/Plan     Normal postpartum exam. Pap smear done at today's visit.    1. Contraception: condom   2. Slow return to normal activities reviewed. Continue prenatal vitamins.  3. Follow up in 12 months or sooner as needed.           "

## 2020-02-13 LAB
CYTOLOGIST CVX/VAG CYTO: NORMAL
CYTOLOGY CVX/VAG DOC CYTO: NORMAL
CYTOLOGY CVX/VAG DOC THIN PREP: NORMAL
DX ICD CODE: NORMAL
HIV 1 & 2 AB SER-IMP: NORMAL
HPV I/H RISK 4 DNA CVX QL PROBE+SIG AMP: NEGATIVE
OTHER STN SPEC: NORMAL
STAT OF ADQ CVX/VAG CYTO-IMP: NORMAL

## 2020-03-24 ENCOUNTER — DOCUMENTATION (OUTPATIENT)
Dept: PHYSICAL THERAPY | Facility: CLINIC | Age: 30
End: 2020-03-24

## 2020-03-24 NOTE — PROGRESS NOTES
Discharge Summary  Discharge Summary from Physical Therapy Report      Date of Initial PT visit: 10/21/2019  Number of Visits Seen:  7    Goals: Partially Met   Plan Goals: STG: 3 weeks.   1.  Pt with be independent with HEP to improve ankle ROM and reduce pain. - met  2. Pt to improve his pain to <2/10 to be able to immediately stand from period of inactivity with less difficulty. - progressing   3. Pt to demonstrate full/WFL (L) ankle AROM as compared to his (R) to be able to normalize gait mechanics and perform work related activities without difficulty. - progressing    LT weeks  1. Pt to improve his gross (L) ankle/LE strength to 5/5 by discharge to improve (L) ankle stability and activity tolerance. - not met  2. Pt to be able to ambulate throughout full work day with min to no reports of pain/discomfort and stand when firt getting up in the morning without pain. - progressing   3. Pt to improve her FAAM outcome measure to >90% function. - NT  4. Pt to be able to return to all recreational activities, wearing any shoes would like, stand from sitting, and ambulate without difficulty. - not met    Discharge Plan: Continue with current home exercise program as instructed    Comments: Pt did not return after 7th visit.  D/C at this time.     Date of Discharge: 2020      Raya Lea, PT, DPT  Physical Therapist

## 2021-04-30 ENCOUNTER — OFFICE VISIT (OUTPATIENT)
Dept: OBSTETRICS AND GYNECOLOGY | Age: 31
End: 2021-04-30

## 2021-04-30 VITALS
SYSTOLIC BLOOD PRESSURE: 118 MMHG | HEIGHT: 68 IN | DIASTOLIC BLOOD PRESSURE: 74 MMHG | BODY MASS INDEX: 37.28 KG/M2 | WEIGHT: 246 LBS

## 2021-04-30 DIAGNOSIS — Z01.419 ENCOUNTER FOR GYNECOLOGICAL EXAMINATION WITHOUT ABNORMAL FINDING: Primary | ICD-10-CM

## 2021-04-30 DIAGNOSIS — Z31.69 ENCOUNTER FOR PRECONCEPTION CONSULTATION: ICD-10-CM

## 2021-04-30 PROCEDURE — 99395 PREV VISIT EST AGE 18-39: CPT | Performed by: OBSTETRICS & GYNECOLOGY

## 2021-04-30 NOTE — PROGRESS NOTES
"Subjective     Chief Complaint   Patient presents with   • Gynecologic Exam     AC       History of Present Illness    Marisa Case is a 30 y.o.  who presents for annual exam.  The patient is doing well.  Her son is now 16 months old.  Patient works as a .  She is considering pregnancy in the next 6 months or so.    Her menses are regular every 28-30 days, lasting 4-7 days, dysmenorrhea mild, occurring first 1-2 days of flow   Obstetric History:  OB History        3    Para   1    Term   1       0    AB   2    Living   1       SAB   1    TAB   0    Ectopic   0    Molar   1    Multiple   0    Live Births   1               Menstrual History:     Patient's last menstrual period was 2021.         Current contraception: condoms  History of abnormal Pap smear: no normal Pap and negative HPV in .  Received Gardasil immunization: yes  Perform regular self breast exam : no  Family history of uterine or ovarian cancer: no  Family History of colon cancer: no  Family history of breast cancer: no    Mammogram: not indicated.  Colonoscopy: not indicated.  DEXA: not indicated.    Exercise: walks daily   Calcium/Vitamin D: adequate intake and uses supplements    The following portions of the patient's history were reviewed and updated as appropriate: allergies, current medications, past family history, past medical history, past social history, past surgical history and problem list.    Review of Systems    Review of Systems   Constitutional: Negative for fatigue.   Respiratory: Negative for shortness of breath.    Gastrointestinal: Negative for abdominal pain.   Genitourinary: Negative for dysuria.   Neurological: Negative for headaches.   Psychiatric/Behavioral: Negative for dysphoric mood.     Objective   Physical Exam    /74   Ht 172.7 cm (68\")   Wt 112 kg (246 lb)   LMP 2021   Breastfeeding No   BMI 37.40 kg/m²     General:   alert, appears stated age and " cooperative   Neck: thyroid normal to palpation   Heart: regular rate and rhythm   Lungs: clear to auscultation bilaterally   Abdomen: soft, non-tender, without masses or organomegaly   Breast: inspection negative, no nipple discharge or bleeding, no masses or nodularity palpable   Vulva: normal, Bartholin's, Urethra, [place]'s normal   Vagina: normal mucosa, normal discharge   Cervix: no cervical motion tenderness and no lesions   Uterus: non-tender, normal shape and consistency   Adnexa: no mass, fullness, tenderness   Rectal: not indicated     Assessment/Plan   Diagnoses and all orders for this visit:    1. Encounter for gynecological examination without abnormal finding (Primary)    2. Encounter for preconception consultation  -     Rubella Antibody, IgG    We discussed pregnancy planning.  Patient does have a history of a molar pregnancy.  She will call with any positive pregnancy test.  She is taking prenatal vitamins and prior carrier testing has been negative.  Patient has previously had chickenpox.  We will check rubella IgG.    Pap smear is up-to-date.    All questions answered.  Breast self exam technique reviewed and patient encouraged to perform self-exam monthly.  Discussed healthy lifestyle modifications.  Recommended 30 minutes of aerobic exercise five times per week.  Discussed calcium needs to prevent osteoporosis.

## 2021-05-01 LAB — RUBV IGG SERPL IA-ACNC: 7.73 INDEX

## 2021-05-03 ENCOUNTER — TELEPHONE (OUTPATIENT)
Dept: OBSTETRICS AND GYNECOLOGY | Age: 31
End: 2021-05-03

## 2021-05-03 NOTE — TELEPHONE ENCOUNTER
----- Message from Marcelo Valdez MD sent at 5/2/2021  3:52 PM EDT -----  Please notify blood work is normal.

## 2021-10-08 ENCOUNTER — LAB (OUTPATIENT)
Dept: FAMILY MEDICINE CLINIC | Facility: CLINIC | Age: 31
End: 2021-10-08

## 2021-10-08 ENCOUNTER — OFFICE VISIT (OUTPATIENT)
Dept: FAMILY MEDICINE CLINIC | Facility: CLINIC | Age: 31
End: 2021-10-08

## 2021-10-08 VITALS
HEIGHT: 68 IN | TEMPERATURE: 98.6 F | HEART RATE: 86 BPM | BODY MASS INDEX: 37.44 KG/M2 | WEIGHT: 247 LBS | OXYGEN SATURATION: 98 % | DIASTOLIC BLOOD PRESSURE: 85 MMHG | SYSTOLIC BLOOD PRESSURE: 129 MMHG

## 2021-10-08 DIAGNOSIS — J01.90 ACUTE RHINOSINUSITIS: Primary | ICD-10-CM

## 2021-10-08 DIAGNOSIS — Z23 ENCOUNTER FOR IMMUNIZATION: ICD-10-CM

## 2021-10-08 DIAGNOSIS — Z00.00 ANNUAL PHYSICAL EXAM: ICD-10-CM

## 2021-10-08 DIAGNOSIS — E66.09 CLASS 2 OBESITY DUE TO EXCESS CALORIES WITHOUT SERIOUS COMORBIDITY WITH BODY MASS INDEX (BMI) OF 37.0 TO 37.9 IN ADULT: ICD-10-CM

## 2021-10-08 PROBLEM — E66.01 CLASS 2 SEVERE OBESITY DUE TO EXCESS CALORIES WITH SERIOUS COMORBIDITY AND BODY MASS INDEX (BMI) OF 37.0 TO 37.9 IN ADULT (HCC): Status: ACTIVE | Noted: 2019-07-12

## 2021-10-08 LAB
ALBUMIN SERPL-MCNC: 4.6 G/DL (ref 3.5–5.2)
ALBUMIN/GLOB SERPL: 1.4 G/DL
ALP SERPL-CCNC: 49 U/L (ref 39–117)
ALT SERPL W P-5'-P-CCNC: 23 U/L (ref 1–33)
ANION GAP SERPL CALCULATED.3IONS-SCNC: 10.4 MMOL/L (ref 5–15)
AST SERPL-CCNC: 18 U/L (ref 1–32)
BASOPHILS # BLD AUTO: 0.06 10*3/MM3 (ref 0–0.2)
BASOPHILS NFR BLD AUTO: 0.8 % (ref 0–1.5)
BILIRUB SERPL-MCNC: 0.3 MG/DL (ref 0–1.2)
BUN SERPL-MCNC: 7 MG/DL (ref 6–20)
BUN/CREAT SERPL: 12.1 (ref 7–25)
CALCIUM SPEC-SCNC: 9.4 MG/DL (ref 8.6–10.5)
CHLORIDE SERPL-SCNC: 105 MMOL/L (ref 98–107)
CHOLEST SERPL-MCNC: 149 MG/DL (ref 0–200)
CO2 SERPL-SCNC: 24.6 MMOL/L (ref 22–29)
CREAT SERPL-MCNC: 0.58 MG/DL (ref 0.57–1)
DEPRECATED RDW RBC AUTO: 41 FL (ref 37–54)
EOSINOPHIL # BLD AUTO: 0.07 10*3/MM3 (ref 0–0.4)
EOSINOPHIL NFR BLD AUTO: 0.9 % (ref 0.3–6.2)
ERYTHROCYTE [DISTWIDTH] IN BLOOD BY AUTOMATED COUNT: 12.5 % (ref 12.3–15.4)
GFR SERPL CREATININE-BSD FRML MDRD: 120 ML/MIN/1.73
GLOBULIN UR ELPH-MCNC: 3.2 GM/DL
GLUCOSE SERPL-MCNC: 81 MG/DL (ref 65–99)
HCT VFR BLD AUTO: 38.7 % (ref 34–46.6)
HCV AB SER DONR QL: NORMAL
HDLC SERPL-MCNC: 32 MG/DL (ref 40–60)
HGB BLD-MCNC: 12.5 G/DL (ref 12–15.9)
IMM GRANULOCYTES # BLD AUTO: 0.03 10*3/MM3 (ref 0–0.05)
IMM GRANULOCYTES NFR BLD AUTO: 0.4 % (ref 0–0.5)
LDLC SERPL CALC-MCNC: 96 MG/DL (ref 0–100)
LDLC/HDLC SERPL: 2.94 {RATIO}
LYMPHOCYTES # BLD AUTO: 1.8 10*3/MM3 (ref 0.7–3.1)
LYMPHOCYTES NFR BLD AUTO: 22.8 % (ref 19.6–45.3)
MCH RBC QN AUTO: 28.9 PG (ref 26.6–33)
MCHC RBC AUTO-ENTMCNC: 32.3 G/DL (ref 31.5–35.7)
MCV RBC AUTO: 89.6 FL (ref 79–97)
MONOCYTES # BLD AUTO: 0.37 10*3/MM3 (ref 0.1–0.9)
MONOCYTES NFR BLD AUTO: 4.7 % (ref 5–12)
NEUTROPHILS NFR BLD AUTO: 5.58 10*3/MM3 (ref 1.7–7)
NEUTROPHILS NFR BLD AUTO: 70.4 % (ref 42.7–76)
NRBC BLD AUTO-RTO: 0 /100 WBC (ref 0–0.2)
PLATELET # BLD AUTO: 334 10*3/MM3 (ref 140–450)
PMV BLD AUTO: 9.6 FL (ref 6–12)
POTASSIUM SERPL-SCNC: 4.3 MMOL/L (ref 3.5–5.2)
PROT SERPL-MCNC: 7.8 G/DL (ref 6–8.5)
RBC # BLD AUTO: 4.32 10*6/MM3 (ref 3.77–5.28)
SODIUM SERPL-SCNC: 140 MMOL/L (ref 136–145)
TRIGL SERPL-MCNC: 115 MG/DL (ref 0–150)
VLDLC SERPL-MCNC: 21 MG/DL (ref 5–40)
WBC # BLD AUTO: 7.91 10*3/MM3 (ref 3.4–10.8)

## 2021-10-08 PROCEDURE — 99395 PREV VISIT EST AGE 18-39: CPT | Performed by: FAMILY MEDICINE

## 2021-10-08 PROCEDURE — 85025 COMPLETE CBC W/AUTO DIFF WBC: CPT | Performed by: FAMILY MEDICINE

## 2021-10-08 PROCEDURE — 90471 IMMUNIZATION ADMIN: CPT | Performed by: FAMILY MEDICINE

## 2021-10-08 PROCEDURE — 80061 LIPID PANEL: CPT | Performed by: FAMILY MEDICINE

## 2021-10-08 PROCEDURE — 86803 HEPATITIS C AB TEST: CPT | Performed by: FAMILY MEDICINE

## 2021-10-08 PROCEDURE — 99212 OFFICE O/P EST SF 10 MIN: CPT | Performed by: FAMILY MEDICINE

## 2021-10-08 PROCEDURE — 36415 COLL VENOUS BLD VENIPUNCTURE: CPT | Performed by: FAMILY MEDICINE

## 2021-10-08 PROCEDURE — 90686 IIV4 VACC NO PRSV 0.5 ML IM: CPT | Performed by: FAMILY MEDICINE

## 2021-10-08 PROCEDURE — 80053 COMPREHEN METABOLIC PANEL: CPT | Performed by: FAMILY MEDICINE

## 2021-10-08 RX ORDER — CETIRIZINE HYDROCHLORIDE 10 MG/1
10 TABLET ORAL DAILY
COMMUNITY

## 2021-10-08 NOTE — PROGRESS NOTES
Assessment and Plan     Problem List Items Addressed This Visit        Endocrine and Metabolic    Class 2 obesity due to excess calories without serious comorbidity with body mass index (BMI) of 37.0 to 37.9 in adult    Overview     Discussed health risks associated with obesity.  Encouraged 150 minutes of exercise weekly.         Relevant Orders    CBC Auto Differential    Comprehensive Metabolic Panel    Lipid Panel      Other Visit Diagnoses     Acute rhinosinusitis    -  Primary    Symptoms likely related to viral illness.  Discussed supportive care including DayQuil and Flonase.  Patient to call office if symptoms persist for antibiotics.    Encounter for immunization        Relevant Orders    FluLaval/Fluarix >6 Months ([id number]) (Completed)    Annual physical exam        Relevant Orders    CBC Auto Differential    Comprehensive Metabolic Panel    Lipid Panel    Hepatitis C Antibody      Encouraged 150 minutes of moderate intensity activity weekly.   Encouraged regular dental visits.   Encouraged regular seat belt use.   Encouraged safe sex practices.   Patient provided with educational material regarding preventive health care in AVS.    Return in about 1 year (around 10/8/2022) for Annual Physical Exam.        Patient was given instructions and counseling regarding her condition or for health maintenance advice. Please see specific information pulled into the AVS if appropriate.        Marisa is a 31 y.o. being seen today for  Annual Exam (annual exam ) and Sinusitis (sinus pressure, cough from drainage)   Subjective   History of the Present Illness     Patient presents for an annual physical exam.    Diet: fast food 1-2x/week; eating fruits and vegetables; drinking water and coffee  Exercise: Mitzy Katz  Dentist: biannually for cleanings    Seatbelt Use: regular    Breast Cancer Screening: NA.   Cervical Cancer Screening: Up to date.   Colon Cancer Screening: NA.     Obese white female presents to  "establish care. Presents with complains of sinus congestion and pressure. Associated symptoms include cough. Of note, patient's son had RSV last week. Using DayQuil and Sudafed.     Normotensive in office.     Social History  She  reports that she has never smoked. She has never used smokeless tobacco. She reports that she does not drink alcohol and does not use drugs.  Objective   Vital Signs          Vitals:    10/08/21 1043   BP: 129/85   BP Location: Left arm   Patient Position: Sitting   Cuff Size: Large Adult   Pulse: 86   Temp: 98.6 °F (37 °C)   TempSrc: Infrared   SpO2: 98%   Weight: 112 kg (247 lb)   Height: 172.7 cm (68\")     BP Readings from Last 1 Encounters:   10/08/21 129/85     Wt Readings from Last 3 Encounters:   10/08/21 112 kg (247 lb)   04/30/21 112 kg (246 lb)   02/11/20 104 kg (230 lb)   Body mass index is 37.56 kg/m².     Physical Exam  Vitals reviewed.   Constitutional:       General: She is not in acute distress.     Appearance: She is well-developed. She is obese. She is not diaphoretic.   HENT:      Head: Normocephalic and atraumatic.      Right Ear: Tympanic membrane and ear canal normal.      Left Ear: Tympanic membrane and ear canal normal.      Nose:      Right Sinus: No maxillary sinus tenderness or frontal sinus tenderness.      Left Sinus: No maxillary sinus tenderness or frontal sinus tenderness.      Mouth/Throat:      Mouth: Mucous membranes are moist.      Pharynx: Oropharynx is clear.   Eyes:      Extraocular Movements: Extraocular movements intact.      Pupils: Pupils are equal, round, and reactive to light.   Cardiovascular:      Rate and Rhythm: Normal rate and regular rhythm.   Pulmonary:      Effort: Pulmonary effort is normal.      Breath sounds: Normal breath sounds.   Abdominal:      General: Bowel sounds are normal.      Palpations: Abdomen is soft.      Tenderness: There is no abdominal tenderness.   Musculoskeletal:      Right lower leg: No edema.      Left lower " leg: No edema.   Lymphadenopathy:      Cervical: No cervical adenopathy.   Skin:     General: Skin is warm and dry.   Neurological:      Mental Status: She is alert and oriented to person, place, and time.   Psychiatric:         Mood and Affect: Mood normal.         Behavior: Behavior normal.

## 2021-10-08 NOTE — PATIENT INSTRUCTIONS
Mediterranean Diet  A Mediterranean diet refers to food and lifestyle choices that are based on the traditions of countries located on the [place]. This way of eating has been shown to help prevent certain conditions and improve outcomes for people who have chronic diseases, like kidney disease and heart disease.  What are tips for following this plan?  Lifestyle  · Cook and eat meals together with your family, when possible.  · Drink enough fluid to keep your urine clear or pale yellow.  · Be physically active every day. This includes:  ? Aerobic exercise like running or swimming.  ? Leisure activities like gardening, walking, or housework.  · Get 7-8 hours of sleep each night.  · If recommended by your health care provider, drink red wine in moderation. This means 1 glass a day for nonpregnant women and 2 glasses a day for men. A glass of wine equals 5 oz (150 mL).  Reading food labels    · Check the serving size of packaged foods. For foods such as rice and pasta, the serving size refers to the amount of cooked product, not dry.  · Check the total fat in packaged foods. Avoid foods that have saturated fat or trans fats.  · Check the ingredients list for added sugars, such as corn syrup.    Shopping  · At the grocery store, buy most of your food from the areas near the walls of the store. This includes:  ? Fresh fruits and vegetables (produce).  ? Grains, beans, nuts, and seeds. Some of these may be available in unpackaged forms or large amounts (in bulk).  ? Fresh seafood.  ? Poultry and eggs.  ? Low-fat dairy products.  · Buy whole ingredients instead of prepackaged foods.  · Buy fresh fruits and vegetables in-season from local farmers markets.  · Buy frozen fruits and vegetables in resealable bags.  · If you do not have access to quality fresh seafood, buy precooked frozen shrimp or canned fish, such as tuna, salmon, or sardines.  · Buy small amounts of raw or cooked vegetables, salads, or olives from  the deli or salad bar at your store.  · Stock your pantry so you always have certain foods on hand, such as olive oil, canned tuna, canned tomatoes, rice, pasta, and beans.  Cooking  · Cook foods with extra-virgin olive oil instead of using butter or other vegetable oils.  · Have meat as a side dish, and have vegetables or grains as your main dish. This means having meat in small portions or adding small amounts of meat to foods like pasta or stew.  · Use beans or vegetables instead of meat in common dishes like chili or lasagna.  · [place] with different cooking methods. Try roasting or broiling vegetables instead of steaming or sautéeing them.  · Add frozen vegetables to soups, stews, pasta, or rice.  · Add nuts or seeds for added healthy fat at each meal. You can add these to yogurt, salads, or vegetable dishes.  · Marinate fish or vegetables using olive oil, lemon juice, garlic, and fresh herbs.  Meal planning    · Plan to eat 1 vegetarian meal one day each week. Try to work up to 2 vegetarian meals, if possible.  · Eat seafood 2 or more times a week.  · Have healthy snacks readily available, such as:  ? Vegetable sticks with hummus.  ? Greek yogurt.  ? Fruit and nut trail mix.  · Eat balanced meals throughout the week. This includes:  ? Fruit: 2-3 servings a day  ? Vegetables: 4-5 servings a day  ? Low-fat dairy: 2 servings a day  ? Fish, poultry, or lean meat: 1 serving a day  ? Beans and legumes: 2 or more servings a week  ? Nuts and seeds: 1-2 servings a day  ? Whole grains: 6-8 servings a day  ? Extra-virgin olive oil: 3-4 servings a day  · Limit red meat and sweets to only a few servings a month    What are my food choices?  · Mediterranean diet  ? Recommended  § Grains: Whole-grain pasta. Brown rice. Bulgar wheat. Polenta. Couscous. Whole-wheat bread. Oatmeal. Quinoa.  § Vegetables: Artichokes. Beets. Broccoli. Cabbage. Carrots. Eggplant. Green beans. Chard. Kale. Spinach. Onions. Leeks. Peas. Squash.  Tomatoes. Peppers. Radishes.  § Fruits: Apples. Apricots. Avocado. Berries. Bananas. Cherries. Dates. Figs. Grapes. Zaida. Melon. Oranges. Peaches. Plums. Pomegranate.  § Meats and other protein foods: Beans. Almonds. Sunflower seeds. Pine nuts. Peanuts. Cod. [place]. Scallops. Shrimp. Tuna. Tilapia. Clams. Oysters. Eggs.  § Dairy: Low-fat milk. Cheese. Greek yogurt.  § Beverages: Water. Red wine. Herbal tea.  § Fats and oils: Extra virgin olive oil. Avocado oil. Grape seed oil.  § Sweets and desserts: Greek yogurt with honey. Baked apples. Poached pears. Trail mix.  § Seasoning and other foods: Basil. Cilantro. Coriander. Cumin. Mint. Parsley. Luke. Rosemary. Tarragon. Garlic. Oregano. Thyme. Pepper. Balsalmic vinegar. Tahini. Hummus. Tomato sauce. Olives. Mushrooms.  ? Limit these  § Grains: Prepackaged pasta or rice dishes. Prepackaged cereal with added sugar.  § Vegetables: Deep fried potatoes (french fries).  § Fruits: Fruit canned in syrup.  § Meats and other protein foods: Beef. Pork. Lamb. Poultry with skin. Hot dogs. Kerr.  § Dairy: Ice cream. Sour cream. Whole milk.  § Beverages: Juice. Sugar-sweetened soft drinks. Beer. Liquor and spirits.  § Fats and oils: Butter. Canola oil. Vegetable oil. Beef fat (tallow). Lard.  § Sweets and desserts: Cookies. Cakes. Pies. Candy.  § Seasoning and other foods: Mayonnaise. Premade sauces and marinades.  The items listed may not be a complete list. Talk with your dietitian about what dietary choices are right for you.  Summary  · The Mediterranean diet includes both food and lifestyle choices.  · Eat a variety of fresh fruits and vegetables, beans, nuts, seeds, and whole grains.  · Limit the amount of red meat and sweets that you eat.  · Talk with your health care provider about whether it is safe for you to drink red wine in moderation. This means 1 glass a day for nonpregnant women and 2 glasses a day for men. A glass of wine equals 5 oz (150 mL).  This information  is not intended to replace advice given to you by your health care provider. Make sure you discuss any questions you have with your health care provider.  Document Revised: 08/17/2017 Document Reviewed: 08/10/2017  [employer] Patient Education © 2020 [employer]      Exercising to Stay Healthy  To become healthy and stay healthy, it is recommended that you do moderate-intensity and vigorous-intensity exercise. You can tell that you are exercising at a moderate intensity if your heart starts beating faster and you start breathing faster but can still hold a conversation. You can tell that you are exercising at a vigorous intensity if you are breathing much harder and faster and cannot hold a conversation while exercising.  Exercising regularly is important. It has many health benefits, such as:  · Improving overall fitness, flexibility, and endurance.  · Increasing bone density.  · Helping with weight control.  · Decreasing body fat.  · Increasing muscle strength.  · Reducing stress and tension.  · Improving overall health.  How often should I exercise?  Choose an activity that you enjoy, and set realistic goals. Your health care provider can help you make an activity plan that works for you.  Exercise regularly as told by your health care provider. This may include:  · Doing strength training two times a week, such as:  ? Lifting weights.  ? Using resistance bands.  ? Push-ups.  ? Sit-ups.  ? Yoga.  · Doing a certain intensity of exercise for a given amount of time. Choose from these options:  ? A total of 150 minutes of moderate-intensity exercise every week.  ? A total of 75 minutes of vigorous-intensity exercise every week.  ? A mix of moderate-intensity and vigorous-intensity exercise every week.  Children, pregnant women, people who have not exercised regularly, people who are overweight, and older adults may need to talk with a health care provider about what activities are safe to do. If you have a medical  condition, be sure to talk with your health care provider before you start a new exercise program.  What are some exercise ideas?  Moderate-intensity exercise ideas include:  · Walking 1 mile (1.6 km) in about 15 minutes.  · Biking.  · Hiking.  · Golfing.  · Dancing.  · Water aerobics.  Vigorous-intensity exercise ideas include:  · Walking 4.5 miles (7.2 km) or more in about 1 hour.  · Jogging or running 5 miles (8 km) in about 1 hour.  · Biking 10 miles (16.1 km) or more in about 1 hour.  · Lap swimming.  · Roller-skating or in-line skating.  · Cross-country skiing.  · Vigorous competitive sports, such as football, basketball, and soccer.  · Jumping rope.  · Aerobic dancing.  What are some everyday activities that can help me to get exercise?  · Yard work, such as:  ? Pushing a .  ? Raking and bagging leaves.  · Washing your car.  · Pushing a stroller.  · Shoveling snow.  · Gardening.  · Washing windows or floors.  How can I be more active in my day-to-day activities?  · Use stairs instead of an elevator.  · Take a walk during your lunch break.  · If you drive, park your car farther away from your work or school.  · If you take public transportation, get off one stop early and walk the rest of the way.  · Stand up or walk around during all of your indoor phone calls.  · Get up, stretch, and walk around every 30 minutes throughout the day.  · Enjoy exercise with a friend. Support to continue exercising will help you keep a regular routine of activity.  What guidelines can I follow while exercising?  · Before you start a new exercise program, talk with your health care provider.  · Do not exercise so much that you hurt yourself, feel dizzy, or get very short of breath.  · Wear comfortable clothes and wear shoes with good support.  · Drink plenty of water while you exercise to prevent dehydration or heat stroke.  · Work out until your breathing and your heartbeat get faster.  Where to find more  information  · [place] Department of Health and Human Services: www.hhs.gov  · Centers for Disease Control and Prevention (CDC): www.cdc.gov  Summary  · Exercising regularly is important. It will improve your overall fitness, flexibility, and endurance.  · Regular exercise also will improve your overall health. It can help you control your weight, reduce stress, and improve your bone density.  · Do not exercise so much that you hurt yourself, feel dizzy, or get very short of breath.  · Before you start a new exercise program, talk with your health care provider.  This information is not intended to replace advice given to you by your health care provider. Make sure you discuss any questions you have with your health care provider.  Document Revised: 11/30/2018 Document Reviewed: 11/08/2018  [employer] Patient Education © 2021 [employer]       ADVANCE CARE PLANNING  Conversations that Matter  PLANNING GUIDE    LOOKING BACK ...  Who we are, what we believe, and what we value are all shaped by experiences we have had. Latter day, family traditions, jobs and friends affect us deeply.    Has anything happened in your past that shaped your feelings about medical treatment?    Think about an experience you may have had with a family member or friend who was faced with a decision about medical care near the end of life. What was positive about that experience? What do you wish would have been done differently?    HERE AND NOW ...  Do you have any significant health problems now? What kinds of things bring you such scooby that, should a health problem prevent you from doing them any more, life would have little meaning? What short- or long-term goals do you have? How might medical treatment help you or hinder you in accomplishing those goals?    WHAT ABOUT TOMORROW?  What significant health problems do you fear may affect you in the future? How do you feel about the possibility of having to go to a nursing home? How would decisions  "be made if you could not make them?    WHO SHOULD MAKE DECISIONS?  An important part of planning is to consider whether or not you could appoint someone to make your healthcare decisions if you could not make them yourself. Many people select a close family member, but you are free to pick anyone you think could best represent you. Whoever you appoint should have all of the following qualifications:    • Can be trusted.  • Is willing to accept this responsibility.  • Is willing to follow the values and instructions you have discussed.  • Is able to make complex, difficult decisions.    It is helpful, but not required, to appoint one or more alternate persons in case your first choice becomes unable or unwilling to represent you. It is best if only one person has authority at a time, but you can instruct your representatives to discuss decisions together if time permits.    WHAT FUTURE DECISIONS NEED TO BE CONSIDERED?  Providing instructions for future healthcare decisions may seem like an impossible task. How can anyone plan for all the possibilities? You cannot … but you do not have to.    You need to plan for situations where you:  1. Become unexpectedly incapable of making your own decisions  2. It is clear you will have little or no recovery, and  3. The injury or loss of function is significant.    Such a situation might arise because of an injury to the brain from an accident, a stroke, or a slowly progressive disease such as Alzheimer's.    To plan for this type of situation, many people state, \"If I'm going to be a vegetable, let me go,\" or \"No heroics,\" or \"Don't keep me alive on machines.\" While these remarks are a beginning, they simply are too vague to guide decision-making.    You need to completely describe under what circumstances your goals for medical care should be changed from attempting to prolong life to being allowed to die. In some situations, certain treatments may not make sense because they " will not help, but other treatments will be of important benefit.    Consider these three questions:  1. When would it make sense to continue certain treatments in an effort to prolong life and seek recovery?  2. When would it make sense to stop or withhold certain treatments and accept death when it comes?  3. Under any circumstance, what kind of comfort care would you want, including medication, spiritual and environmental options?    Making these choices requires understanding the information, weighing the benefits and burdens from your perspective, and then discussing your choices with those closest to you.    WHAT'S NEXT?  How do you make sure that your choices are respected? First talk, about them with your family, friends, clergy and physician, then put your choices in writing. Information about putting your plans into writing -- in an advance directive -- is available from your healthcare organization or .    Do you have any significant health problems? What health problems do you fear in the future?     Consider what frightens you most about medical treatment. What role does Protestant, nidhi or spirituality play in how you live your life? How does cost influence your decisions about medical care?   In terms of future medical care, under what circumstances would you want the goals of medical treatment to switch from attempting to prolong life to focusing on comfort?     Describe these circumstances in as much detail as possible.   Ask yourself, what will most help me live well at this point in my life?     Share your views with the person or people who would make your medical decisions if you could not make them.     THERE'S NO EASY WAY TO PLAN FOR FUTURE HEALTHCARE CHOICES.  It's a process that involves thinking and talking about complex and sensitive issues.    The questions that follow will help in the advance care planning process. This is a guide for your own benefit; it's not a test, and there  are no right or wrong answers. It does not need to be completed all at once. You may use it to share your feelings with healthcare providers, your family and your friends. The answers to these questions will help those you love make choices for you when you cannot make them yourself.    These are things I need to tell my loved ones:  What is your idea of comfort care? Describe how you would want medications to be used to provide comfort. What type of spiritual care would you want?     I need to learn about: ____________________________  I need to ask my healthcare provider: ________________

## 2021-12-06 ENCOUNTER — TELEPHONE (OUTPATIENT)
Dept: OBSTETRICS AND GYNECOLOGY | Age: 31
End: 2021-12-06

## 2021-12-06 NOTE — TELEPHONE ENCOUNTER
Please have patient come in for beta-hCG level today and Wednesday.  We will call patient on Thursday.

## 2021-12-06 NOTE — TELEPHONE ENCOUNTER
Patient says she was told to call when she has a + pregnancy test, so she can have blood work.  LMP 11/11/21 and + HPT.  Please advise.

## 2021-12-16 ENCOUNTER — OFFICE VISIT (OUTPATIENT)
Dept: OBSTETRICS AND GYNECOLOGY | Age: 31
End: 2021-12-16

## 2021-12-16 VITALS
HEIGHT: 68 IN | DIASTOLIC BLOOD PRESSURE: 70 MMHG | WEIGHT: 250 LBS | SYSTOLIC BLOOD PRESSURE: 118 MMHG | BODY MASS INDEX: 37.89 KG/M2

## 2021-12-16 DIAGNOSIS — O20.0 THREATENED ABORTION: Primary | ICD-10-CM

## 2021-12-16 PROCEDURE — 99212 OFFICE O/P EST SF 10 MIN: CPT | Performed by: OBSTETRICS & GYNECOLOGY

## 2021-12-16 NOTE — PROGRESS NOTES
"  Chief complaint-early pregnancy with history of molar pregnancy    History of present illness- Patient is a 31 y.o.  who was told to come in early due to history of molar pregnancy.  Patient is not having any vaginal bleeding or pelvic pain.  She has been taking prenatal vitamins.      /70   Ht 172.7 cm (68\")   Wt 113 kg (250 lb)   LMP 2021   Breastfeeding No   BMI 38.01 kg/m²   OBGyn Exam    Pelvic ultrasound shows a gestational sac measuring 5 weeks 5 days.  Patient is at 5 weeks 0 days.  There is no yolk sac or fetal pole noted.  There is a corpus luteum on the left and no free fluid in the pelvis.    Diagnoses and all orders for this visit:    1. Threatened  (Primary)    History of molar pregnancy-plan to repeat ultrasound in about 1 week.  Patient will report any bleeding or problems.  Continue prenatal vitamins.  "

## 2021-12-27 ENCOUNTER — OFFICE VISIT (OUTPATIENT)
Dept: OBSTETRICS AND GYNECOLOGY | Age: 31
End: 2021-12-27

## 2021-12-27 VITALS
DIASTOLIC BLOOD PRESSURE: 74 MMHG | HEIGHT: 68 IN | SYSTOLIC BLOOD PRESSURE: 118 MMHG | BODY MASS INDEX: 38.04 KG/M2 | WEIGHT: 251 LBS

## 2021-12-27 DIAGNOSIS — Z87.59 HISTORY OF MOLAR PREGNANCY: Primary | ICD-10-CM

## 2021-12-27 DIAGNOSIS — E66.09 CLASS 2 OBESITY DUE TO EXCESS CALORIES WITHOUT SERIOUS COMORBIDITY WITH BODY MASS INDEX (BMI) OF 37.0 TO 37.9 IN ADULT: ICD-10-CM

## 2021-12-27 PROCEDURE — 99213 OFFICE O/P EST LOW 20 MIN: CPT | Performed by: OBSTETRICS & GYNECOLOGY

## 2021-12-27 RX ORDER — ASPIRIN 81 MG/1
81 TABLET ORAL DAILY
Qty: 90 TABLET | Refills: 3 | Status: SHIPPED | OUTPATIENT
Start: 2021-12-27 | End: 2022-01-22 | Stop reason: HOSPADM

## 2021-12-27 NOTE — PROGRESS NOTES
"  Chief complaint-history of molar pregnancy with gestational sac seen only on prior ultrasound    History of present illness- Patient is a 31 y.o.  who has not had any vaginal bleeding or pelvic pain.  Her ultrasound 1 week ago showed just vaginal sac only.        /74   Ht 172.7 cm (68\")   Wt 114 kg (251 lb)   LMP 2021   Breastfeeding No   BMI 38.16 kg/m²   Physical Exam  Constitutional:       Appearance: Normal appearance.   Neurological:      Mental Status: She is alert.         Pelvic ultrasound shows intrauterine pregnancy measuring 6 weeks 5 days with a due date of 2022.  EDC by LMP is consistent with ultrasound.    Diagnoses and all orders for this visit:    1. History of molar pregnancy (Primary)    Molar pregnancy is ruled out with ultrasound showing viable intrauterine pregnancy.  Patient has a history of miscarriage so we will start low-dose aspirin.    Follow-up in 2 weeks for new OB visit with viability ultrasound.  "

## 2022-01-10 ENCOUNTER — TELEPHONE (OUTPATIENT)
Dept: OBSTETRICS AND GYNECOLOGY | Age: 32
End: 2022-01-10

## 2022-01-10 ENCOUNTER — TRANSCRIBE ORDERS (OUTPATIENT)
Dept: ADMINISTRATIVE | Facility: HOSPITAL | Age: 32
End: 2022-01-10

## 2022-01-10 ENCOUNTER — LAB (OUTPATIENT)
Dept: LAB | Facility: HOSPITAL | Age: 32
End: 2022-01-10

## 2022-01-10 DIAGNOSIS — Z11.52 ENCOUNTER FOR SCREENING FOR SEVERE ACUTE RESPIRATORY SYNDROME CORONAVIRUS 2 (SARS-COV-2) INFECTION: Primary | ICD-10-CM

## 2022-01-10 DIAGNOSIS — Z11.52 ENCOUNTER FOR SCREENING FOR SEVERE ACUTE RESPIRATORY SYNDROME CORONAVIRUS 2 (SARS-COV-2) INFECTION: ICD-10-CM

## 2022-01-10 PROCEDURE — C9803 HOPD COVID-19 SPEC COLLECT: HCPCS

## 2022-01-10 PROCEDURE — U0004 COV-19 TEST NON-CDC HGH THRU: HCPCS

## 2022-01-10 NOTE — TELEPHONE ENCOUNTER
Patient was exposed to covid-19 a week ago.  She is currently experiencing congestion and running nose.  Wants to know if she needs to reschedule her appt. Please advise. [phone].

## 2022-01-11 LAB — SARS-COV-2 ORF1AB RESP QL NAA+PROBE: DETECTED

## 2022-01-15 ENCOUNTER — TELEPHONE (OUTPATIENT)
Dept: OBSTETRICS AND GYNECOLOGY | Age: 32
End: 2022-01-15

## 2022-01-15 NOTE — TELEPHONE ENCOUNTER
Pt called ER line  KUNAL, also has covid 10 days, feeling ok  pantiliner but not soaking, no pain  Recommend observation, to ER for heavy  Bleeding  Call office Monday for US evaluation.   SAB warnings

## 2022-01-17 ENCOUNTER — HOSPITAL ENCOUNTER (EMERGENCY)
Facility: HOSPITAL | Age: 32
Discharge: HOME | End: 2022-01-17
Attending: EMERGENCY MEDICINE | Admitting: EMERGENCY MEDICINE

## 2022-01-17 ENCOUNTER — APPOINTMENT (OUTPATIENT)
Dept: ULTRASOUND IMAGING | Facility: HOSPITAL | Age: 32
End: 2022-01-17

## 2022-01-17 VITALS
HEART RATE: 97 BPM | RESPIRATION RATE: 18 BRPM | DIASTOLIC BLOOD PRESSURE: 87 MMHG | BODY MASS INDEX: 37.89 KG/M2 | TEMPERATURE: 98.7 F | SYSTOLIC BLOOD PRESSURE: 123 MMHG | OXYGEN SATURATION: 93 % | HEIGHT: 68 IN | WEIGHT: 250 LBS

## 2022-01-17 DIAGNOSIS — O03.4 INCOMPLETE ABORTION: Primary | ICD-10-CM

## 2022-01-17 LAB
ABO GROUP BLD: NORMAL
ALBUMIN SERPL-MCNC: 4.2 G/DL (ref 3.5–5.2)
ALBUMIN/GLOB SERPL: 1.4 G/DL
ALP SERPL-CCNC: 53 U/L (ref 39–117)
ALT SERPL W P-5'-P-CCNC: 49 U/L (ref 1–33)
ANION GAP SERPL CALCULATED.3IONS-SCNC: 12.1 MMOL/L (ref 5–15)
AST SERPL-CCNC: 19 U/L (ref 1–32)
BASOPHILS # BLD AUTO: 0.05 10*3/MM3 (ref 0–0.2)
BASOPHILS NFR BLD AUTO: 0.6 % (ref 0–1.5)
BILIRUB SERPL-MCNC: 0.2 MG/DL (ref 0–1.2)
BLD GP AB SCN SERPL QL: NEGATIVE
BUN SERPL-MCNC: 6 MG/DL (ref 6–20)
BUN/CREAT SERPL: 10.2 (ref 7–25)
CALCIUM SPEC-SCNC: 9 MG/DL (ref 8.6–10.5)
CHLORIDE SERPL-SCNC: 104 MMOL/L (ref 98–107)
CO2 SERPL-SCNC: 21.9 MMOL/L (ref 22–29)
CREAT SERPL-MCNC: 0.59 MG/DL (ref 0.57–1)
DEPRECATED RDW RBC AUTO: 42.6 FL (ref 37–54)
EOSINOPHIL # BLD AUTO: 0.11 10*3/MM3 (ref 0–0.4)
EOSINOPHIL NFR BLD AUTO: 1.4 % (ref 0.3–6.2)
ERYTHROCYTE [DISTWIDTH] IN BLOOD BY AUTOMATED COUNT: 13.1 % (ref 12.3–15.4)
GFR SERPL CREATININE-BSD FRML MDRD: 118 ML/MIN/1.73
GLOBULIN UR ELPH-MCNC: 2.9 GM/DL
GLUCOSE SERPL-MCNC: 101 MG/DL (ref 65–99)
HCG INTACT+B SERPL-ACNC: NORMAL MIU/ML
HCT VFR BLD AUTO: 39.9 % (ref 34–46.6)
HGB BLD-MCNC: 13.1 G/DL (ref 12–15.9)
IMM GRANULOCYTES # BLD AUTO: 0.04 10*3/MM3 (ref 0–0.05)
IMM GRANULOCYTES NFR BLD AUTO: 0.5 % (ref 0–0.5)
LYMPHOCYTES # BLD AUTO: 2.23 10*3/MM3 (ref 0.7–3.1)
LYMPHOCYTES NFR BLD AUTO: 28.1 % (ref 19.6–45.3)
MCH RBC QN AUTO: 29.4 PG (ref 26.6–33)
MCHC RBC AUTO-ENTMCNC: 32.8 G/DL (ref 31.5–35.7)
MCV RBC AUTO: 89.5 FL (ref 79–97)
MONOCYTES # BLD AUTO: 0.55 10*3/MM3 (ref 0.1–0.9)
MONOCYTES NFR BLD AUTO: 6.9 % (ref 5–12)
NEUTROPHILS NFR BLD AUTO: 4.97 10*3/MM3 (ref 1.7–7)
NEUTROPHILS NFR BLD AUTO: 62.5 % (ref 42.7–76)
NRBC BLD AUTO-RTO: 0 /100 WBC (ref 0–0.2)
PLATELET # BLD AUTO: 316 10*3/MM3 (ref 140–450)
PMV BLD AUTO: 9.5 FL (ref 6–12)
POTASSIUM SERPL-SCNC: 3.7 MMOL/L (ref 3.5–5.2)
PROT SERPL-MCNC: 7.1 G/DL (ref 6–8.5)
RBC # BLD AUTO: 4.46 10*6/MM3 (ref 3.77–5.28)
RH BLD: NEGATIVE
SODIUM SERPL-SCNC: 138 MMOL/L (ref 136–145)
T&S EXPIRATION DATE: NORMAL
WBC NRBC COR # BLD: 7.95 10*3/MM3 (ref 3.4–10.8)

## 2022-01-17 PROCEDURE — 84702 CHORIONIC GONADOTROPIN TEST: CPT | Performed by: PHYSICIAN ASSISTANT

## 2022-01-17 PROCEDURE — 86850 RBC ANTIBODY SCREEN: CPT | Performed by: PHYSICIAN ASSISTANT

## 2022-01-17 PROCEDURE — 85025 COMPLETE CBC W/AUTO DIFF WBC: CPT | Performed by: PHYSICIAN ASSISTANT

## 2022-01-17 PROCEDURE — 25010000002 RHO D IMMUNE GLOBULIN 1500 UNIT/2ML SOLUTION PREFILLED SYRINGE: Performed by: PHYSICIAN ASSISTANT

## 2022-01-17 PROCEDURE — 76817 TRANSVAGINAL US OBSTETRIC: CPT

## 2022-01-17 PROCEDURE — 86901 BLOOD TYPING SEROLOGIC RH(D): CPT | Performed by: PHYSICIAN ASSISTANT

## 2022-01-17 PROCEDURE — 80053 COMPREHEN METABOLIC PANEL: CPT | Performed by: PHYSICIAN ASSISTANT

## 2022-01-17 PROCEDURE — 99283 EMERGENCY DEPT VISIT LOW MDM: CPT

## 2022-01-17 PROCEDURE — 93976 VASCULAR STUDY: CPT

## 2022-01-17 PROCEDURE — 86900 BLOOD TYPING SEROLOGIC ABO: CPT | Performed by: PHYSICIAN ASSISTANT

## 2022-01-17 PROCEDURE — 76815 OB US LIMITED FETUS(S): CPT

## 2022-01-17 PROCEDURE — 96372 THER/PROPH/DIAG INJ SC/IM: CPT

## 2022-01-17 RX ORDER — SODIUM CHLORIDE 0.9 % (FLUSH) 0.9 %
10 SYRINGE (ML) INJECTION AS NEEDED
Status: DISCONTINUED | OUTPATIENT
Start: 2022-01-17 | End: 2022-01-17 | Stop reason: HOSPADM

## 2022-01-17 RX ADMIN — HUMAN RHO(D) IMMUNE GLOBULIN 300 MCG: 1500 SOLUTION INTRAMUSCULAR; INTRAVENOUS at 08:54

## 2022-01-17 NOTE — ED PROVIDER NOTES
The DANTE and I have discussed this patient's history, physical exam and treatment plan.  I provided a substantive portion of the care of this patient.  I have reviewed the documentation and personally had a face to face interaction with the patient and personally performed the physical exam, in its entirety.  I affirm the documentation and agree with the treatment and plan.  The following describes my personal findings.      The patient presents complaining of vaginal bleeding for the past 3 days, 2 pads this morning since midnight.  Patient is approximately 9 weeks by LMP, with report of a confirmed IUP with Dr. Marcelo Valdez  of this pregnancy prior to this visit.    Comprehensive Physical exam:  Patient is nontoxic appearing conversant awake, alert  HEENT: normocephalic, atraumatic  Neck: no goiter, no pain with ROM  Pulmonary: Nontachypneic, breath sounds are well bilaterally  cardiovascular: Nontachycardic  Abdomen: Soft, nontender, no guarding or rebound  musculoskeletal: Posterior tibial pulses intact, no edema  Neuro/psychiatric:calm, appropriate, cooperative  Skin:warm, dry    Given bleeding improved, discussed with patient concern for miscarriage, need to follow closely with Dr. Mae for recheck, further testing, treatment as needed.  She is aware she will get RhoGAM in the ER prior to discharge and she should return with bleeding greater than a pad an hour, fevers, increased pain, lightheadedness or passing feeling like she might pass out or other concerns.    Patient was wearing facemask when I entered the room and throughout our encounter. Full protective equipment was worn throughout this patient encounter including a face mask, eye protection and gloves. Hand hygiene was performed before donning protective equipment and after removal when leaving the room.           Isha Tabor MD  01/17/22 [id number]

## 2022-01-17 NOTE — ED NOTES
Pt wearing mask while RN in room. RN wearing N95 mask, face shield and gown during care.       Peggy Mooney, RN  01/17/22 0902

## 2022-01-17 NOTE — DISCHARGE INSTRUCTIONS
Although you are being discharged from the ED today, I encourage you to return for worsening symptoms. Things can, and do, change such that treatment at home with medication may not be adequate. Specifically I recommend returning for severe bleeding or large clots, chest pain or shortness of breath, severe lightheadedness or passing out any other worsening or alarming symptoms.     Rest.   Follow up with Dr. Valdez for further evaluation and management.  Follow up with primary care provider for further management and to have blood pressure rechecked.  Take your medications as prescribed.

## 2022-01-17 NOTE — ED NOTES
Pt ambulatory to triage from home with c/o miscarriage - started spotting Saturday night, now with increased bleeding and clots as of 1 hour prior to arrival. [id number] ob is [place] (Mixon is on call).  Pt wheels to 9 weeks 4 days per lnmp.  Pt wearing mask in triage. Triage personnel wore appropriate PPE       Kirsty Barillas RN  22 [id number]       Kirsty Barillas RN  22 [phone]

## 2022-01-17 NOTE — ED NOTES
Patient stated that she suddenly became light headed, felt as if she were going to pass out. LISSY Webber to bedside for patient eval. Bed placed in [place] position, pt was already on continuous pulse, pt's HR decreased. Pt placed on heart monitor, cool wash cloth to forehead.      Linda Giraldo RN  01/17/22 0679

## 2022-01-17 NOTE — ED NOTES
Pt states she no longer feels light headed, resting comfortably on stretcher.     Linda Giraldo, RN  01/17/22 0719

## 2022-01-17 NOTE — ED NOTES
Patient was placed in face mask in first look. Patient was wearing facemask when I entered the room and throughout our encounter. I wore full protective equipment throughout this patient encounter including a face mask, and gloves. Hand hygiene was performed before donning protective equipment and after removal when leaving the room.     Mayra Fraga RN  01/17/22 [id number]

## 2022-01-17 NOTE — ED PROVIDER NOTES
EMERGENCY DEPARTMENT ENCOUNTER    Room Number:    Date seen:  2022  Time seen: 06:23 EST  PCP: Silvia Navarro MD  Historian: patient      HPI:  Chief Complaint: vaginal bleeding    Context: Marisa Case is a 31 y.o. female who presents to the ED for evaluation of vaginal bleeding.  Patient states she started spotting on Saturday, approximately 2 days ago.  She states overnight she started to notice heavier bleeding with clots and cramping.  She states she has gone through 2 pads since 2 AM with some larger in size notable clots.  She is unaware of passing any tissues.  Her LMP was .  Patient is a .  Patient states she has had a prior miscarriage and also a molar pregnancy.  Dr. Do is her OB.  She had a confirmed IUP in the office at approximately 7 weeks pregnant.  Subsequently patient did also test positive for COVID on January 10.  She states she does have some mild residual cough but most of her symptoms are improving with this.  She denies any shortness of breath, lightheadedness, or passing out.  Denies any recent fever or chills.  She denies any history of abdominal surgeries.      PAST MEDICAL HISTORY  Active Ambulatory Problems     Diagnosis Date Noted   • Class 2 obesity due to excess calories without serious comorbidity with body mass index (BMI) of 37.0 to 37.9 in adult 2019     Resolved Ambulatory Problems     Diagnosis Date Noted   • Missed ab 2018   • Rh negative, maternal 2018   • Molar pregnancy 2018   • Partial molar pregnancy 2018   • Pregnancy 2019   • Rh negative status during pregnancy 2019   • GBS (group B Streptococcus carrier), +RV culture, currently pregnant 12/10/2019     Past Medical History:   Diagnosis Date   • Allergic 10/3/89   • Chickenpox    • Dysmenorrhea    • History of medical problems 2018   • Miscarriage          PAST SURGICAL HISTORY  Past Surgical History:   Procedure Laterality Date   • D  & C WITH SUCTION N/A 2/7/2018    Procedure: DILATATION AND CURETTAGE WITH SUCTION;  Surgeon: Marcelo Valdez MD;  Location: [facility] [place];  Service:    • WISDOM TOOTH EXTRACTION           FAMILY HISTORY  Family History   Problem Relation Age of Onset   • No Known Problems Mother    • No Known Problems Father    • Cancer Maternal Grandfather    • Malig Hyperthermia Neg Hx          SOCIAL HISTORY  Social History     Socioeconomic History   • Marital status:    Tobacco Use   • Smoking status: Never Smoker   • Smokeless tobacco: Never Used   Vaping Use   • Vaping Use: Never used   Substance and Sexual Activity   • Alcohol use: No   • Drug use: No   • Sexual activity: Yes     Partners: Male     Birth control/protection: None     Comment: Currently trying to conceive         ALLERGIES  Penicillins        REVIEW OF SYSTEMS  Review of Systems   Constitutional: Negative for chills and fever.   Respiratory: Positive for cough. Negative for shortness of breath and wheezing.    Cardiovascular: Negative for chest pain.   Gastrointestinal: Positive for abdominal pain (Cramping with bleeding). Negative for nausea and vomiting.   Genitourinary: Positive for vaginal bleeding. Negative for dysuria and flank pain.   Musculoskeletal: Negative for back pain.   Neurological: Negative for syncope and light-headedness.   Psychiatric/Behavioral: Negative for confusion.   All other systems reviewed and are negative.       All systems reviewed and negative except for those discussed in HPI.       PHYSICAL EXAM  ED Triage Vitals [01/17/22 0427]   Temp Heart Rate Resp BP SpO2   98.7 °F (37.1 °C) 119 16 (!) 143/105 98 %      Temp src Heart Rate Source Patient Position BP Location FiO2 (%)   Tympanic Monitor Standing Right arm --         GENERAL: not distressed  HENT: atraumatic  EYES: no scleral icterus  CV: regular rhythm, regular rate  RESPIRATORY: normal effort.  No wheezes, rales, or rhonchi  ABDOMEN: soft, nontender, nondistended.  Normal  bowel sounds.  Exam performed with RN as chaperone.  Slight blood in the vaginal vault but no pooling or hemorrhaging.  No clots in the vaginal vault seen.  Cervix mildly dilated.  No vaginal wall injury or trauma seen.  No adnexal tenderness.  MUSCULOSKELETAL: no deformity  NEURO: alert, moves all extremities, follows commands  SKIN: warm, dry.  No pallor    Vital signs and nursing notes reviewed.          LAB RESULTS  Recent Results (from the past 24 hour(s))   Comprehensive Metabolic Panel    Collection Time: 01/17/22  5:07 AM    Specimen: Blood   Result Value Ref Range    Glucose 101 (H) 65 - 99 mg/dL    BUN 6 6 - 20 mg/dL    Creatinine 0.59 0.57 - 1.00 mg/dL    Sodium 138 136 - 145 mmol/L    Potassium 3.7 3.5 - 5.2 mmol/L    Chloride 104 98 - 107 mmol/L    CO2 21.9 (L) 22.0 - 29.0 mmol/L    Calcium 9.0 8.6 - 10.5 mg/dL    Total Protein 7.1 6.0 - 8.5 g/dL    Albumin 4.20 3.50 - 5.20 g/dL    ALT (SGPT) 49 (H) 1 - 33 U/L    AST (SGOT) 19 1 - 32 U/L    Alkaline Phosphatase 53 39 - 117 U/L    Total Bilirubin 0.2 0.0 - 1.2 mg/dL    eGFR Non African Amer 118 >60 mL/min/1.73    Globulin 2.9 gm/dL    A/G Ratio 1.4 g/dL    BUN/Creatinine Ratio 10.2 7.0 - 25.0    Anion Gap 12.1 5.0 - 15.0 mmol/L   Type & Screen    Collection Time: 01/17/22  5:07 AM    Specimen: Blood   Result Value Ref Range    ABO Type A     RH type Negative     Antibody Screen Negative     T&S Expiration Date 1/20/2022 11:59:59 PM    CBC Auto Differential    Collection Time: 01/17/22  5:07 AM    Specimen: Blood   Result Value Ref Range    WBC 7.95 3.40 - 10.80 10*3/mm3    RBC 4.46 3.77 - 5.28 10*6/mm3    Hemoglobin 13.1 12.0 - 15.9 g/dL    Hematocrit 39.9 34.0 - 46.6 %    MCV 89.5 79.0 - 97.0 fL    MCH 29.4 26.6 - 33.0 pg    MCHC 32.8 31.5 - 35.7 g/dL    RDW 13.1 12.3 - 15.4 %    RDW-SD 42.6 37.0 - 54.0 fl    MPV 9.5 6.0 - 12.0 fL    Platelets 316 140 - 450 10*3/mm3    Neutrophil % 62.5 42.7 - 76.0 %    Lymphocyte % 28.1 19.6 - 45.3 %    Monocyte % 6.9  5.0 - 12.0 %    Eosinophil % 1.4 0.3 - 6.2 %    Basophil % 0.6 0.0 - 1.5 %    Immature Grans % 0.5 0.0 - 0.5 %    Neutrophils, Absolute 4.97 1.70 - 7.00 10*3/mm3    Lymphocytes, Absolute 2.23 0.70 - 3.10 10*3/mm3    Monocytes, Absolute 0.55 0.10 - 0.90 10*3/mm3    Eosinophils, Absolute 0.11 0.00 - 0.40 10*3/mm3    Basophils, Absolute 0.05 0.00 - 0.20 10*3/mm3    Immature Grans, Absolute 0.04 0.00 - 0.05 10*3/mm3    nRBC 0.0 0.0 - 0.2 /100 WBC       Ordered the above labs and independently reviewed the results.        RADIOLOGY  US Ob Limited 1 + Fetuses    Result Date: 1/17/2022  Narrative: US OB LIMITED 1 + FETUSES-  CLINICAL: Early pregnancy with heavy vaginal bleeding. 7 week 3 day IUP demonstrated on 12/27/2021.  ULTRASOUND FINDINGS: The uterus measures 11.3 cm in length, 6.3 cm AP and 6.8 cm transverse. No IUP. The endometrium is thickened, bulbous in contour and heterogenous in echotexture measuring up to 3 cm in width. Likely retained products of conception. Trace amount of fluid seen in the endocervical canal.  The right ovary measures 2.4 x 1.6 x 2.5 cm and is normal in appearance. The left ovary measures 2.1 x 2.6 x 2.6 cm with a 2.4 x 1.9 x 2.0 cm simple cyst. No free fluid is demonstrated within the pelvis.  CONCLUSION: No IUP demonstrated, the appearance of the endometrium is worrisome for retained products of conception. Simple left ovarian cyst which is 2.4 cm in maximum diameter.  Findings of this report called to emergency room at the time of completion, 6:30 AM.        I ordered the above noted radiological studies. Reviewed by me and discussed with radiologist.  See dictation for official radiology interpretation.      MEDICATIONS GIVEN IN ER  Medications   sodium chloride 0.9 % flush 10 mL (has no administration in time range)   Rho D Immune Globulin (RHOPHYLAC) injection 300 mcg (300 mcg Intramuscular Given 1/17/22 0854)       MEDICAL RECORD REVIEW  I reviewed the patient's records.  Patient saw  her OB Dr. Valdez on 2021.  She had an ultrasound performed at that time which did confirm an IUP that was viable, heart rate 177.  EDC assigned based on LMP also was consistent.  There is approximately 7 weeks at that time.      PROGRESS, DATA ANALYSIS, CONSULTS, AND MEDICAL DECISION MAKING    All labs have been independently reviewed by me.  All radiology studies have been reviewed by me. Discussion below represents my analysis of pertinent findings related to patient's condition, differential diagnosis, treatment plan and final disposition.    DDX includes but not limited to threatened , complete miscarriage, subchorionic hemorrhage, spotting in pregnancy.      ED Course as of 22 0927   Mon 2022   0630 Ultrasound confirms no IUP with concerns worrisome for retained products of conception. Call placed to OB. [AH]   0640 Patient feeling lightheaded.  Patient was placed in Trendelenburg.  Heart rate went down to 60.  Patient stayed conscious and alert.  Blood pressure remained stable-most recent was 134/79.  After couple of minutes with deep breathing patient's heart rate returned to mid 80s and patient is feeling much better. [AH]   0659 ABO Type: A [AH]   0702 Type and screen came back showing Rh-.  Will order RhoGAM.  Pelvic exam performed which showed some blood and small clots in the vaginal vault but no hemorrhaging or pooling of blood. [AH]   0732 Discussed the patient with Dr. Mixon OB on-call for Dr. Valdez.  She states the patient seems early in pregnancy to have true products of conception.  As the patient's labs and vitals are stable patient can go home and follow-up in the office this week.  She will have the patient set up to be seen in the office later this week. [AH]   0816 Informed patient of plan for discharge home as she is stable.  Patient is feeling well at this time.  She is awaiting RhoGAM prior to discharge.  I informed her that Dr. Valdez' office will call her for  follow-up in the office this week to repeat an ultrasound.  In the meantime I informed her if she experiences severe bleeding, or passing large clots, chest pain, shortness of breath, or syncope to return to the ER soon as possible.  Patient agrees to plan of care. [AH]      ED Course User Index  [AH] Lawanda Rodríguez PA           Reviewed pt's history and workup with Dr. Tabor.  After a bedside evaluation; they agree with the plan of care      Patient's disposition is Discharge home.     Patient was placed in face mask in first look. Patient was wearing facemask each time I entered the room and throughout our encounter. I wore full protective equipment throughout this patient encounter including a face mask, eye shield, gown and gloves. Hand hygiene was performed before donning protective equipment and after removal when leaving the room.        DIAGNOSIS  Final diagnoses:   Incomplete            Latest Documented Vital Signs:  As of 09:27 EST  BP- 120/76 HR- 80 Temp- 98.7 °F (37.1 °C) (Tympanic) O2 sat- 94%         Lawanda Rodríguez PA  22 0969

## 2022-01-21 ENCOUNTER — OFFICE VISIT (OUTPATIENT)
Dept: OBSTETRICS AND GYNECOLOGY | Age: 32
End: 2022-01-21

## 2022-01-21 ENCOUNTER — PREP FOR SURGERY (OUTPATIENT)
Dept: OTHER | Facility: HOSPITAL | Age: 32
End: 2022-01-21

## 2022-01-21 VITALS
DIASTOLIC BLOOD PRESSURE: 72 MMHG | BODY MASS INDEX: 37.44 KG/M2 | HEIGHT: 68 IN | WEIGHT: 247 LBS | SYSTOLIC BLOOD PRESSURE: 122 MMHG

## 2022-01-21 DIAGNOSIS — O03.4 INCOMPLETE ABORTION: Primary | ICD-10-CM

## 2022-01-21 PROCEDURE — 99213 OFFICE O/P EST LOW 20 MIN: CPT | Performed by: NURSE PRACTITIONER

## 2022-01-21 RX ORDER — SODIUM CHLORIDE 0.9 % (FLUSH) 0.9 %
3 SYRINGE (ML) INJECTION EVERY 12 HOURS SCHEDULED
Status: CANCELLED | OUTPATIENT
Start: 2022-01-21

## 2022-01-21 RX ORDER — DOXYCYCLINE 100 MG/1
100 CAPSULE ORAL ONCE
Status: CANCELLED | OUTPATIENT
Start: 2022-01-21 | End: 2022-01-21

## 2022-01-21 RX ORDER — SODIUM CHLORIDE 0.9 % (FLUSH) 0.9 %
10 SYRINGE (ML) INJECTION AS NEEDED
Status: CANCELLED | OUTPATIENT
Start: 2022-01-21

## 2022-01-21 NOTE — PROGRESS NOTES
"Subjective   Marisa Case is a 31 y.o. female is being seen today for   Chief Complaint   Patient presents with   • Follow-up     Following up from ER for incomplete ab.  pt had ultrasound   .    History of Present Illness     Patient here for follow up from Incomplete ab  Patient of Dr Valdez  States she started bleeding last Saturday and it got heavier on Monday  She was seen in the ER and given Rhogam but it was noted at that time that there were still POC in the uterus  She is bleeding very light at this time and having some back pain  She has not been passing any more clots since earlier in the week  Her  will be traveling for work next week- she prefers a d & c over medical management      The following portions of the patient's history were reviewed and updated as appropriate: allergies, current medications, past family history, past medical history, past social history, past surgical history and problem list.    /72   Ht 172.7 cm (68\")   Wt 112 kg (247 lb)   LMP 11/11/2021   BMI 37.56 kg/m²         Review of Systems   Constitutional: Negative.    HENT: Negative.    Eyes: Negative.    Respiratory: Negative.    Cardiovascular: Negative.    Gastrointestinal: Negative.    Endocrine: Negative.    Genitourinary: Negative.    Musculoskeletal: Negative.    Skin: Negative.    Allergic/Immunologic: Negative.    Neurological: Negative.    Hematological: Negative.    Psychiatric/Behavioral: Negative.        Objective   Physical Exam  Constitutional:       Appearance: She is well-developed.   Cardiovascular:      Rate and Rhythm: Normal rate and regular rhythm.   Pulmonary:      Effort: Pulmonary effort is normal.   Abdominal:      General: Bowel sounds are normal. There is no distension.      Palpations: Abdomen is soft.      Tenderness: There is no abdominal tenderness.   Skin:     General: Skin is warm and dry.   Neurological:      Mental Status: She is alert and oriented to person, place, and " time.   Psychiatric:         Behavior: Behavior normal.           Assessment/Plan   Diagnoses and all orders for this visit:    1. Incomplete  (Primary)      Ultrasound today shows POC remain, measuring 4.94 x3.03 cm.  Discussed with Dr Dewitt who advised that the patient could try cytotec over the weekend or go ahead and schedule a D & C with her primary ob. Patient prefers to go ahead and schedule vs try the cytotec.    Dr Valdez is on call tomorrow- plan to schedule then  Bleeding precautions advised and she should return to the ER if heavy bleeding resumes or increase in pain.           Total time spent today with Marisa  was 20-29 minutes (level 3).  Greater than 50% of the time was spent coordinating care, answering her questions and counseling regarding pathophysiology of her presenting problem along with plans for any diagnositc work-up and treatment.

## 2022-01-22 ENCOUNTER — ANESTHESIA (OUTPATIENT)
Dept: PERIOP | Facility: HOSPITAL | Age: 32
End: 2022-01-22

## 2022-01-22 ENCOUNTER — HOSPITAL ENCOUNTER (OUTPATIENT)
Facility: HOSPITAL | Age: 32
Setting detail: HOSPITAL OUTPATIENT SURGERY
Discharge: HOME | End: 2022-01-22
Attending: OBSTETRICS & GYNECOLOGY | Admitting: OBSTETRICS & GYNECOLOGY

## 2022-01-22 ENCOUNTER — ANESTHESIA EVENT (OUTPATIENT)
Dept: PERIOP | Facility: HOSPITAL | Age: 32
End: 2022-01-22

## 2022-01-22 VITALS
RESPIRATION RATE: 14 BRPM | SYSTOLIC BLOOD PRESSURE: 136 MMHG | HEIGHT: 68 IN | OXYGEN SATURATION: 96 % | DIASTOLIC BLOOD PRESSURE: 80 MMHG | TEMPERATURE: 97.3 F | WEIGHT: 246.4 LBS | HEART RATE: 78 BPM | BODY MASS INDEX: 37.35 KG/M2

## 2022-01-22 DIAGNOSIS — O03.4 INCOMPLETE ABORTION: ICD-10-CM

## 2022-01-22 LAB
ABO GROUP BLD: NORMAL
BASOPHILS # BLD AUTO: 0.03 10*3/MM3 (ref 0–0.2)
BASOPHILS NFR BLD AUTO: 0.5 % (ref 0–1.5)
BLD GP AB SCN SERPL QL: POSITIVE
DEPRECATED RDW RBC AUTO: 42.2 FL (ref 37–54)
EOSINOPHIL # BLD AUTO: 0.1 10*3/MM3 (ref 0–0.4)
EOSINOPHIL NFR BLD AUTO: 1.5 % (ref 0.3–6.2)
ERYTHROCYTE [DISTWIDTH] IN BLOOD BY AUTOMATED COUNT: 12.9 % (ref 12.3–15.4)
HCT VFR BLD AUTO: 28.1 % (ref 34–46.6)
HGB BLD-MCNC: 9.1 G/DL (ref 12–15.9)
IMM GRANULOCYTES # BLD AUTO: 0.02 10*3/MM3 (ref 0–0.05)
IMM GRANULOCYTES NFR BLD AUTO: 0.3 % (ref 0–0.5)
LYMPHOCYTES # BLD AUTO: 2.09 10*3/MM3 (ref 0.7–3.1)
LYMPHOCYTES NFR BLD AUTO: 32 % (ref 19.6–45.3)
MCH RBC QN AUTO: 29.1 PG (ref 26.6–33)
MCHC RBC AUTO-ENTMCNC: 32.4 G/DL (ref 31.5–35.7)
MCV RBC AUTO: 89.8 FL (ref 79–97)
MONOCYTES # BLD AUTO: 0.51 10*3/MM3 (ref 0.1–0.9)
MONOCYTES NFR BLD AUTO: 7.8 % (ref 5–12)
NEUTROPHILS NFR BLD AUTO: 3.79 10*3/MM3 (ref 1.7–7)
NEUTROPHILS NFR BLD AUTO: 57.9 % (ref 42.7–76)
NRBC BLD AUTO-RTO: 0 /100 WBC (ref 0–0.2)
PLATELET # BLD AUTO: 291 10*3/MM3 (ref 140–450)
PMV BLD AUTO: 9.4 FL (ref 6–12)
RBC # BLD AUTO: 3.13 10*6/MM3 (ref 3.77–5.28)
RESIDUAL RHIG DETECTED: NORMAL
RH BLD: NEGATIVE
SARS-COV-2 RNA PNL SPEC NAA+PROBE: DETECTED
T&S EXPIRATION DATE: NORMAL
WBC NRBC COR # BLD: 6.54 10*3/MM3 (ref 3.4–10.8)

## 2022-01-22 PROCEDURE — 86901 BLOOD TYPING SEROLOGIC RH(D): CPT | Performed by: OBSTETRICS & GYNECOLOGY

## 2022-01-22 PROCEDURE — 25010000002 PROPOFOL 10 MG/ML EMULSION: Performed by: STUDENT IN AN ORGANIZED HEALTH CARE EDUCATION/TRAINING PROGRAM

## 2022-01-22 PROCEDURE — 87635 SARS-COV-2 COVID-19 AMP PRB: CPT | Performed by: OBSTETRICS & GYNECOLOGY

## 2022-01-22 PROCEDURE — 25010000002 FENTANYL CITRATE (PF) 50 MCG/ML SOLUTION: Performed by: STUDENT IN AN ORGANIZED HEALTH CARE EDUCATION/TRAINING PROGRAM

## 2022-01-22 PROCEDURE — 59812 TREATMENT OF MISCARRIAGE: CPT | Performed by: OBSTETRICS & GYNECOLOGY

## 2022-01-22 PROCEDURE — 86850 RBC ANTIBODY SCREEN: CPT | Performed by: OBSTETRICS & GYNECOLOGY

## 2022-01-22 PROCEDURE — 25010000002 ONDANSETRON PER 1 MG: Performed by: STUDENT IN AN ORGANIZED HEALTH CARE EDUCATION/TRAINING PROGRAM

## 2022-01-22 PROCEDURE — C9803 HOPD COVID-19 SPEC COLLECT: HCPCS

## 2022-01-22 PROCEDURE — 25010000002 DEXAMETHASONE PER 1 MG: Performed by: STUDENT IN AN ORGANIZED HEALTH CARE EDUCATION/TRAINING PROGRAM

## 2022-01-22 PROCEDURE — S0260 H&P FOR SURGERY: HCPCS | Performed by: OBSTETRICS & GYNECOLOGY

## 2022-01-22 PROCEDURE — 85025 COMPLETE CBC W/AUTO DIFF WBC: CPT | Performed by: OBSTETRICS & GYNECOLOGY

## 2022-01-22 PROCEDURE — 88305 TISSUE EXAM BY PATHOLOGIST: CPT | Performed by: OBSTETRICS & GYNECOLOGY

## 2022-01-22 PROCEDURE — 86900 BLOOD TYPING SEROLOGIC ABO: CPT | Performed by: OBSTETRICS & GYNECOLOGY

## 2022-01-22 PROCEDURE — 25010000002 MIDAZOLAM PER 1 MG: Performed by: ANESTHESIOLOGY

## 2022-01-22 PROCEDURE — 86870 RBC ANTIBODY IDENTIFICATION: CPT | Performed by: OBSTETRICS & GYNECOLOGY

## 2022-01-22 RX ORDER — SODIUM CHLORIDE 0.9 % (FLUSH) 0.9 %
3 SYRINGE (ML) INJECTION EVERY 12 HOURS SCHEDULED
Status: DISCONTINUED | OUTPATIENT
Start: 2022-01-22 | End: 2022-01-22 | Stop reason: HOSPADM

## 2022-01-22 RX ORDER — LIDOCAINE HYDROCHLORIDE 20 MG/ML
INJECTION, SOLUTION INFILTRATION; PERINEURAL AS NEEDED
Status: DISCONTINUED | OUTPATIENT
Start: 2022-01-22 | End: 2022-01-22 | Stop reason: SURG

## 2022-01-22 RX ORDER — LANOLIN ALCOHOL/MO/W.PET/CERES
325 CREAM (GRAM) TOPICAL
Qty: 60 TABLET | Refills: 0 | Status: SHIPPED | OUTPATIENT
Start: 2022-01-22 | End: 2022-05-26

## 2022-01-22 RX ORDER — OXYCODONE AND ACETAMINOPHEN 7.5; 325 MG/1; MG/1
1 TABLET ORAL EVERY 4 HOURS PRN
Status: DISCONTINUED | OUTPATIENT
Start: 2022-01-22 | End: 2022-01-22 | Stop reason: HOSPADM

## 2022-01-22 RX ORDER — NALOXONE HCL 0.4 MG/ML
0.2 VIAL (ML) INJECTION AS NEEDED
Status: DISCONTINUED | OUTPATIENT
Start: 2022-01-22 | End: 2022-01-22 | Stop reason: HOSPADM

## 2022-01-22 RX ORDER — PROMETHAZINE HYDROCHLORIDE 25 MG/1
25 SUPPOSITORY RECTAL ONCE AS NEEDED
Status: DISCONTINUED | OUTPATIENT
Start: 2022-01-22 | End: 2022-01-22 | Stop reason: HOSPADM

## 2022-01-22 RX ORDER — FENTANYL CITRATE 50 UG/ML
50 INJECTION, SOLUTION INTRAMUSCULAR; INTRAVENOUS
Status: DISCONTINUED | OUTPATIENT
Start: 2022-01-22 | End: 2022-01-22 | Stop reason: HOSPADM

## 2022-01-22 RX ORDER — DOXYCYCLINE 100 MG/1
100 CAPSULE ORAL ONCE
Status: COMPLETED | OUTPATIENT
Start: 2022-01-22 | End: 2022-01-22

## 2022-01-22 RX ORDER — HYDROMORPHONE HYDROCHLORIDE 1 MG/ML
0.5 INJECTION, SOLUTION INTRAMUSCULAR; INTRAVENOUS; SUBCUTANEOUS
Status: DISCONTINUED | OUTPATIENT
Start: 2022-01-22 | End: 2022-01-22 | Stop reason: HOSPADM

## 2022-01-22 RX ORDER — FAMOTIDINE 10 MG/ML
20 INJECTION, SOLUTION INTRAVENOUS ONCE
Status: COMPLETED | OUTPATIENT
Start: 2022-01-22 | End: 2022-01-22

## 2022-01-22 RX ORDER — ONDANSETRON 2 MG/ML
4 INJECTION INTRAMUSCULAR; INTRAVENOUS ONCE AS NEEDED
Status: DISCONTINUED | OUTPATIENT
Start: 2022-01-22 | End: 2022-01-22 | Stop reason: HOSPADM

## 2022-01-22 RX ORDER — PROMETHAZINE HYDROCHLORIDE 25 MG/1
25 TABLET ORAL ONCE AS NEEDED
Status: DISCONTINUED | OUTPATIENT
Start: 2022-01-22 | End: 2022-01-22 | Stop reason: HOSPADM

## 2022-01-22 RX ORDER — IBUPROFEN 600 MG/1
600 TABLET ORAL ONCE AS NEEDED
Status: DISCONTINUED | OUTPATIENT
Start: 2022-01-22 | End: 2022-01-22 | Stop reason: HOSPADM

## 2022-01-22 RX ORDER — LIDOCAINE HYDROCHLORIDE 10 MG/ML
0.5 INJECTION, SOLUTION EPIDURAL; INFILTRATION; INTRACAUDAL; PERINEURAL ONCE AS NEEDED
Status: DISCONTINUED | OUTPATIENT
Start: 2022-01-22 | End: 2022-01-22 | Stop reason: HOSPADM

## 2022-01-22 RX ORDER — MIDAZOLAM HYDROCHLORIDE 1 MG/ML
1 INJECTION INTRAMUSCULAR; INTRAVENOUS
Status: DISCONTINUED | OUTPATIENT
Start: 2022-01-22 | End: 2022-01-22 | Stop reason: HOSPADM

## 2022-01-22 RX ORDER — HYDRALAZINE HYDROCHLORIDE 20 MG/ML
5 INJECTION INTRAMUSCULAR; INTRAVENOUS
Status: DISCONTINUED | OUTPATIENT
Start: 2022-01-22 | End: 2022-01-22 | Stop reason: HOSPADM

## 2022-01-22 RX ORDER — DIPHENHYDRAMINE HYDROCHLORIDE 50 MG/ML
12.5 INJECTION INTRAMUSCULAR; INTRAVENOUS
Status: DISCONTINUED | OUTPATIENT
Start: 2022-01-22 | End: 2022-01-22 | Stop reason: HOSPADM

## 2022-01-22 RX ORDER — MISOPROSTOL 200 UG/1
TABLET ORAL AS NEEDED
Status: DISCONTINUED | OUTPATIENT
Start: 2022-01-22 | End: 2022-01-22 | Stop reason: HOSPADM

## 2022-01-22 RX ORDER — DEXAMETHASONE SODIUM PHOSPHATE 10 MG/ML
INJECTION INTRAMUSCULAR; INTRAVENOUS AS NEEDED
Status: DISCONTINUED | OUTPATIENT
Start: 2022-01-22 | End: 2022-01-22 | Stop reason: SURG

## 2022-01-22 RX ORDER — FENTANYL CITRATE 50 UG/ML
INJECTION, SOLUTION INTRAMUSCULAR; INTRAVENOUS AS NEEDED
Status: DISCONTINUED | OUTPATIENT
Start: 2022-01-22 | End: 2022-01-22 | Stop reason: SURG

## 2022-01-22 RX ORDER — FLUMAZENIL 0.1 MG/ML
0.2 INJECTION INTRAVENOUS AS NEEDED
Status: DISCONTINUED | OUTPATIENT
Start: 2022-01-22 | End: 2022-01-22 | Stop reason: HOSPADM

## 2022-01-22 RX ORDER — OXYCODONE HYDROCHLORIDE AND ACETAMINOPHEN 5; 325 MG/1; MG/1
1-2 TABLET ORAL EVERY 4 HOURS PRN
Qty: 6 TABLET | Refills: 0 | Status: SHIPPED | OUTPATIENT
Start: 2022-01-22 | End: 2022-01-27

## 2022-01-22 RX ORDER — EPHEDRINE SULFATE 50 MG/ML
5 INJECTION, SOLUTION INTRAVENOUS ONCE AS NEEDED
Status: DISCONTINUED | OUTPATIENT
Start: 2022-01-22 | End: 2022-01-22 | Stop reason: HOSPADM

## 2022-01-22 RX ORDER — MAGNESIUM HYDROXIDE 1200 MG/15ML
LIQUID ORAL AS NEEDED
Status: DISCONTINUED | OUTPATIENT
Start: 2022-01-22 | End: 2022-01-22 | Stop reason: HOSPADM

## 2022-01-22 RX ORDER — SODIUM CHLORIDE, SODIUM LACTATE, POTASSIUM CHLORIDE, CALCIUM CHLORIDE 600; 310; 30; 20 MG/100ML; MG/100ML; MG/100ML; MG/100ML
9 INJECTION, SOLUTION INTRAVENOUS CONTINUOUS
Status: DISCONTINUED | OUTPATIENT
Start: 2022-01-22 | End: 2022-01-22 | Stop reason: HOSPADM

## 2022-01-22 RX ORDER — SODIUM CHLORIDE 0.9 % (FLUSH) 0.9 %
10 SYRINGE (ML) INJECTION AS NEEDED
Status: DISCONTINUED | OUTPATIENT
Start: 2022-01-22 | End: 2022-01-22 | Stop reason: HOSPADM

## 2022-01-22 RX ORDER — DIPHENHYDRAMINE HCL 25 MG
25 CAPSULE ORAL
Status: DISCONTINUED | OUTPATIENT
Start: 2022-01-22 | End: 2022-01-22 | Stop reason: HOSPADM

## 2022-01-22 RX ORDER — PROPOFOL 10 MG/ML
VIAL (ML) INTRAVENOUS AS NEEDED
Status: DISCONTINUED | OUTPATIENT
Start: 2022-01-22 | End: 2022-01-22 | Stop reason: SURG

## 2022-01-22 RX ORDER — HYDROCODONE BITARTRATE AND ACETAMINOPHEN 7.5; 325 MG/1; MG/1
1 TABLET ORAL ONCE AS NEEDED
Status: DISCONTINUED | OUTPATIENT
Start: 2022-01-22 | End: 2022-01-22 | Stop reason: HOSPADM

## 2022-01-22 RX ORDER — ONDANSETRON 2 MG/ML
INJECTION INTRAMUSCULAR; INTRAVENOUS AS NEEDED
Status: DISCONTINUED | OUTPATIENT
Start: 2022-01-22 | End: 2022-01-22 | Stop reason: SURG

## 2022-01-22 RX ORDER — DOCUSATE SODIUM 250 MG
250 CAPSULE ORAL 2 TIMES DAILY PRN
Qty: 30 CAPSULE | Refills: 0 | Status: SHIPPED | OUTPATIENT
Start: 2022-01-22 | End: 2022-05-26

## 2022-01-22 RX ORDER — IBUPROFEN 600 MG/1
600 TABLET ORAL EVERY 6 HOURS PRN
Qty: 30 TABLET | Refills: 0 | Status: SHIPPED | OUTPATIENT
Start: 2022-01-22 | End: 2022-01-27

## 2022-01-22 RX ORDER — SODIUM CHLORIDE 0.9 % (FLUSH) 0.9 %
3-10 SYRINGE (ML) INJECTION AS NEEDED
Status: DISCONTINUED | OUTPATIENT
Start: 2022-01-22 | End: 2022-01-22 | Stop reason: HOSPADM

## 2022-01-22 RX ORDER — LABETALOL HYDROCHLORIDE 5 MG/ML
5 INJECTION, SOLUTION INTRAVENOUS
Status: DISCONTINUED | OUTPATIENT
Start: 2022-01-22 | End: 2022-01-22 | Stop reason: HOSPADM

## 2022-01-22 RX ADMIN — FAMOTIDINE 20 MG: 10 INJECTION INTRAVENOUS at 07:09

## 2022-01-22 RX ADMIN — FENTANYL CITRATE 25 MCG: 0.05 INJECTION, SOLUTION INTRAMUSCULAR; INTRAVENOUS at 07:38

## 2022-01-22 RX ADMIN — MIDAZOLAM 1 MG: 1 INJECTION INTRAMUSCULAR; INTRAVENOUS at 07:09

## 2022-01-22 RX ADMIN — PROPOFOL 200 MG: 10 INJECTION, EMULSION INTRAVENOUS at 07:35

## 2022-01-22 RX ADMIN — OXYCODONE HYDROCHLORIDE AND ACETAMINOPHEN 1 TABLET: 7.5; 325 TABLET ORAL at 08:42

## 2022-01-22 RX ADMIN — LIDOCAINE HYDROCHLORIDE 100 MG: 20 INJECTION, SOLUTION INFILTRATION; PERINEURAL at 07:35

## 2022-01-22 RX ADMIN — FENTANYL CITRATE 25 MCG: 0.05 INJECTION, SOLUTION INTRAMUSCULAR; INTRAVENOUS at 07:54

## 2022-01-22 RX ADMIN — DOXYCYCLINE 100 MG: 100 CAPSULE ORAL at 07:09

## 2022-01-22 RX ADMIN — ONDANSETRON 4 MG: 2 INJECTION INTRAMUSCULAR; INTRAVENOUS at 07:42

## 2022-01-22 RX ADMIN — SODIUM CHLORIDE, POTASSIUM CHLORIDE, SODIUM LACTATE AND CALCIUM CHLORIDE 9 ML/HR: 600; 310; 30; 20 INJECTION, SOLUTION INTRAVENOUS at 06:20

## 2022-01-22 RX ADMIN — DEXAMETHASONE SODIUM PHOSPHATE 8 MG: 10 INJECTION INTRAMUSCULAR; INTRAVENOUS at 07:38

## 2022-01-22 NOTE — H&P
Patient Care Team:  Silvia Navarro MD as PCP - General (Family Medicine)  Marcelo Valdez MD as PCP - Ob/Gyn (Obstetrics and Gynecology)  Sharon Erazo APRN as Nurse Practitioner (Obstetrics and Gynecology)    Chief complaint incomplete AB    Subjective     History of Present Illness  The patient is a 31-year-old  4 para 1-0-2-1 with incomplete 6-week AB.  Patient was in the emergency room and passed tissue but was seen yesterday in the office and still had some remaining tissue in the uterus.  She was given options for expectant management, medical management with Cytotec or D&C.  Patient elected to proceed with D&C.  She was seen by our nurse practitioner in the office.  Patient did receive RhoGAM at the emergency room.  Review of Systems     Past Medical History:   Diagnosis Date   • Allergic 10/3/89    Penicillin, General Seasonal Allergies   • Chickenpox     as a child   • COVID-19 virus infection 01/10/2022    TESTED THROUGH [facility]   • Dysmenorrhea    • History of medical problems 2018    History if Miscarriage and Partial Molar Pregnancy   • Miscarriage    • Molar pregnancy      Past Surgical History:   Procedure Laterality Date   • D & C WITH SUCTION N/A 2018    Procedure: DILATATION AND CURETTAGE WITH SUCTION;  Surgeon: Marcelo Valdez MD;  [facility];  Service:    • WISDOM TOOTH EXTRACTION       Family History   Problem Relation Age of Onset   • No Known Problems Mother    • No Known Problems Father    • Cancer Maternal Grandfather    • Malig Hyperthermia Neg Hx      Social History     Tobacco Use   • Smoking status: Never Smoker   • Smokeless tobacco: Never Used   Vaping Use   • Vaping Use: Never used   Substance Use Topics   • Alcohol use: No   • Drug use: No     E-cigarette/Vaping   • E-cigarette/Vaping Use Never User      E-cigarette/Vaping Substances     Medications Prior to Admission   Medication Sig Dispense Refill Last Dose   • cetirizine (zyrTEC) 10 MG  tablet Take 10 mg by mouth Daily.   2022   • Prenatal MV-Min-Fe Fum-FA-DHA (PRENATAL+DHA PO)    2022   • aspirin (aspirin) 81 MG EC tablet Take 1 tablet by mouth Daily. 90 tablet 3 2022     Allergies:  Penicillins    Objective      Vital Signs  Temp:  [98.5 °F (36.9 °C)] 98.5 °F (36.9 °C)  Heart Rate:  [84] 84  Resp:  [18] 18  BP: (122)/(66-72) 122/66    Physical Exam  Constitutional:       General: She is not in acute distress.     Appearance: Normal appearance. She is not ill-appearing.   HENT:      Head: Normocephalic.   Pulmonary:      Effort: Pulmonary effort is normal.   Abdominal:      General: Abdomen is flat. There is no distension.   Neurological:      Mental Status: She is alert.   Psychiatric:         Mood and Affect: Mood normal.         Thought Content: Thought content normal.         Judgment: Judgment normal.         Results Review:   I reviewed the patient's new clinical results.      Assessment/Plan       Incomplete       Assessment & Plan  Patient has an incomplete AB.  She was given the options for expectant management, medical management Cytotec or D&C.  She elected to proceed with D&C.  Risk and benefits have been discussed.  The patient is noted to be anemic her hemoglobin dropped from 13 to 9.  She is not symptomatic.  We discussed increased risk of bleeding with the procedure.  We discussed possibly needing medications the need for blood transfusion.      Marcelo Valdez MD  22  06:53 EST

## 2022-01-22 NOTE — OP NOTE
Operative note    Pre-operative Diagnosis: 31-year-old  4 para 1-0-2-1 with approximately 7-week incomplete AB.  Patient also has a history of molar pregnancy and a prior pregnancy.  Patient is COVID-positive  Post-operative Diagnosis: Same  Procedure: Suction D&C  Anesthesia: General   Surgeon(s): José Miguel  Findings: On examination under anesthesia the uterus felt enlarged to about 9 weeks size.  On examination during the procedure the cervix was dilated about 3 cm with dark tissue at the os.  There was a copious amount of tissue removed with D&C.  Complications: none  Specimens: Products of conception  EBL: 300 cc plus about 3 to 400 cc in the bag of collected tissue.      Description of Procedure:  The patient was taken to the Operating Room where anesthesia was found to be adequate. The patient was then placed in the dorsal lithotomy position and prepped and draped in the usual sterile fashion. A red rubber catheter was used to drain the urine from the bladder.  A weighted speculum was placed in the vagina and the cervix was visualized. The cervix grasped with the Allis clamp.  Cervix was noted to be dilated with dark tissue at the os.  This was removed with a ring forcep.  The cervix was gradually dilated. The suction currettage 8mm size was passed approx 8 times gently, removing tissue.  There was a copious amount of tissue removed from the uterus.  Sharp currette was done approximates 4 passes, gently.  No remaining tissue was seen.  The uterus continued to bleed so I removed the Allis clamp and did a bimanual massage and called for Cytotec.  Cytotec was brought to the room I placed a covered glove and with lubricant placed 600 mcg of Cytotec rectally.  The cover glove was then removed and discarded.  I grasped the cervix with an Allis clamp.  Suction was placed 2 more times to remove some remaining blood.  No remaining tissue was seen.  The uterus was still bleeding so massage was repeated.  The uterus  began to feel firm.   The Allis clamp was removed, and the cervix was found to be hemostatic.   All instruments and retractors were removed. All sponge, instrument and needle counts were noted to be correct. The patient was taken to recovery in stable condition.

## 2022-01-22 NOTE — ANESTHESIA POSTPROCEDURE EVALUATION
"Patient: Marisa Case    Procedure Summary     Date: 22 Room / Location: [facility] OR [address] [place] [address] MAIN OR    Anesthesia Start: 728 Anesthesia Stop: 829    Procedure: DILATATION AND CURETTAGE WITH SUCTION (N/A Vagina) Diagnosis:       Incomplete       (Incomplete  [O03.4])    Surgeons: Marcelo Valdez MD Provider: Jonny Reynoso MD    Anesthesia Type: general ASA Status: 2          Anesthesia Type: general    Vitals  Vitals Value Taken Time   /66 22 0846   Temp 36.3 °C (97.3 °F) 22 0845   Pulse 80 22 0850   Resp 14 22 0845   SpO2 95 % 22 0850   Vitals shown include unvalidated device data.        Post Anesthesia Care and Evaluation    Patient location during evaluation: PACU  Patient participation: complete - patient participated  Level of consciousness: awake and alert  Pain management: adequate  Airway patency: patent  Anesthetic complications: No anesthetic complications    Cardiovascular status: acceptable  Respiratory status: acceptable  Hydration status: acceptable    Comments: /80   Pulse 78   Temp 36.3 °C (97.3 °F) (Oral)   Resp 14   Ht 172.7 cm (68\")   Wt 112 kg (246 lb 6.4 oz)   LMP 2021   SpO2 96%   Breastfeeding No   BMI 37.46 kg/m²         "

## 2022-01-22 NOTE — ANESTHESIA PREPROCEDURE EVALUATION
Anesthesia Evaluation     no history of anesthetic complications:  NPO Solid Status: > 8 hours  NPO Liquid Status: > 2 hours           Airway   Mallampati: II  TM distance: >3 FB  Neck ROM: full  Dental - normal exam     Pulmonary - negative pulmonary ROS and normal exam     ROS comment: Covid symptoms on  01/05/22, tested pos on the 12th.  Cardiovascular - negative cardio ROS and normal exam    Rhythm: regular        Neuro/Psych- negative ROS  GI/Hepatic/Renal/Endo    (+) obesity,       Musculoskeletal     Abdominal    Substance History      OB/GYN    (+) Pregnant,     Comment: Missed AB      Other   blood dyscrasia anemia,                     Anesthesia Plan    ASA 2     general   (  )  intravenous induction     Anesthetic plan, all risks, benefits, and alternatives have been provided, discussed and informed consent has been obtained with: patient.    Plan discussed with CRNA.

## 2022-01-22 NOTE — ANESTHESIA PROCEDURE NOTES
Airway  Urgency: elective    Date/Time: 1/22/2022 7:36 AM  Airway not difficult    General Information and Staff    Patient location during procedure: OR  Anesthesiologist: Jonny Reynoso MD  CRNA: Adam Nicole CRNA    Indications and Patient Condition  Indications for airway management: airway protection    Preoxygenated: yes  MILS not maintained throughout  Mask difficulty assessment: 0 - not attempted    Final Airway Details  Final airway type: supraglottic airway      Successful airway: classic  Size 5    Number of attempts at approach: 1  Assessment: lips, teeth, and gum same as pre-op

## 2022-01-25 LAB
LAB AP CASE REPORT: NORMAL
LAB AP DIAGNOSIS COMMENT: NORMAL
PATH REPORT.FINAL DX SPEC: NORMAL
PATH REPORT.GROSS SPEC: NORMAL

## 2022-01-27 ENCOUNTER — OFFICE VISIT (OUTPATIENT)
Dept: OBSTETRICS AND GYNECOLOGY | Age: 32
End: 2022-01-27

## 2022-01-27 VITALS
BODY MASS INDEX: 36.68 KG/M2 | SYSTOLIC BLOOD PRESSURE: 108 MMHG | HEIGHT: 68 IN | WEIGHT: 242 LBS | DIASTOLIC BLOOD PRESSURE: 74 MMHG

## 2022-01-27 DIAGNOSIS — N96 RECURRENT PREGNANCY LOSS: ICD-10-CM

## 2022-01-27 DIAGNOSIS — Z98.890 POST-OPERATIVE STATE: Primary | ICD-10-CM

## 2022-01-27 DIAGNOSIS — D62 ANEMIA DUE TO ACUTE BLOOD LOSS: ICD-10-CM

## 2022-01-27 PROBLEM — O03.4 INCOMPLETE ABORTION: Status: RESOLVED | Noted: 2022-01-21 | Resolved: 2022-01-27

## 2022-01-27 PROCEDURE — 99024 POSTOP FOLLOW-UP VISIT: CPT | Performed by: OBSTETRICS & GYNECOLOGY

## 2022-01-27 NOTE — PROGRESS NOTES
"Subjective   Chief Complaint   Patient presents with   • Post-op     D&C     Marisa Case is a 31 y.o. year old  presenting to be seen for her post-operative visit.  Currently she reports no problems with eating, bowel movements, voiding, or wound drainage.  The patient had an incomplete AB.  She had heavy bleeding with anemia with hemoglobin of 9.  I did a D&C and there was a copious amount of tissue.  The tissue did not show any molar tissue.  She has had a previous small and 2 previous miscarriages.  She was between 7 to 10 weeks pregnant.    The pathology results from her procedure are in Marisa's record and are benign.      OTHER THINGS SHE WANTS TO DISCUSS TODAY:  Nothing else    The following portions of the patient's history were reviewed and updated as appropriate:current medications and allergies       Objective   /74   Ht 172.7 cm (68\")   Wt 110 kg (242 lb)   Breastfeeding No   BMI 36.80 kg/m²     General:  well developed; well nourished  no acute distress   Abdomen: Not performed.   Pelvis: Not performed.          Assessment   1. S/P suction D & C  2. Recurrent pregnancy loss-check labs today.  Recommend low-dose aspirin when attempting pregnancy  3. Anemia-continue iron and recheck today.  Also recheck in 2 months.     Plan   1. May return to full activity with no restrictions  2. The importance of keeping all planned follow-up and taking all medications as prescribed was emphasized.  3. Follow up for annual exam 1 year.    No orders of the defined types were placed in this encounter.             Note: Speech recognition transcription software may have been used to create portions of this document.  An attempt at proofreading has been made but errors in transcription could still be present.    "

## 2022-02-21 LAB
APTT HEX PL PPP: 0 SEC
APTT IMM NP PPP: NORMAL SEC
APTT PPP 1:1 SALINE: NORMAL SEC
APTT PPP: 25.8 SEC
B2 GLYCOPROT1 IGA SER-ACNC: <10 SAU
B2 GLYCOPROT1 IGG SER-ACNC: <10 SGU
B2 GLYCOPROT1 IGM SER-ACNC: <10 SMU
CARDIOLIPIN IGG SER IA-ACNC: <10 GPL
CARDIOLIPIN IGM SER IA-ACNC: 12 MPL
CELLS ANALYZED: 20
CELLS COUNTED: 20
CELLS KARYOTYPED.TOTAL BLD/T: 2
CLINICAL CYTOGENETICIST SPEC: NORMAL
CONFIRM DRVVT: NORMAL SEC
DIAGNOSTIC IMP SPEC-IMP: NORMAL
DRVVT SCREEN TO CONFIRM RATIO: NORMAL RATIO
ERYTHROCYTE [DISTWIDTH] IN BLOOD BY AUTOMATED COUNT: 12.9 % (ref 11.7–15.4)
HBA1C MFR BLD: 5.2 % (ref 4.8–5.6)
HCT VFR BLD AUTO: 32.8 % (ref 34–46.6)
HGB BLD-MCNC: 10.8 G/DL (ref 11.1–15.9)
INR PPP: 1 RATIO
ISCN BAND LEVEL QL: 500
KARYOTYP BLD/T: NORMAL
LABORATORY COMMENT REPORT: NORMAL
MCH RBC QN AUTO: 29.3 PG (ref 26.6–33)
MCHC RBC AUTO-ENTMCNC: 32.9 G/DL (ref 31.5–35.7)
MCV RBC AUTO: 89 FL (ref 79–97)
PLATELET # BLD AUTO: 416 X10E3/UL (ref 150–450)
PROTHROMBIN TIME: 10.9 SEC
RBC # BLD AUTO: 3.68 X10E6/UL (ref 3.77–5.28)
SCREEN DRVVT: 28.5 SEC
SPECIMEN SOURCE: NORMAL
THROMBIN TIME: 16.6 SEC
TSH SERPL DL<=0.005 MIU/L-ACNC: 1.19 UIU/ML (ref 0.45–4.5)
WBC # BLD AUTO: 5.4 X10E3/UL (ref 3.4–10.8)

## 2022-05-26 ENCOUNTER — OFFICE VISIT (OUTPATIENT)
Dept: OBSTETRICS AND GYNECOLOGY | Age: 32
End: 2022-05-26

## 2022-05-26 VITALS
BODY MASS INDEX: 37.59 KG/M2 | WEIGHT: 248 LBS | DIASTOLIC BLOOD PRESSURE: 74 MMHG | HEIGHT: 68 IN | SYSTOLIC BLOOD PRESSURE: 108 MMHG

## 2022-05-26 DIAGNOSIS — Z01.419 ENCOUNTER FOR GYNECOLOGICAL EXAMINATION WITHOUT ABNORMAL FINDING: Primary | ICD-10-CM

## 2022-05-26 DIAGNOSIS — Z12.4 SCREENING FOR MALIGNANT NEOPLASM OF THE CERVIX: ICD-10-CM

## 2022-05-26 DIAGNOSIS — Z11.51 SPECIAL SCREENING EXAMINATION FOR HUMAN PAPILLOMAVIRUS (HPV): ICD-10-CM

## 2022-05-26 PROCEDURE — 99395 PREV VISIT EST AGE 18-39: CPT | Performed by: OBSTETRICS & GYNECOLOGY

## 2022-05-26 NOTE — PROGRESS NOTES
"Subjective     Chief Complaint   Patient presents with   • Gynecologic Exam     AC       History of Present Illness    Marisa Case is a 31 y.o.  who presents for annual exam.  The patient is trying to conceive now.  She has had some recurrent pregnancy loss.  Her rubella was immune.  She is taking prenatal vitamins and tracking her cycles.  He is exercising regularly.  Her menses are regular every 28-30 days, lasting 4-7 days, dysmenorrhea mild, occurring first 1-2 days of flow   Obstetric History:  OB History        4    Para   1    Term   1       0    AB   3    Living   1       SAB   2    IAB   0    Ectopic   0    Molar   1    Multiple   0    Live Births   1               Menstrual History:     Patient's last menstrual period was 2022.         Current contraception: none  History of abnormal Pap smear: no  Received Gardasil immunization: yes  Perform regular self breast exam : yes  Family history of uterine or ovarian cancer: no  Family History of colon cancer: no  Family history of breast cancer: no    Mammogram: not indicated.  Colonoscopy: not indicated.  DEXA: not indicated.    Exercise: exercises 5 times a week  5 am beach body   Calcium/Vitamin D: adequate intake    The following portions of the patient's history were reviewed and updated as appropriate: allergies, current medications, past family history, past medical history, past social history, past surgical history and problem list.    Review of Systems    Review of Systems   Constitutional: Negative for fatigue.   Respiratory: Negative for shortness of breath.    Gastrointestinal: Negative for abdominal pain.   Genitourinary: Negative for dysuria.   Neurological: Negative for headaches.   Psychiatric/Behavioral: Negative for dysphoric mood.     Objective   Physical Exam    /74   Ht 172.7 cm (68\")   Wt 112 kg (248 lb)   LMP 2022   Breastfeeding No   BMI 37.71 kg/m²     General:   alert, appears stated " age and cooperative   Neck: thyroid normal to palpation   Heart: regular rate and rhythm   Lungs: clear to auscultation bilaterally   Abdomen: soft, non-tender, without masses or organomegaly   Breast: inspection negative, no nipple discharge or bleeding, no masses or nodularity palpable   Vulva: normal, Bartholin's, Urethra, Rachel's normal   Vagina: normal mucosa, normal discharge   Cervix: no cervical motion tenderness and no lesions   Uterus: non-tender, normal shape and consistency   Adnexa: no mass, fullness, tenderness   Rectal: not indicated     Assessment & Plan   Diagnoses and all orders for this visit:    1. Encounter for gynecological examination without abnormal finding (Primary)      Patient has good exercise habits.  She will call us if she has a positive pregnancy test  Continue prenatal vitamins.  All questions answered.  Breast self exam technique reviewed and patient encouraged to perform self-exam monthly.  Discussed healthy lifestyle modifications.  Recommended 30 minutes of aerobic exercise five times per week.  Discussed calcium needs to prevent osteoporosis.

## 2022-06-02 LAB
CYTOLOGIST CVX/VAG CYTO: NORMAL
CYTOLOGY CVX/VAG DOC CYTO: NORMAL
CYTOLOGY CVX/VAG DOC THIN PREP: NORMAL
DX ICD CODE: NORMAL
HIV 1 & 2 AB SER-IMP: NORMAL
HPV I/H RISK 4 DNA CVX QL PROBE+SIG AMP: NEGATIVE
Lab: NORMAL
OTHER STN SPEC: NORMAL
STAT OF ADQ CVX/VAG CYTO-IMP: NORMAL

## 2022-09-02 ENCOUNTER — TELEPHONE (OUTPATIENT)
Dept: OBSTETRICS AND GYNECOLOGY | Age: 32
End: 2022-09-02

## 2022-09-02 DIAGNOSIS — N92.6 IRREGULAR MENSES: Primary | ICD-10-CM

## 2022-09-02 DIAGNOSIS — Z32.01 POSITIVE URINE PREGNANCY TEST: ICD-10-CM

## 2022-09-02 NOTE — TELEPHONE ENCOUNTER
PT calls stating she took a postivie home urine pregnancy test today, she has a hx of molar pregnancy and miscarriage and states Dr Valdez likes to closely monitor her pregnancies. Pt will be back in town late tomorrow. LMP 08/04. Please advise if you want pt to come in for HCG check and if you want pt to come in sooner than 8-10weeks for pregnancy confirmation

## 2022-09-07 LAB — HCG INTACT+B SERPL-ACNC: 261 MIU/ML

## 2022-09-08 ENCOUNTER — CLINICAL SUPPORT (OUTPATIENT)
Dept: OBSTETRICS AND GYNECOLOGY | Age: 32
End: 2022-09-08

## 2022-09-09 LAB — HCG INTACT+B SERPL-ACNC: 833 MIU/ML

## 2022-09-19 ENCOUNTER — OFFICE VISIT (OUTPATIENT)
Dept: OBSTETRICS AND GYNECOLOGY | Age: 32
End: 2022-09-19

## 2022-09-19 VITALS
SYSTOLIC BLOOD PRESSURE: 118 MMHG | WEIGHT: 254 LBS | DIASTOLIC BLOOD PRESSURE: 74 MMHG | HEIGHT: 68 IN | BODY MASS INDEX: 38.49 KG/M2

## 2022-09-19 DIAGNOSIS — N96 RECURRENT PREGNANCY LOSS: Primary | ICD-10-CM

## 2022-09-19 PROCEDURE — 99212 OFFICE O/P EST SF 10 MIN: CPT | Performed by: OBSTETRICS & GYNECOLOGY

## 2022-09-19 NOTE — PROGRESS NOTES
"  Chief complaint-history of approximately 7 to 8-week miscarriage and history of molar pregnancy.    History of present illness- Patient is a 31 y.o.  who has a history of molar pregnancy and miscarriage.  Patient is here for viability ultrasound.  Her beta hCGs were rising normally.       /74   Ht 172.7 cm (68\")   Wt 115 kg (254 lb)   LMP 2022   Breastfeeding No   BMI 38.62 kg/m²   OBGyn Exam    Pelvic ultrasound shows viable intrauterine pregnancy with heart rate of 159.  Dates agree.  Crown-rump length is 6 weeks 3 days.  LMP due date of 2023 will be used    Diagnoses and all orders for this visit:    1. Recurrent pregnancy loss (Primary)  -     Progesterone    Patient has history of molar pregnancy which is not seen on ultrasound today.  She does have a history of early miscarriage so we will check progesterone today.  Repeat viability ultrasound in 2 weeks.  "

## 2022-09-20 LAB — PROGEST SERPL-MCNC: 8.3 NG/ML

## 2022-09-20 RX ORDER — PROGESTERONE 200 MG/1
200 CAPSULE ORAL
Qty: 30 CAPSULE | Refills: 0 | Status: SHIPPED | OUTPATIENT
Start: 2022-09-20 | End: 2022-11-14

## 2022-10-03 ENCOUNTER — OFFICE VISIT (OUTPATIENT)
Dept: OBSTETRICS AND GYNECOLOGY | Age: 32
End: 2022-10-03

## 2022-10-03 VITALS
WEIGHT: 251 LBS | HEIGHT: 68 IN | DIASTOLIC BLOOD PRESSURE: 72 MMHG | BODY MASS INDEX: 38.04 KG/M2 | SYSTOLIC BLOOD PRESSURE: 126 MMHG

## 2022-10-03 DIAGNOSIS — N96 RECURRENT PREGNANCY LOSS: ICD-10-CM

## 2022-10-03 DIAGNOSIS — R79.89 LOW SERUM PROGESTERONE: Primary | ICD-10-CM

## 2022-10-03 PROCEDURE — 99213 OFFICE O/P EST LOW 20 MIN: CPT | Performed by: OBSTETRICS & GYNECOLOGY

## 2022-10-03 NOTE — PROGRESS NOTES
"  Chief complaint-low progesterone    History of present illness- Patient is a 32 y.o.  who is here for follow-up.  Her progesterone level was low and she has history of miscarriage and molar pregnancy.  She is having some nausea.  She was wondering about getting her COVID and flu vaccination.   Patient is doing frequent small meals and taking her prenatal at bedtime.  She has started the vaginal progesterone.  No vaginal bleeding.          /72   Ht 172.7 cm (68\")   Wt 114 kg (251 lb)   LMP 2022   Breastfeeding No   BMI 38.16 kg/m²   Physical Exam  Constitutional:       Appearance: Normal appearance.   Neurological:      Mental Status: She is alert.   Psychiatric:         Mood and Affect: Mood normal.         Thought Content: Thought content normal.         Judgment: Judgment normal.         Pelvic ultrasound shows viable intrauterine pregnancy.  Crown-rump length of 8 weeks 3 days.  Cardiac activity at 167 bpm.  Kahn pregnancy.  Dates agree.  EDC by LMP of May/11/23    Diagnoses and all orders for this visit:    1. Low serum progesterone (Primary)    2. Recurrent pregnancy loss    We will continue the progesterone till about 11 weeks of pregnancy  Return for new OB appointment in 2 weeks patient does want to do genetic testing  We discussed how to deal with nausea in pregnancy and handouts given.  Patient does not need medication at this time  We discussed flu vaccination and COVID vaccination can be given at any time in pregnancy.  Patient would like to wait at least a couple of weeks.  She did have COVID in January.    "

## 2022-10-11 ENCOUNTER — TELEPHONE (OUTPATIENT)
Dept: OBSTETRICS AND GYNECOLOGY | Age: 32
End: 2022-10-11

## 2022-10-11 RX ORDER — DOXYLAMINE SUCCINATE AND PYRIDOXINE HYDROCHLORIDE, DELAYED RELEASE TABLETS 10 MG/10 MG 10; 10 MG/1; MG/1
TABLET, DELAYED RELEASE ORAL
Qty: 100 TABLET | Refills: 3 | Status: SHIPPED | OUTPATIENT
Start: 2022-10-11 | End: 2022-11-14

## 2022-10-11 NOTE — TELEPHONE ENCOUNTER
Pt called c/o nausea and vomiting. She stated she had trouble keeping fluids down yesterday but was finally able to last night, she is requesting a rx. Please advise.

## 2022-10-11 NOTE — TELEPHONE ENCOUNTER
I talked to the patient and we will start take leaches.  Also recommend frequent small meals.  If this is not effective she will call back and we will call in Phenergan.  Advised ER if unable to keep down fluids.

## 2022-10-18 ENCOUNTER — INITIAL PRENATAL (OUTPATIENT)
Dept: OBSTETRICS AND GYNECOLOGY | Age: 32
End: 2022-10-18

## 2022-10-18 VITALS — BODY MASS INDEX: 38.16 KG/M2 | SYSTOLIC BLOOD PRESSURE: 124 MMHG | DIASTOLIC BLOOD PRESSURE: 80 MMHG | WEIGHT: 251 LBS

## 2022-10-18 DIAGNOSIS — Z34.81 ENCOUNTER FOR SUPERVISION OF OTHER NORMAL PREGNANCY IN FIRST TRIMESTER: ICD-10-CM

## 2022-10-18 DIAGNOSIS — Z13.89 SCREENING FOR BLOOD OR PROTEIN IN URINE: Primary | ICD-10-CM

## 2022-10-18 PROBLEM — O26.899 RH NEGATIVE STATUS DURING PREGNANCY: Status: ACTIVE | Noted: 2022-10-18

## 2022-10-18 PROBLEM — Z67.91 RH NEGATIVE STATUS DURING PREGNANCY: Status: ACTIVE | Noted: 2022-10-18

## 2022-10-18 LAB
GLUCOSE UR STRIP-MCNC: NEGATIVE MG/DL
PROT UR STRIP-MCNC: NEGATIVE MG/DL
VZV IGG SER QL: NORMAL

## 2022-10-18 PROCEDURE — 90686 IIV4 VACC NO PRSV 0.5 ML IM: CPT | Performed by: OBSTETRICS & GYNECOLOGY

## 2022-10-18 PROCEDURE — 90471 IMMUNIZATION ADMIN: CPT | Performed by: OBSTETRICS & GYNECOLOGY

## 2022-10-18 PROCEDURE — 0501F PRENATAL FLOW SHEET: CPT | Performed by: OBSTETRICS & GYNECOLOGY

## 2022-10-18 NOTE — PROGRESS NOTES
Patient is a 32-year-old  5 para 1-0-3-1 at 10 weeks 5 days by LMP consistent with ultrasound.  Patient has a history of miscarriage x2 and molar pregnancy.  Patient has been on progesterone which she will complete at 11 weeks.  Patient has no complaints today.  Her son was a vaginal delivery at 40 weeks and 2 days.  Patient was induced and had cervical ripening.    Full history is reviewed.    Exam-see exam tab.  Hand-held ultrasound was used today to visualize cardiac activity and fetal movement    Assessment- 10 weeks 5 days  Genetic testing-patient desires cell free DNA which was sent off today.  Previous carrier testing was negative.  Rh-  New OB labs today  History of recurrent pregnancy loss- patient will complete her progesterone and stop at 11 weeks.  Obesity-plan to do weekly BPP starting at 36 weeks.  New OB information reviewed.

## 2022-10-19 ENCOUNTER — TELEPHONE (OUTPATIENT)
Dept: OBSTETRICS AND GYNECOLOGY | Age: 32
End: 2022-10-19

## 2022-10-19 PROBLEM — U07.1 COVID: Status: ACTIVE | Noted: 2022-10-19

## 2022-10-19 LAB
ABO GROUP BLD: NORMAL
BASOPHILS # BLD AUTO: 0 X10E3/UL (ref 0–0.2)
BASOPHILS NFR BLD AUTO: 0 %
BLD GP AB SCN SERPL QL: NEGATIVE
EOSINOPHIL # BLD AUTO: 0 X10E3/UL (ref 0–0.4)
EOSINOPHIL NFR BLD AUTO: 1 %
ERYTHROCYTE [DISTWIDTH] IN BLOOD BY AUTOMATED COUNT: 13 % (ref 11.7–15.4)
HBV SURFACE AG SERPL QL IA: NEGATIVE
HCT VFR BLD AUTO: 41.8 % (ref 34–46.6)
HCV AB S/CO SERPL IA: <0.1 S/CO RATIO (ref 0–0.9)
HGB BLD-MCNC: 14 G/DL (ref 11.1–15.9)
HIV 1+2 AB+HIV1 P24 AG SERPL QL IA: NON REACTIVE
IMM GRANULOCYTES # BLD AUTO: 0 X10E3/UL (ref 0–0.1)
IMM GRANULOCYTES NFR BLD AUTO: 0 %
LYMPHOCYTES # BLD AUTO: 1 X10E3/UL (ref 0.7–3.1)
LYMPHOCYTES NFR BLD AUTO: 14 %
MCH RBC QN AUTO: 30.7 PG (ref 26.6–33)
MCHC RBC AUTO-ENTMCNC: 33.5 G/DL (ref 31.5–35.7)
MCV RBC AUTO: 92 FL (ref 79–97)
MONOCYTES # BLD AUTO: 0.6 X10E3/UL (ref 0.1–0.9)
MONOCYTES NFR BLD AUTO: 9 %
NEUTROPHILS # BLD AUTO: 5.4 X10E3/UL (ref 1.4–7)
NEUTROPHILS NFR BLD AUTO: 76 %
PLATELET # BLD AUTO: 273 X10E3/UL (ref 150–450)
RBC # BLD AUTO: 4.56 X10E6/UL (ref 3.77–5.28)
RH BLD: NEGATIVE
RPR SER QL: NON REACTIVE
RUBV IGG SERPL IA-ACNC: 5.71 INDEX
WBC # BLD AUTO: 7.1 X10E3/UL (ref 3.4–10.8)

## 2022-10-19 RX ORDER — ASPIRIN 81 MG/1
81 TABLET ORAL DAILY
Qty: 30 TABLET | Refills: 6 | Status: SHIPPED | OUTPATIENT
Start: 2022-10-19

## 2022-10-19 NOTE — TELEPHONE ENCOUNTER
Pt calls 10w6d states she and her  tested positive for covid19 late last night. Sx onset today, congestion. Denies any other sx. Pt notified of pregnancy safe medication to take for any cold symptoms and to proceed to the ER if having any shortness of breath.

## 2022-10-19 NOTE — TELEPHONE ENCOUNTER
I talked to the patient.  She and her  both had COVID.  She has mild nasal congestion.  We discussed pushing fluids and taking Tylenol.  She will come to the emergency room if she has any shortness of breath or other concerns.  We did discuss increased risk of clotting in the form of DVT or pulmonary embolism.  Recommend starting low-dose aspirin through the pregnancy.  I will send in 81 mg aspirin to the patient's pharmacy.

## 2022-10-20 LAB
BACTERIA UR CULT: NORMAL
BACTERIA UR CULT: NORMAL

## 2022-11-14 ENCOUNTER — ROUTINE PRENATAL (OUTPATIENT)
Dept: OBSTETRICS AND GYNECOLOGY | Age: 32
End: 2022-11-14

## 2022-11-14 VITALS — WEIGHT: 251 LBS | SYSTOLIC BLOOD PRESSURE: 108 MMHG | BODY MASS INDEX: 38.16 KG/M2 | DIASTOLIC BLOOD PRESSURE: 74 MMHG

## 2022-11-14 DIAGNOSIS — Z34.82 ENCOUNTER FOR SUPERVISION OF OTHER NORMAL PREGNANCY IN SECOND TRIMESTER: ICD-10-CM

## 2022-11-14 DIAGNOSIS — E66.09 CLASS 2 OBESITY DUE TO EXCESS CALORIES WITHOUT SERIOUS COMORBIDITY WITH BODY MASS INDEX (BMI) OF 37.0 TO 37.9 IN ADULT: ICD-10-CM

## 2022-11-14 DIAGNOSIS — Z67.91 RH NEGATIVE STATUS DURING PREGNANCY IN SECOND TRIMESTER: ICD-10-CM

## 2022-11-14 DIAGNOSIS — Z13.89 SCREENING FOR BLOOD OR PROTEIN IN URINE: Primary | ICD-10-CM

## 2022-11-14 DIAGNOSIS — O26.892 RH NEGATIVE STATUS DURING PREGNANCY IN SECOND TRIMESTER: ICD-10-CM

## 2022-11-14 PROBLEM — D62 ANEMIA DUE TO ACUTE BLOOD LOSS: Status: RESOLVED | Noted: 2022-01-27 | Resolved: 2022-11-14

## 2022-11-14 LAB
GLUCOSE UR STRIP-MCNC: NEGATIVE MG/DL
PROT UR STRIP-MCNC: NEGATIVE MG/DL

## 2022-11-14 PROCEDURE — 0502F SUBSEQUENT PRENATAL CARE: CPT | Performed by: OBSTETRICS & GYNECOLOGY

## 2022-11-14 NOTE — PROGRESS NOTES
Patient cell free DNA is normal.  Baby is a girl.  She has no complaints.  She stopped her progesterone and is taking her iron.  Her nausea vomiting is improved.    Hand-held ultrasound shows cardiac activity.  New OB labs are normal  Cell free DNA was normal.  Weight is stable.    Assessment-14 weeks  Follow-up in 4 weeks for AFP test  Schedule ultrasound for anatomy at 20 weeks  Rh-  Obesity-plan to start BPP's at 36 weeks.

## 2022-12-12 ENCOUNTER — ROUTINE PRENATAL (OUTPATIENT)
Dept: OBSTETRICS AND GYNECOLOGY | Age: 32
End: 2022-12-12

## 2022-12-12 VITALS — DIASTOLIC BLOOD PRESSURE: 74 MMHG | WEIGHT: 250 LBS | BODY MASS INDEX: 38.01 KG/M2 | SYSTOLIC BLOOD PRESSURE: 112 MMHG

## 2022-12-12 DIAGNOSIS — O26.899 RH NEGATIVE, ANTEPARTUM: ICD-10-CM

## 2022-12-12 DIAGNOSIS — Z34.90 PREGNANCY, UNSPECIFIED GESTATIONAL AGE: ICD-10-CM

## 2022-12-12 DIAGNOSIS — Z67.91 RH NEGATIVE, ANTEPARTUM: ICD-10-CM

## 2022-12-12 DIAGNOSIS — E66.09 CLASS 2 OBESITY DUE TO EXCESS CALORIES WITHOUT SERIOUS COMORBIDITY WITH BODY MASS INDEX (BMI) OF 37.0 TO 37.9 IN ADULT: ICD-10-CM

## 2022-12-12 DIAGNOSIS — Z13.89 SCREENING FOR BLOOD OR PROTEIN IN URINE: Primary | ICD-10-CM

## 2022-12-12 LAB
GLUCOSE UR STRIP-MCNC: NEGATIVE MG/DL
PROT UR STRIP-MCNC: NEGATIVE MG/DL

## 2022-12-12 PROCEDURE — 0502F SUBSEQUENT PRENATAL CARE: CPT | Performed by: OBSTETRICS & GYNECOLOGY

## 2022-12-12 NOTE — PROGRESS NOTES
The patient is feeling good fetal movements.  She is somewhat tired taking care of her 2-year-old.  She has had some reflux.    Hand-held ultrasound is used to visualize cardiac activity and fetal movement  Doppler heart tones are heard  Weight loss of 1 pound since last visit  Blood pressure 112/74 with no protein.    Assessment-18 weeks  Anatomy ultrasound in 2 weeks  Rh--plan for RhoGAM at 28 weeks  Obesity plan to start BPP's at 36 weeks

## 2022-12-14 LAB
AFP INTERP SERPL-IMP: NORMAL
AFP INTERP SERPL-IMP: NORMAL
AFP MOM SERPL: 0.91
AFP SERPL-MCNC: 33 NG/ML
AGE AT DELIVERY: 33.6 YR
GA METHOD: NORMAL
GA: 18.6 WEEKS
IDDM PATIENT QL: NO
LABORATORY COMMENT REPORT: NORMAL
MULTIPLE PREGNANCY: NO
NEURAL TUBE DEFECT RISK FETUS: NORMAL %
RESULT: NORMAL

## 2022-12-22 ENCOUNTER — ROUTINE PRENATAL (OUTPATIENT)
Dept: OBSTETRICS AND GYNECOLOGY | Age: 32
End: 2022-12-22

## 2022-12-22 VITALS — DIASTOLIC BLOOD PRESSURE: 74 MMHG | WEIGHT: 250 LBS | BODY MASS INDEX: 38.01 KG/M2 | SYSTOLIC BLOOD PRESSURE: 108 MMHG

## 2022-12-22 DIAGNOSIS — Z13.89 SCREENING FOR BLOOD OR PROTEIN IN URINE: Primary | ICD-10-CM

## 2022-12-22 DIAGNOSIS — Z67.91 RH NEGATIVE, ANTEPARTUM: ICD-10-CM

## 2022-12-22 DIAGNOSIS — O26.899 RH NEGATIVE, ANTEPARTUM: ICD-10-CM

## 2022-12-22 DIAGNOSIS — E66.09 CLASS 2 OBESITY DUE TO EXCESS CALORIES WITHOUT SERIOUS COMORBIDITY WITH BODY MASS INDEX (BMI) OF 37.0 TO 37.9 IN ADULT: ICD-10-CM

## 2022-12-22 LAB
GLUCOSE UR STRIP-MCNC: NEGATIVE MG/DL
PROT UR STRIP-MCNC: ABNORMAL MG/DL

## 2022-12-22 PROCEDURE — 0502F SUBSEQUENT PRENATAL CARE: CPT | Performed by: OBSTETRICS & GYNECOLOGY

## 2022-12-22 NOTE — PROGRESS NOTES
Patient is here today for anatomy ultrasound.  She has no complaints.    Anatomy ultrasound shows normal anatomy within the limits of ultrasound.  Baby is breech.  Baby is a girl.  Placenta is anterior with no previa.  Cervical length is normal  Blood pressure 108/74 with trace protein  Weight loss of 5 pounds.    Assessment-20 weeks  Normal anatomy ultrasound today  Rh--plan for RhoGAM at 28 weeks  Obesity-plan to start BPP's at 36 weeks.

## 2023-01-23 ENCOUNTER — ROUTINE PRENATAL (OUTPATIENT)
Dept: OBSTETRICS AND GYNECOLOGY | Age: 33
End: 2023-01-23
Payer: COMMERCIAL

## 2023-01-23 VITALS — SYSTOLIC BLOOD PRESSURE: 108 MMHG | DIASTOLIC BLOOD PRESSURE: 74 MMHG | WEIGHT: 252 LBS | BODY MASS INDEX: 38.32 KG/M2

## 2023-01-23 DIAGNOSIS — Z67.91 RH NEGATIVE STATUS DURING PREGNANCY IN SECOND TRIMESTER: ICD-10-CM

## 2023-01-23 DIAGNOSIS — E66.09 CLASS 2 OBESITY DUE TO EXCESS CALORIES WITHOUT SERIOUS COMORBIDITY WITH BODY MASS INDEX (BMI) OF 37.0 TO 37.9 IN ADULT: ICD-10-CM

## 2023-01-23 DIAGNOSIS — Z13.89 SCREENING FOR BLOOD OR PROTEIN IN URINE: Primary | ICD-10-CM

## 2023-01-23 DIAGNOSIS — O26.892 RH NEGATIVE STATUS DURING PREGNANCY IN SECOND TRIMESTER: ICD-10-CM

## 2023-01-23 DIAGNOSIS — U07.1 COVID: ICD-10-CM

## 2023-01-23 LAB
GLUCOSE UR STRIP-MCNC: NEGATIVE MG/DL
PROT UR STRIP-MCNC: NEGATIVE MG/DL

## 2023-01-23 PROCEDURE — 0502F SUBSEQUENT PRENATAL CARE: CPT | Performed by: OBSTETRICS & GYNECOLOGY

## 2023-01-23 NOTE — PROGRESS NOTES
Patient is feeling well.  She is feeling fetal movements.  She and her  are going to buy his grandmother's house.  She recently passed away.    Doppler tones are positive and fundal height is just slightly elevated  Blood pressure 108/74 no protein  3 pound weight loss for pregnancy.    Assessment-24 weeks  Obesity- check fetal weight at 32 weeks and start BPP's at 36 weeks.  Continue low-dose aspirin.  Rh--plan for RhoGAM at 28 weeks  Follow-up in 4 weeks.

## 2023-01-25 ENCOUNTER — TELEPHONE (OUTPATIENT)
Dept: OBSTETRICS AND GYNECOLOGY | Age: 33
End: 2023-01-25
Payer: COMMERCIAL

## 2023-01-25 NOTE — TELEPHONE ENCOUNTER
"Provider: DR. PAPPAS  Caller: NATALIA LIMA  Relationship to Patient: SELF  Phone Number: [phone]    PT JUST PICKED UP HER FMLA FROM THE OFFICE FOR HER PREGNANCY - PT REALIZED ONCE SHE GOT HOME THAT THE PAPERS ARE FILLED OUT FOR DR VISITS ONLY AND NOT AFTER BIRTH  - PT NEEDS THE FMLA PAPER TO BE FOR \"AFTER\" BIRTH     PLEASE CALL THE PT ANYTIME - LVM IF NEEDED TO [place] KNOW THAT THE FMLA PAPERS CAN BE CORRECTED SO SHE CAN TURN THEM IN TO HER EMPLOYERS HR DEPT    THANK YOU!      "

## 2023-02-20 ENCOUNTER — ROUTINE PRENATAL (OUTPATIENT)
Dept: OBSTETRICS AND GYNECOLOGY | Age: 33
End: 2023-02-20
Payer: COMMERCIAL

## 2023-02-20 VITALS — DIASTOLIC BLOOD PRESSURE: 84 MMHG | BODY MASS INDEX: 38.62 KG/M2 | SYSTOLIC BLOOD PRESSURE: 126 MMHG | WEIGHT: 254 LBS

## 2023-02-20 DIAGNOSIS — Z67.91 RH NEGATIVE STATUS DURING PREGNANCY IN SECOND TRIMESTER: ICD-10-CM

## 2023-02-20 DIAGNOSIS — Z13.1 SCREENING FOR DIABETES MELLITUS: ICD-10-CM

## 2023-02-20 DIAGNOSIS — Z34.90 PREGNANCY, UNSPECIFIED GESTATIONAL AGE: ICD-10-CM

## 2023-02-20 DIAGNOSIS — Z13.89 SCREENING FOR BLOOD OR PROTEIN IN URINE: Primary | ICD-10-CM

## 2023-02-20 DIAGNOSIS — Z13.0 SCREENING FOR IRON DEFICIENCY ANEMIA: ICD-10-CM

## 2023-02-20 DIAGNOSIS — O26.892 RH NEGATIVE STATUS DURING PREGNANCY IN SECOND TRIMESTER: ICD-10-CM

## 2023-02-20 DIAGNOSIS — E66.09 CLASS 2 OBESITY DUE TO EXCESS CALORIES WITHOUT SERIOUS COMORBIDITY WITH BODY MASS INDEX (BMI) OF 37.0 TO 37.9 IN ADULT: ICD-10-CM

## 2023-02-20 LAB
GLUCOSE UR STRIP-MCNC: NEGATIVE MG/DL
PROT UR STRIP-MCNC: NEGATIVE MG/DL

## 2023-02-20 PROCEDURE — 0502F SUBSEQUENT PRENATAL CARE: CPT | Performed by: OBSTETRICS & GYNECOLOGY

## 2023-02-20 PROCEDURE — 90471 IMMUNIZATION ADMIN: CPT | Performed by: OBSTETRICS & GYNECOLOGY

## 2023-02-20 PROCEDURE — 90715 TDAP VACCINE 7 YRS/> IM: CPT | Performed by: OBSTETRICS & GYNECOLOGY

## 2023-02-20 PROCEDURE — 96372 THER/PROPH/DIAG INJ SC/IM: CPT | Performed by: OBSTETRICS & GYNECOLOGY

## 2023-02-20 NOTE — PROGRESS NOTES
Patient is feeling good fetal movements.  No complaints.  They are moving to a new house.    Doppler tones are positive and fundal height is appropriate  Blood pressure 126/84 with no protein  Weight loss of 1 pound for pregnancy.    Assessment-28 weeks  Third trimester labs and Tdap today  Patient is Rh-.  Check antibody screen and give RhoGAM today  Obesity-check fetal weight at 32 weeks and start BPP's at 36 weeks  Recommend kick counts.

## 2023-02-21 PROBLEM — O99.019 MATERNAL ANEMIA IN PREGNANCY, ANTEPARTUM: Status: ACTIVE | Noted: 2023-02-21

## 2023-02-21 LAB
BLD GP AB SCN SERPL QL: NEGATIVE
ERYTHROCYTE [DISTWIDTH] IN BLOOD BY AUTOMATED COUNT: 13.1 % (ref 11.7–15.4)
GLUCOSE 1H P 50 G GLC PO SERPL-MCNC: 100 MG/DL (ref 70–139)
HCT VFR BLD AUTO: 31.7 % (ref 34–46.6)
HGB BLD-MCNC: 10.7 G/DL (ref 11.1–15.9)
MCH RBC QN AUTO: 31.5 PG (ref 26.6–33)
MCHC RBC AUTO-ENTMCNC: 33.8 G/DL (ref 31.5–35.7)
MCV RBC AUTO: 93 FL (ref 79–97)
PLATELET # BLD AUTO: 271 X10E3/UL (ref 150–450)
RBC # BLD AUTO: 3.4 X10E6/UL (ref 3.77–5.28)
WBC # BLD AUTO: 8.2 X10E3/UL (ref 3.4–10.8)

## 2023-02-21 RX ORDER — FERROUS SULFATE 325(65) MG
325 TABLET ORAL DAILY
Qty: 30 TABLET | Refills: 3 | Status: SHIPPED | OUTPATIENT
Start: 2023-02-21

## 2023-03-07 ENCOUNTER — ROUTINE PRENATAL (OUTPATIENT)
Dept: OBSTETRICS AND GYNECOLOGY | Age: 33
End: 2023-03-07
Payer: COMMERCIAL

## 2023-03-07 VITALS — SYSTOLIC BLOOD PRESSURE: 120 MMHG | DIASTOLIC BLOOD PRESSURE: 70 MMHG | WEIGHT: 249 LBS | BODY MASS INDEX: 37.86 KG/M2

## 2023-03-07 DIAGNOSIS — E66.09 CLASS 2 OBESITY DUE TO EXCESS CALORIES WITHOUT SERIOUS COMORBIDITY WITH BODY MASS INDEX (BMI) OF 37.0 TO 37.9 IN ADULT: ICD-10-CM

## 2023-03-07 DIAGNOSIS — Z67.91 RH NEGATIVE, ANTEPARTUM: ICD-10-CM

## 2023-03-07 DIAGNOSIS — O99.019 MATERNAL ANEMIA IN PREGNANCY, ANTEPARTUM: ICD-10-CM

## 2023-03-07 DIAGNOSIS — Z13.89 SCREENING FOR BLOOD OR PROTEIN IN URINE: Primary | ICD-10-CM

## 2023-03-07 DIAGNOSIS — O26.899 RH NEGATIVE, ANTEPARTUM: ICD-10-CM

## 2023-03-07 LAB
GLUCOSE UR STRIP-MCNC: NEGATIVE MG/DL
PROT UR STRIP-MCNC: NEGATIVE MG/DL

## 2023-03-07 PROCEDURE — 0502F SUBSEQUENT PRENATAL CARE: CPT | Performed by: OBSTETRICS & GYNECOLOGY

## 2023-03-07 NOTE — PROGRESS NOTES
Patient is feeling good fetal movements.  Baby is active.  No complaints.    Blood pressure 120/70 with no protein  Doppler heart tones are positive and fundal height is appropriate  Third trimester labs showed anemia with hemoglobin 10.7.    Assessment-30 weeks  Anemia with hemoglobin 10.7-continue iron  Rh--patient is received RhoGAM  Obesity-check fetal weight at 32 weeks and start BPP's at 36 weeks.  Recommend kick counts

## 2023-03-20 ENCOUNTER — ROUTINE PRENATAL (OUTPATIENT)
Dept: OBSTETRICS AND GYNECOLOGY | Age: 33
End: 2023-03-20
Payer: COMMERCIAL

## 2023-03-20 VITALS — WEIGHT: 252 LBS | DIASTOLIC BLOOD PRESSURE: 78 MMHG | BODY MASS INDEX: 38.32 KG/M2 | SYSTOLIC BLOOD PRESSURE: 120 MMHG

## 2023-03-20 DIAGNOSIS — Z13.89 SCREENING FOR BLOOD OR PROTEIN IN URINE: Primary | ICD-10-CM

## 2023-03-20 DIAGNOSIS — E66.09 CLASS 2 OBESITY DUE TO EXCESS CALORIES WITHOUT SERIOUS COMORBIDITY WITH BODY MASS INDEX (BMI) OF 37.0 TO 37.9 IN ADULT: ICD-10-CM

## 2023-03-20 DIAGNOSIS — O26.899 RH NEGATIVE, ANTEPARTUM: ICD-10-CM

## 2023-03-20 DIAGNOSIS — O99.019 MATERNAL ANEMIA IN PREGNANCY, ANTEPARTUM: ICD-10-CM

## 2023-03-20 DIAGNOSIS — Z67.91 RH NEGATIVE, ANTEPARTUM: ICD-10-CM

## 2023-03-20 LAB
GLUCOSE UR STRIP-MCNC: NEGATIVE MG/DL
PROT UR STRIP-MCNC: NEGATIVE MG/DL

## 2023-03-20 PROCEDURE — 0502F SUBSEQUENT PRENATAL CARE: CPT | Performed by: OBSTETRICS & GYNECOLOGY

## 2023-03-20 NOTE — PROGRESS NOTES
Patient is feeling well.  Good fetal movements.    Ultrasound shows an estimated fetal weight of 5 pounds at the 76 percentile.  Abdominal circumference at the 77th percentile  Baby is vertex KATIE is normal at 14.  Blood pressure 120/78 with no protein    Assessment-32 weeks  Obesity-normal fetal weight today.  Start BPP's at 36 weeks.  Recommend kick counts  Rh--patient received RhoGAM  Anemia with hemoglobin of 10.9-continue iron.

## 2023-03-30 ENCOUNTER — HOSPITAL ENCOUNTER (EMERGENCY)
Facility: HOSPITAL | Age: 33
Discharge: HOME | End: 2023-03-30
Attending: OBSTETRICS & GYNECOLOGY | Admitting: OBSTETRICS & GYNECOLOGY
Payer: COMMERCIAL

## 2023-03-30 VITALS
HEIGHT: 68 IN | TEMPERATURE: 98.1 F | DIASTOLIC BLOOD PRESSURE: 70 MMHG | SYSTOLIC BLOOD PRESSURE: 115 MMHG | BODY MASS INDEX: 38.32 KG/M2 | HEART RATE: 91 BPM | RESPIRATION RATE: 16 BRPM

## 2023-03-30 LAB
BASOPHILS # BLD AUTO: 0.03 10*3/MM3 (ref 0–0.2)
BASOPHILS NFR BLD AUTO: 0.3 % (ref 0–1.5)
BILIRUB UR QL STRIP: NEGATIVE
CLARITY UR: ABNORMAL
COLOR UR: YELLOW
DEPRECATED RDW RBC AUTO: 44.8 FL (ref 37–54)
EOSINOPHIL # BLD AUTO: 0.04 10*3/MM3 (ref 0–0.4)
EOSINOPHIL NFR BLD AUTO: 0.4 % (ref 0.3–6.2)
ERYTHROCYTE [DISTWIDTH] IN BLOOD BY AUTOMATED COUNT: 13.2 % (ref 12.3–15.4)
FETAL RBC/RBC BLD FC-RTO: 0 %
FIBRINOGEN PPP-MCNC: 539 MG/DL (ref 219–464)
GLUCOSE UR STRIP-MCNC: NEGATIVE MG/DL
HCT VFR BLD AUTO: 34.3 % (ref 34–46.6)
HGB BLD-MCNC: 11.6 G/DL (ref 12–15.9)
HGB F BLD QL KLEIH BETKE: NORMAL
HGB UR QL STRIP.AUTO: NEGATIVE
IMM GRANULOCYTES # BLD AUTO: 0.06 10*3/MM3 (ref 0–0.05)
IMM GRANULOCYTES NFR BLD AUTO: 0.6 % (ref 0–0.5)
KETONES UR QL STRIP: ABNORMAL
LEUKOCYTE ESTERASE UR QL STRIP.AUTO: NEGATIVE
LYMPHOCYTES # BLD AUTO: 1.15 10*3/MM3 (ref 0.7–3.1)
LYMPHOCYTES NFR BLD AUTO: 11.9 % (ref 19.6–45.3)
MCH RBC QN AUTO: 31.4 PG (ref 26.6–33)
MCHC RBC AUTO-ENTMCNC: 33.8 G/DL (ref 31.5–35.7)
MCV RBC AUTO: 92.7 FL (ref 79–97)
MONOCYTES # BLD AUTO: 0.5 10*3/MM3 (ref 0.1–0.9)
MONOCYTES NFR BLD AUTO: 5.2 % (ref 5–12)
NEUTROPHILS NFR BLD AUTO: 7.88 10*3/MM3 (ref 1.7–7)
NEUTROPHILS NFR BLD AUTO: 81.6 % (ref 42.7–76)
NITRITE UR QL STRIP: NEGATIVE
NRBC BLD AUTO-RTO: 0 /100 WBC (ref 0–0.2)
PH UR STRIP.AUTO: 7 [PH] (ref 5–8)
PLATELET # BLD AUTO: 220 10*3/MM3 (ref 140–450)
PMV BLD AUTO: 9.4 FL (ref 6–12)
PROT UR QL STRIP: NEGATIVE
RBC # BLD AUTO: 3.7 10*6/MM3 (ref 3.77–5.28)
SP GR UR STRIP: 1.01 (ref 1–1.03)
UROBILINOGEN UR QL STRIP: ABNORMAL
WBC NRBC COR # BLD: 9.66 10*3/MM3 (ref 3.4–10.8)

## 2023-03-30 PROCEDURE — 81003 URINALYSIS AUTO W/O SCOPE: CPT | Performed by: OBSTETRICS & GYNECOLOGY

## 2023-03-30 PROCEDURE — 85025 COMPLETE CBC W/AUTO DIFF WBC: CPT | Performed by: OBSTETRICS & GYNECOLOGY

## 2023-03-30 PROCEDURE — 99283 EMERGENCY DEPT VISIT LOW MDM: CPT | Performed by: OBSTETRICS & GYNECOLOGY

## 2023-03-30 PROCEDURE — 85460 HEMOGLOBIN FETAL: CPT | Performed by: OBSTETRICS & GYNECOLOGY

## 2023-03-30 PROCEDURE — 59025 FETAL NON-STRESS TEST: CPT

## 2023-03-30 PROCEDURE — 87086 URINE CULTURE/COLONY COUNT: CPT | Performed by: OBSTETRICS & GYNECOLOGY

## 2023-03-30 PROCEDURE — 85384 FIBRINOGEN ACTIVITY: CPT | Performed by: OBSTETRICS & GYNECOLOGY
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
## 2023-03-31 LAB — BACTERIA SPEC AEROBE CULT: NORMAL

## 2023-04-03 ENCOUNTER — ROUTINE PRENATAL (OUTPATIENT)
Dept: OBSTETRICS AND GYNECOLOGY | Age: 33
End: 2023-04-03
Payer: COMMERCIAL

## 2023-04-03 VITALS — WEIGHT: 250 LBS | DIASTOLIC BLOOD PRESSURE: 76 MMHG | BODY MASS INDEX: 38.01 KG/M2 | SYSTOLIC BLOOD PRESSURE: 124 MMHG

## 2023-04-03 DIAGNOSIS — O26.899 RH NEGATIVE, ANTEPARTUM: ICD-10-CM

## 2023-04-03 DIAGNOSIS — Z3A.34 34 WEEKS GESTATION OF PREGNANCY: Primary | ICD-10-CM

## 2023-04-03 DIAGNOSIS — Z67.91 RH NEGATIVE, ANTEPARTUM: ICD-10-CM

## 2023-04-03 DIAGNOSIS — E66.09 CLASS 2 OBESITY DUE TO EXCESS CALORIES WITHOUT SERIOUS COMORBIDITY WITH BODY MASS INDEX (BMI) OF 37.0 TO 37.9 IN ADULT: ICD-10-CM

## 2023-04-03 LAB
GLUCOSE UR STRIP-MCNC: NEGATIVE MG/DL
PROT UR STRIP-MCNC: NEGATIVE MG/DL

## 2023-04-03 PROCEDURE — 0502F SUBSEQUENT PRENATAL CARE: CPT | Performed by: OBSTETRICS & GYNECOLOGY
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
## 2023-04-17 ENCOUNTER — ROUTINE PRENATAL (OUTPATIENT)
Dept: OBSTETRICS AND GYNECOLOGY | Age: 33
End: 2023-04-17
Payer: COMMERCIAL

## 2023-04-17 VITALS — DIASTOLIC BLOOD PRESSURE: 62 MMHG | SYSTOLIC BLOOD PRESSURE: 112 MMHG | WEIGHT: 250 LBS | BODY MASS INDEX: 38.01 KG/M2

## 2023-04-17 DIAGNOSIS — O99.019 MATERNAL ANEMIA IN PREGNANCY, ANTEPARTUM: ICD-10-CM

## 2023-04-17 DIAGNOSIS — Z67.91 RH NEGATIVE, ANTEPARTUM: ICD-10-CM

## 2023-04-17 DIAGNOSIS — O26.899 RH NEGATIVE, ANTEPARTUM: ICD-10-CM

## 2023-04-17 DIAGNOSIS — E66.09 CLASS 2 OBESITY DUE TO EXCESS CALORIES WITHOUT SERIOUS COMORBIDITY WITH BODY MASS INDEX (BMI) OF 37.0 TO 37.9 IN ADULT: ICD-10-CM

## 2023-04-17 DIAGNOSIS — O99.210 OBESITY AFFECTING PREGNANCY, ANTEPARTUM: ICD-10-CM

## 2023-04-17 DIAGNOSIS — Z13.89 SCREENING FOR HEMATURIA OR PROTEINURIA: Primary | ICD-10-CM

## 2023-04-17 DIAGNOSIS — Z36.85 ANTENATAL SCREENING FOR STREPTOCOCCUS B: ICD-10-CM

## 2023-04-17 DIAGNOSIS — Z34.90 PREGNANCY, UNSPECIFIED GESTATIONAL AGE: ICD-10-CM

## 2023-04-17 LAB
GLUCOSE UR STRIP-MCNC: NEGATIVE MG/DL
PROT UR STRIP-MCNC: NEGATIVE MG/DL

## 2023-04-17 PROCEDURE — 76819 FETAL BIOPHYS PROFIL W/O NST: CPT | Performed by: OBSTETRICS & GYNECOLOGY

## 2023-04-17 PROCEDURE — 76816 OB US FOLLOW-UP PER FETUS: CPT | Performed by: OBSTETRICS & GYNECOLOGY

## 2023-04-17 PROCEDURE — 81002 URINALYSIS NONAUTO W/O SCOPE: CPT | Performed by: OBSTETRICS & GYNECOLOGY

## 2023-04-17 PROCEDURE — 0502F SUBSEQUENT PRENATAL CARE: CPT | Performed by: OBSTETRICS & GYNECOLOGY

## 2023-04-17 NOTE — PROGRESS NOTES
Patient is feeling good fetal movements.  She does not want to do an induction of labor on the 6/10 it is her brother's birthday.    Ultrasound shows an estimated fetal weight of 6 pounds 10 ounces at the 56 percentile.  Abdominal circumference at the 67th percentile KATIE is normal at 10.  Baby is vertex  Blood pressure 112/62 with no protein  GBS culture is obtained  Cervix is closed thick and posterior    Assessment-36 weeks 4 days  Obesity-continue weekly BPP's.  Weight is reassuring.  Recommend kick counts.  Continue low-dose aspirin.  Patient is considering induction of labor on May 5 or May 9.  Rh--patient is received RhoGAM  Anemia-continue iron

## 2023-04-19 LAB — GP B STREP DNA SPEC QL NAA+PROBE: NEGATIVE

## 2023-04-24 ENCOUNTER — TELEPHONE (OUTPATIENT)
Dept: OBSTETRICS AND GYNECOLOGY | Age: 33
End: 2023-04-24

## 2023-04-24 NOTE — TELEPHONE ENCOUNTER
Pt called and has appt today @ 3:30 for OB CK & BPP.  She states she has a stomach bug and has been vomiting all night.  Is it okay for her to r/s her appt?  Please advise.  Thank you.

## 2023-04-25 ENCOUNTER — ROUTINE PRENATAL (OUTPATIENT)
Dept: OBSTETRICS AND GYNECOLOGY | Age: 33
End: 2023-04-25
Payer: COMMERCIAL

## 2023-04-25 VITALS — WEIGHT: 247.2 LBS | SYSTOLIC BLOOD PRESSURE: 134 MMHG | BODY MASS INDEX: 37.59 KG/M2 | DIASTOLIC BLOOD PRESSURE: 82 MMHG

## 2023-04-25 DIAGNOSIS — O99.019 MATERNAL ANEMIA IN PREGNANCY, ANTEPARTUM: ICD-10-CM

## 2023-04-25 DIAGNOSIS — Z3A.37 37 WEEKS GESTATION OF PREGNANCY: Primary | ICD-10-CM

## 2023-04-25 DIAGNOSIS — Z13.89 SCREENING FOR BLOOD OR PROTEIN IN URINE: ICD-10-CM

## 2023-04-25 DIAGNOSIS — E66.09 CLASS 2 OBESITY DUE TO EXCESS CALORIES WITHOUT SERIOUS COMORBIDITY WITH BODY MASS INDEX (BMI) OF 37.0 TO 37.9 IN ADULT: ICD-10-CM

## 2023-04-25 DIAGNOSIS — O26.899 RH NEGATIVE, ANTEPARTUM: ICD-10-CM

## 2023-04-25 DIAGNOSIS — Z67.91 RH NEGATIVE, ANTEPARTUM: ICD-10-CM

## 2023-04-25 LAB
CLARITY, POC: CLEAR
COLOR UR: NORMAL
GLUCOSE UR STRIP-MCNC: NEGATIVE MG/DL
PROT UR STRIP-MCNC: NEGATIVE MG/DL

## 2023-04-25 NOTE — PROGRESS NOTES
Patient has had a gastroenteritis but is now starting to feel better.  Her baby is moving well.  She would like to schedule an induction for May 9.  Last pregnancy the patient was induced 2 days past her due date and did have to do Cervidil cervical ripening.  Patient is not having any headaches.    Biophysical profile is 8 out of 8.  KATIE is normal at 11 and baby is vertex  Blood pressure 134/82 with no protein  Doppler heart tones are 134  GBS is negative  Cervix is closed and thick    Assessment-37 weeks 5 days  Obesity-continue weekly BPP's and kick counts.  Continue low-dose aspirin  We discussed induction of labor on May 9.  We will continue to assess the patient cervix to see if she will need Cervidil as she did with her last pregnancy.  Right negative-patient is received RhoGAM  Anemia-continue iron

## 2023-04-28 ENCOUNTER — HOSPITAL ENCOUNTER (INPATIENT)
Facility: HOSPITAL | Age: 33
LOS: 2 days | Discharge: HOME | End: 2023-04-30
Attending: OBSTETRICS & GYNECOLOGY | Admitting: OBSTETRICS & GYNECOLOGY
Payer: COMMERCIAL

## 2023-04-28 ENCOUNTER — ANESTHESIA EVENT (OUTPATIENT)
Dept: LABOR AND DELIVERY | Facility: HOSPITAL | Age: 33
End: 2023-04-28
Payer: COMMERCIAL

## 2023-04-28 ENCOUNTER — ANESTHESIA (OUTPATIENT)
Dept: LABOR AND DELIVERY | Facility: HOSPITAL | Age: 33
End: 2023-04-28
Payer: COMMERCIAL

## 2023-04-28 PROBLEM — Z34.90 PREGNANCY: Status: ACTIVE | Noted: 2023-04-28

## 2023-04-28 LAB
A1 MICROGLOB PLACENTAL VAG QL: POSITIVE
ABO GROUP BLD: NORMAL
ALBUMIN SERPL-MCNC: 3.3 G/DL (ref 3.5–5.2)
ALBUMIN/GLOB SERPL: 1.2 G/DL
ALP SERPL-CCNC: 148 U/L (ref 39–117)
ALT SERPL W P-5'-P-CCNC: 20 U/L (ref 1–33)
ANION GAP SERPL CALCULATED.3IONS-SCNC: 11.3 MMOL/L (ref 5–15)
AST SERPL-CCNC: 20 U/L (ref 1–32)
BASOPHILS # BLD AUTO: 0.03 10*3/MM3 (ref 0–0.2)
BASOPHILS NFR BLD AUTO: 0.3 % (ref 0–1.5)
BILIRUB SERPL-MCNC: 0.2 MG/DL (ref 0–1.2)
BILIRUB UR QL STRIP: NEGATIVE
BLD GP AB SCN SERPL QL: NEGATIVE
BUN SERPL-MCNC: 6 MG/DL (ref 6–20)
BUN/CREAT SERPL: 11.8 (ref 7–25)
CALCIUM SPEC-SCNC: 9.1 MG/DL (ref 8.6–10.5)
CHLORIDE SERPL-SCNC: 105 MMOL/L (ref 98–107)
CLARITY UR: CLEAR
CO2 SERPL-SCNC: 20.7 MMOL/L (ref 22–29)
COLOR UR: YELLOW
CREAT SERPL-MCNC: 0.51 MG/DL (ref 0.57–1)
CREAT UR-MCNC: 60.2 MG/DL
DEPRECATED RDW RBC AUTO: 42.4 FL (ref 37–54)
EGFRCR SERPLBLD CKD-EPI 2021: 126.6 ML/MIN/1.73
EOSINOPHIL # BLD AUTO: 0.09 10*3/MM3 (ref 0–0.4)
EOSINOPHIL NFR BLD AUTO: 1 % (ref 0.3–6.2)
ERYTHROCYTE [DISTWIDTH] IN BLOOD BY AUTOMATED COUNT: 12.9 % (ref 12.3–15.4)
GLOBULIN UR ELPH-MCNC: 2.8 GM/DL
GLUCOSE SERPL-MCNC: 68 MG/DL (ref 65–99)
GLUCOSE UR STRIP-MCNC: NEGATIVE MG/DL
HCT VFR BLD AUTO: 33.2 % (ref 34–46.6)
HGB BLD-MCNC: 11.3 G/DL (ref 12–15.9)
HGB UR QL STRIP.AUTO: NEGATIVE
IMM GRANULOCYTES # BLD AUTO: 0.08 10*3/MM3 (ref 0–0.05)
IMM GRANULOCYTES NFR BLD AUTO: 0.9 % (ref 0–0.5)
KETONES UR QL STRIP: NEGATIVE
LEUKOCYTE ESTERASE UR QL STRIP.AUTO: NEGATIVE
LYMPHOCYTES # BLD AUTO: 1.85 10*3/MM3 (ref 0.7–3.1)
LYMPHOCYTES NFR BLD AUTO: 20.3 % (ref 19.6–45.3)
MCH RBC QN AUTO: 30.9 PG (ref 26.6–33)
MCHC RBC AUTO-ENTMCNC: 34 G/DL (ref 31.5–35.7)
MCV RBC AUTO: 90.7 FL (ref 79–97)
MONOCYTES # BLD AUTO: 0.7 10*3/MM3 (ref 0.1–0.9)
MONOCYTES NFR BLD AUTO: 7.7 % (ref 5–12)
NEUTROPHILS NFR BLD AUTO: 6.36 10*3/MM3 (ref 1.7–7)
NEUTROPHILS NFR BLD AUTO: 69.8 % (ref 42.7–76)
NITRITE UR QL STRIP: NEGATIVE
NRBC BLD AUTO-RTO: 0 /100 WBC (ref 0–0.2)
PH UR STRIP.AUTO: 6 [PH] (ref 5–8)
PLATELET # BLD AUTO: 202 10*3/MM3 (ref 140–450)
PMV BLD AUTO: 9.6 FL (ref 6–12)
POTASSIUM SERPL-SCNC: 3.6 MMOL/L (ref 3.5–5.2)
PROT ?TM UR-MCNC: 8.2 MG/DL
PROT SERPL-MCNC: 6.1 G/DL (ref 6–8.5)
PROT UR QL STRIP: NEGATIVE
PROT/CREAT UR: 136.2 MG/G CREA (ref 0–200)
RBC # BLD AUTO: 3.66 10*6/MM3 (ref 3.77–5.28)
RH BLD: NEGATIVE
SODIUM SERPL-SCNC: 137 MMOL/L (ref 136–145)
SP GR UR STRIP: 1.01 (ref 1–1.03)
T&S EXPIRATION DATE: NORMAL
UROBILINOGEN UR QL STRIP: NORMAL
WBC NRBC COR # BLD: 9.11 10*3/MM3 (ref 3.4–10.8)

## 2023-04-28 PROCEDURE — 86901 BLOOD TYPING SEROLOGIC RH(D): CPT | Performed by: OBSTETRICS & GYNECOLOGY

## 2023-04-28 PROCEDURE — 84112 EVAL AMNIOTIC FLUID PROTEIN: CPT | Performed by: OBSTETRICS & GYNECOLOGY

## 2023-04-28 PROCEDURE — 80053 COMPREHEN METABOLIC PANEL: CPT | Performed by: OBSTETRICS & GYNECOLOGY

## 2023-04-28 PROCEDURE — 3E033VJ INTRODUCTION OF OTHER HORMONE INTO PERIPHERAL VEIN, PERCUTANEOUS APPROACH: ICD-10-PCS | Performed by: STUDENT IN AN ORGANIZED HEALTH CARE EDUCATION/TRAINING PROGRAM

## 2023-04-28 PROCEDURE — 85025 COMPLETE CBC W/AUTO DIFF WBC: CPT | Performed by: OBSTETRICS & GYNECOLOGY

## 2023-04-28 PROCEDURE — 99202 OFFICE O/P NEW SF 15 MIN: CPT | Performed by: OBSTETRICS & GYNECOLOGY

## 2023-04-28 PROCEDURE — 86900 BLOOD TYPING SEROLOGIC ABO: CPT | Performed by: OBSTETRICS & GYNECOLOGY

## 2023-04-28 PROCEDURE — 84156 ASSAY OF PROTEIN URINE: CPT | Performed by: OBSTETRICS & GYNECOLOGY

## 2023-04-28 PROCEDURE — C1755 CATHETER, INTRASPINAL: HCPCS | Performed by: ANESTHESIOLOGY

## 2023-04-28 PROCEDURE — 85461 HEMOGLOBIN FETAL: CPT | Performed by: OBSTETRICS & GYNECOLOGY

## 2023-04-28 PROCEDURE — 81003 URINALYSIS AUTO W/O SCOPE: CPT | Performed by: OBSTETRICS & GYNECOLOGY

## 2023-04-28 PROCEDURE — 82570 ASSAY OF URINE CREATININE: CPT | Performed by: OBSTETRICS & GYNECOLOGY

## 2023-04-28 PROCEDURE — 59400 OBSTETRICAL CARE: CPT | Performed by: STUDENT IN AN ORGANIZED HEALTH CARE EDUCATION/TRAINING PROGRAM

## 2023-04-28 PROCEDURE — 87086 URINE CULTURE/COLONY COUNT: CPT | Performed by: OBSTETRICS & GYNECOLOGY

## 2023-04-28 PROCEDURE — 0HQ9XZZ REPAIR PERINEUM SKIN, EXTERNAL APPROACH: ICD-10-PCS | Performed by: STUDENT IN AN ORGANIZED HEALTH CARE EDUCATION/TRAINING PROGRAM

## 2023-04-28 PROCEDURE — 86850 RBC ANTIBODY SCREEN: CPT | Performed by: OBSTETRICS & GYNECOLOGY

## 2023-04-28 RX ORDER — SODIUM CHLORIDE 0.9 % (FLUSH) 0.9 %
10 SYRINGE (ML) INJECTION AS NEEDED
Status: DISCONTINUED | OUTPATIENT
Start: 2023-04-28 | End: 2023-04-29 | Stop reason: HOSPADM

## 2023-04-28 RX ORDER — FAMOTIDINE 10 MG/ML
20 INJECTION, SOLUTION INTRAVENOUS 2 TIMES DAILY PRN
Status: DISCONTINUED | OUTPATIENT
Start: 2023-04-28 | End: 2023-04-29 | Stop reason: HOSPADM

## 2023-04-28 RX ORDER — LIDOCAINE HYDROCHLORIDE 10 MG/ML
5 INJECTION, SOLUTION EPIDURAL; INFILTRATION; INTRACAUDAL; PERINEURAL AS NEEDED
OUTPATIENT
Start: 2023-04-28

## 2023-04-28 RX ORDER — BUPIVACAINE HYDROCHLORIDE AND EPINEPHRINE 2.5; 5 MG/ML; UG/ML
INJECTION, SOLUTION EPIDURAL; INFILTRATION; INTRACAUDAL; PERINEURAL AS NEEDED
Status: DISCONTINUED | OUTPATIENT
Start: 2023-04-28 | End: 2023-04-28 | Stop reason: SURG

## 2023-04-28 RX ORDER — LIDOCAINE HYDROCHLORIDE AND EPINEPHRINE 15; 5 MG/ML; UG/ML
INJECTION, SOLUTION EPIDURAL AS NEEDED
Status: DISCONTINUED | OUTPATIENT
Start: 2023-04-28 | End: 2023-04-28 | Stop reason: SURG

## 2023-04-28 RX ORDER — EPHEDRINE SULFATE 50 MG/ML
5 INJECTION, SOLUTION INTRAVENOUS
Status: DISCONTINUED | OUTPATIENT
Start: 2023-04-28 | End: 2023-04-29 | Stop reason: HOSPADM

## 2023-04-28 RX ORDER — IBUPROFEN 600 MG/1
600 TABLET ORAL EVERY 6 HOURS PRN
Status: DISCONTINUED | OUTPATIENT
Start: 2023-04-28 | End: 2023-04-30 | Stop reason: HOSPADM

## 2023-04-28 RX ORDER — HYDROCODONE BITARTRATE AND ACETAMINOPHEN 10; 325 MG/1; MG/1
1 TABLET ORAL EVERY 4 HOURS PRN
Status: DISCONTINUED | OUTPATIENT
Start: 2023-04-28 | End: 2023-04-30 | Stop reason: HOSPADM

## 2023-04-28 RX ORDER — FAMOTIDINE 20 MG/1
20 TABLET, FILM COATED ORAL 2 TIMES DAILY PRN
Status: DISCONTINUED | OUTPATIENT
Start: 2023-04-28 | End: 2023-04-29 | Stop reason: HOSPADM

## 2023-04-28 RX ORDER — OXYTOCIN/0.9 % SODIUM CHLORIDE 30/500 ML
125 PLASTIC BAG, INJECTION (ML) INTRAVENOUS CONTINUOUS PRN
Status: COMPLETED | OUTPATIENT
Start: 2023-04-28 | End: 2023-04-28

## 2023-04-28 RX ORDER — ONDANSETRON 2 MG/ML
4 INJECTION INTRAMUSCULAR; INTRAVENOUS ONCE AS NEEDED
Status: DISCONTINUED | OUTPATIENT
Start: 2023-04-28 | End: 2023-04-29 | Stop reason: HOSPADM

## 2023-04-28 RX ORDER — TERBUTALINE SULFATE 1 MG/ML
0.25 INJECTION, SOLUTION SUBCUTANEOUS AS NEEDED
Status: DISCONTINUED | OUTPATIENT
Start: 2023-04-28 | End: 2023-04-29 | Stop reason: HOSPADM

## 2023-04-28 RX ORDER — BUPIVACAINE HYDROCHLORIDE AND EPINEPHRINE 2.5; 5 MG/ML; UG/ML
INJECTION, SOLUTION EPIDURAL; INFILTRATION; INTRACAUDAL; PERINEURAL
Status: COMPLETED
Start: 2023-04-28 | End: 2023-04-28

## 2023-04-28 RX ORDER — METHYLERGONOVINE MALEATE 0.2 MG/ML
200 INJECTION INTRAVENOUS ONCE AS NEEDED
Status: DISCONTINUED | OUTPATIENT
Start: 2023-04-28 | End: 2023-04-29 | Stop reason: HOSPADM

## 2023-04-28 RX ORDER — FENTANYL CIT 0.2 MG/100ML-ROPIV 0.2%-NACL 0.9% EPIDURAL INJ 2/0.2 MCG/ML-%
10 SOLUTION INJECTION CONTINUOUS
Status: DISCONTINUED | OUTPATIENT
Start: 2023-04-28 | End: 2023-04-29

## 2023-04-28 RX ORDER — FAMOTIDINE 10 MG/ML
20 INJECTION, SOLUTION INTRAVENOUS ONCE AS NEEDED
Status: DISCONTINUED | OUTPATIENT
Start: 2023-04-28 | End: 2023-04-29 | Stop reason: HOSPADM

## 2023-04-28 RX ORDER — LIDOCAINE HYDROCHLORIDE 15 MG/ML
INJECTION, SOLUTION EPIDURAL; INFILTRATION; INTRACAUDAL; PERINEURAL AS NEEDED
Status: DISCONTINUED | OUTPATIENT
Start: 2023-04-28 | End: 2023-04-28 | Stop reason: SURG

## 2023-04-28 RX ORDER — ERYTHROMYCIN 5 MG/G
OINTMENT OPHTHALMIC
Status: ACTIVE
Start: 2023-04-28 | End: 2023-04-29

## 2023-04-28 RX ORDER — CARBOPROST TROMETHAMINE 250 UG/ML
250 INJECTION, SOLUTION INTRAMUSCULAR
Status: DISCONTINUED | OUTPATIENT
Start: 2023-04-28 | End: 2023-04-29 | Stop reason: HOSPADM

## 2023-04-28 RX ORDER — MISOPROSTOL 200 UG/1
800 TABLET ORAL ONCE AS NEEDED
Status: COMPLETED | OUTPATIENT
Start: 2023-04-28 | End: 2023-04-28

## 2023-04-28 RX ORDER — MAGNESIUM CARB/ALUMINUM HYDROX 105-160MG
30 TABLET,CHEWABLE ORAL ONCE AS NEEDED
Status: DISCONTINUED | OUTPATIENT
Start: 2023-04-28 | End: 2023-04-29 | Stop reason: HOSPADM

## 2023-04-28 RX ORDER — OXYTOCIN/0.9 % SODIUM CHLORIDE 30/500 ML
2-30 PLASTIC BAG, INJECTION (ML) INTRAVENOUS
Status: DISCONTINUED | OUTPATIENT
Start: 2023-04-28 | End: 2023-04-29 | Stop reason: HOSPADM

## 2023-04-28 RX ORDER — OXYTOCIN/0.9 % SODIUM CHLORIDE 30/500 ML
250 PLASTIC BAG, INJECTION (ML) INTRAVENOUS CONTINUOUS
Status: DISPENSED | OUTPATIENT
Start: 2023-04-28 | End: 2023-04-28

## 2023-04-28 RX ORDER — HYDROCODONE BITARTRATE AND ACETAMINOPHEN 5; 325 MG/1; MG/1
1 TABLET ORAL EVERY 4 HOURS PRN
Status: DISCONTINUED | OUTPATIENT
Start: 2023-04-28 | End: 2023-04-30 | Stop reason: HOSPADM

## 2023-04-28 RX ORDER — DIPHENHYDRAMINE HYDROCHLORIDE 50 MG/ML
12.5 INJECTION INTRAMUSCULAR; INTRAVENOUS EVERY 8 HOURS PRN
Status: DISCONTINUED | OUTPATIENT
Start: 2023-04-28 | End: 2023-04-29 | Stop reason: HOSPADM

## 2023-04-28 RX ORDER — ONDANSETRON 2 MG/ML
4 INJECTION INTRAMUSCULAR; INTRAVENOUS EVERY 6 HOURS PRN
Status: DISCONTINUED | OUTPATIENT
Start: 2023-04-28 | End: 2023-04-29 | Stop reason: HOSPADM

## 2023-04-28 RX ORDER — SODIUM CHLORIDE 0.9 % (FLUSH) 0.9 %
10 SYRINGE (ML) INJECTION EVERY 12 HOURS SCHEDULED
Status: DISCONTINUED | OUTPATIENT
Start: 2023-04-28 | End: 2023-04-29 | Stop reason: HOSPADM

## 2023-04-28 RX ORDER — OXYTOCIN/0.9 % SODIUM CHLORIDE 30/500 ML
999 PLASTIC BAG, INJECTION (ML) INTRAVENOUS ONCE
Status: COMPLETED | OUTPATIENT
Start: 2023-04-28 | End: 2023-04-28

## 2023-04-28 RX ORDER — ACETAMINOPHEN 325 MG/1
650 TABLET ORAL EVERY 4 HOURS PRN
Status: DISCONTINUED | OUTPATIENT
Start: 2023-04-28 | End: 2023-04-29 | Stop reason: HOSPADM

## 2023-04-28 RX ORDER — PHYTONADIONE 1 MG/.5ML
INJECTION, EMULSION INTRAMUSCULAR; INTRAVENOUS; SUBCUTANEOUS
Status: ACTIVE
Start: 2023-04-28 | End: 2023-04-29

## 2023-04-28 RX ORDER — SODIUM CHLORIDE, SODIUM LACTATE, POTASSIUM CHLORIDE, CALCIUM CHLORIDE 600; 310; 30; 20 MG/100ML; MG/100ML; MG/100ML; MG/100ML
125 INJECTION, SOLUTION INTRAVENOUS CONTINUOUS
Status: DISCONTINUED | OUTPATIENT
Start: 2023-04-28 | End: 2023-04-29

## 2023-04-28 RX ORDER — ONDANSETRON 4 MG/1
4 TABLET, FILM COATED ORAL EVERY 6 HOURS PRN
Status: DISCONTINUED | OUTPATIENT
Start: 2023-04-28 | End: 2023-04-29 | Stop reason: HOSPADM

## 2023-04-28 RX ADMIN — BUPIVACAINE HYDROCHLORIDE AND EPINEPHRINE BITARTRATE 5 ML: 2.5; .0091 INJECTION, SOLUTION EPIDURAL; INFILTRATION; INTRACAUDAL; PERINEURAL at 20:38

## 2023-04-28 RX ADMIN — Medication 125 ML/HR: at 22:48

## 2023-04-28 RX ADMIN — SODIUM CHLORIDE, POTASSIUM CHLORIDE, SODIUM LACTATE AND CALCIUM CHLORIDE 125 ML/HR: 600; 310; 30; 20 INJECTION, SOLUTION INTRAVENOUS at 18:50

## 2023-04-28 RX ADMIN — LIDOCAINE HYDROCHLORIDE 3 ML: 15 INJECTION, SOLUTION EPIDURAL; INFILTRATION; INTRACAUDAL; PERINEURAL at 16:12

## 2023-04-28 RX ADMIN — SODIUM CHLORIDE, POTASSIUM CHLORIDE, SODIUM LACTATE AND CALCIUM CHLORIDE 999 ML/HR: 600; 310; 30; 20 INJECTION, SOLUTION INTRAVENOUS at 15:42

## 2023-04-28 RX ADMIN — SODIUM CHLORIDE, POTASSIUM CHLORIDE, SODIUM LACTATE AND CALCIUM CHLORIDE 125 ML/HR: 600; 310; 30; 20 INJECTION, SOLUTION INTRAVENOUS at 08:55

## 2023-04-28 RX ADMIN — LIDOCAINE HYDROCHLORIDE 3 ML: 15 INJECTION, SOLUTION EPIDURAL; INFILTRATION; INTRACAUDAL; PERINEURAL at 16:21

## 2023-04-28 RX ADMIN — MISOPROSTOL 800 MCG: 200 TABLET ORAL at 22:03

## 2023-04-28 RX ADMIN — Medication 2 MILLI-UNITS/MIN: at 10:27

## 2023-04-28 RX ADMIN — LIDOCAINE HYDROCHLORIDE 3 ML: 15 INJECTION, SOLUTION EPIDURAL; INFILTRATION; INTRACAUDAL; PERINEURAL at 16:16

## 2023-04-28 RX ADMIN — BUPIVACAINE HYDROCHLORIDE AND EPINEPHRINE BITARTRATE 5 ML: 2.5; .0091 INJECTION, SOLUTION EPIDURAL; INFILTRATION; INTRACAUDAL; PERINEURAL at 20:35

## 2023-04-28 RX ADMIN — Medication 10 ML/HR: at 16:27

## 2023-04-28 RX ADMIN — Medication 999 ML/HR: at 21:58

## 2023-04-28 RX ADMIN — LIDOCAINE HYDROCHLORIDE AND EPINEPHRINE 3 ML: 15; 5 INJECTION, SOLUTION EPIDURAL at 16:08

## 2023-04-28 NOTE — PLAN OF CARE
Problem: Adult Inpatient Plan of Care  Goal: Plan of Care Review  Outcome: Ongoing, Progressing  Flowsheets (Taken 4/28/2023 1858)  Progress: improving  Plan of Care Reviewed With:   patient   spouse   family  Outcome Evaluation: Pt here for SROM, augmented with pitocin. Pt on 20 mu/min of pitocin, comfortable with epidural.  Goal: Patient-Specific Goal (Individualized)  Outcome: Ongoing, Progressing  Flowsheets (Taken 4/28/2023 1858)  Patient-Specific Goals (Include Timeframe): Pain control with epidural, FORREST, breastfeeding.  Individualized Care Needs: Pt has 3 year old son at home, Lit  Anxieties, Fears or Concerns: None  Goal: Absence of Hospital-Acquired Illness or Injury  Outcome: Ongoing, Progressing  Intervention: Identify and Manage Fall Risk  Description: Perform standard risk assessment on admission using a validated tool or comprehensive approach appropriate to the patient; reassess fall risk frequently, with change in status or transfer to another level of care.  Communicate fall injury risk to interprofessional healthcare team.  Determine need for increased observation, equipment and environmental modification, such as low bed, signage and supportive, nonskid footwear.  Adjust safety measures to individual developmental age, stage and identified risk factors.  Reinforce the importance of safety and physical activity with patient and family.  Perform regular intentional rounding to assess need for position change, pain assessment and personal needs, including assistance with toileting.  Recent Flowsheet Documentation  Taken 4/28/2023 1700 by Melita Celis, RN  Safety Promotion/Fall Prevention: safety round/check completed  Goal: Optimal Comfort and Wellbeing  Outcome: Ongoing, Progressing  Intervention: Monitor Pain and Promote Comfort  Description: Assess pain level, treatment efficacy and patient response at regular intervals using a consistent pain scale.  Consider the presence and impact of  preexisting chronic pain.  Encourage patient and caregiver involvement in pain assessment, interventions and safety measures.  Recent Flowsheet Documentation  Taken 4/28/2023 1350 by Melita Celis RN  Pain Management Interventions: (Considering epidural) --  Goal: Readiness for Transition of Care  Outcome: Ongoing, Progressing   Goal Outcome Evaluation:  Plan of Care Reviewed With: patient, spouse, family        Progress: improving  Outcome Evaluation: Pt here for SROM, augmented with pitocin. Pt on 20 mu/min of pitocin, comfortable with epidural.

## 2023-04-28 NOTE — ANESTHESIA PROCEDURE NOTES
Labor Epidural    Pre-sedation assessment completed: 4/28/2023 4:00 PM    Patient reassessed immediately prior to procedure    Patient location during procedure: OB  Start Time: 4/28/2023 10:00 AM  Stop Time: 4/28/2023 4:28 PM  Indication:at surgeon's request  Performed By  Anesthesiologist: Jorge Reyes MD  Preanesthetic Checklist  Completed: patient identified, IV checked, site marked, risks and benefits discussed, surgical consent, monitors and equipment checked, pre-op evaluation and timeout performed  Prep:  Pt Position:sitting  Sterile Tech:cap, gloves, gown, mask and sterile barrier  Prep:chlorhexidine gluconate and isopropyl alcohol  Monitoring:blood pressure monitoring and EKG  Epidural Block Procedure:  Approach:midline  Guidance:landmark technique and palpation technique  Location:L4-L5  Needle Type:Tuohy  Needle Gauge:17  Loss of Resistance Medium: saline  Paresthesia: none  Aspiration:negative  Test Dose:negative  Number of Attempts: 1  Post Assessment:  Dressing:occlusive dressing applied and secured with tape  Pt Tolerance:patient tolerated the procedure well with no apparent complications  Complications:no

## 2023-04-28 NOTE — H&P
[facility]   Obstetrics and Gynecology   History & Physical    2023    Patient: Marisa Case          MR#:[id number]    Chief complaint:  SROM    Subjective     32 y.o. female  at 38w1d presents with spontaneous rupture of membranes at home around 5 AM with clear fluid noted.  She reports rare contractions.  Denies vaginal bleeding.  Endorses regular fetal movements.    This pregnancy has been largely uncomplicated.  She is Rh- and received RhoGAM this pregnancy.      Patient Active Problem List   Diagnosis   • Class 2 obesity due to excess calories without serious comorbidity with body mass index (BMI) of 37.0 to 37.9 in adult   • Recurrent pregnancy loss   • Rh negative status during pregnancy   • COVID   • Pregnancy, unspecified gestational age   • Maternal anemia in pregnancy, antepartum   • Pregnancy       Past Medical History:   Diagnosis Date   • Allergic 10/3/89    Penicillin, General Seasonal Allergies   • Chickenpox     as a child   • COVID-19 virus infection 01/10/2022    [facility]   • Dysmenorrhea    • History of medical problems 2018    History if Miscarriage and Partial Molar Pregnancy   • Incomplete  2022   • Miscarriage    • Molar pregnancy        Past Surgical History:   Procedure Laterality Date   • D & C WITH SUCTION N/A 2018    Procedure: DILATATION AND CURETTAGE WITH SUCTION;  Surgeon: Marcelo Valdez MD;  Location: [facility], [place], operated by [facility];  Service:    • D & C WITH SUCTION N/A 2022    Procedure: DILATATION AND CURETTAGE WITH SUCTION;  Surgeon: Marcelo Valdez MD;  Location: [facility];  Service: Obstetrics/Gynecology;  Laterality: N/A;   • WISDOM TOOTH EXTRACTION         Obstetric History:  OB History        5    Para   1    Term   1       0    AB   3    Living   1       SAB   2    IAB   0    Ectopic   0    Molar   1    Multiple   0    Live Births   1               Menstrual History:     Patient's last menstrual period was  08/04/2022.       # 1 - Date: 2018, Sex: None, Weight: None, GA: None, Delivery: None, Apgar1: None, Apgar5: None, Living: None, Birth Comments: None    # 2 - Date: 2018, Sex: None, Weight: None, GA: None, Delivery: None, Apgar1: None, Apgar5: None, Living: None, Birth Comments: None    # 3 - Date: 01/03/20, Sex: Male, Weight: 3465 g (7 lb 10.2 oz), GA: 40w2d, Delivery: Vaginal, Spontaneous, Apgar1: 8, Apgar5: 9, Living: Living, Birth Comments: scale 4    # 4 - Date: 01/2022, Sex: None, Weight: None, GA: None, Delivery: None, Apgar1: None, Apgar5: None, Living: None, Birth Comments: None    # 5 - Date: None, Sex: None, Weight: None, GA: None, Delivery: None, Apgar1: None, Apgar5: None, Living: None, Birth Comments: None      Prenatal Information:  Prenatal Results     POC Urine Glucose/Protein     Test Value Reference Range Date Time    Urine Glucose  Negative mg/dL Negative 04/25/23 1200    Urine Protein  Negative mg/dL Negative 04/25/23 1200          Initial Prenatal Labs     Test Value Reference Range Date Time    Hemoglobin  14.0 g/dL 11.1 - 15.9 10/18/22 1208    Hematocrit  41.8 % 34.0 - 46.6 10/18/22 1208    Platelets  273 x10E3/uL 150 - 450 10/18/22 1208    Rubella IgG  5.71 index Immune >0.99 10/18/22 1208    Hepatitis B SAg  Negative  Negative 10/18/22 1208    Hepatitis C Ab  <0.1 s/co ratio 0.0 - 0.9 10/18/22 1208    RPR  Non Reactive  Non Reactive 10/18/22 1208    T. Pallidum Ab         ABO  A   04/28/23 0853    Rh  Negative   04/28/23 0853    Antibody Screen  Negative  Negative 10/18/22 1208    HIV  Non Reactive  Non Reactive 10/18/22 1208    Urine Culture  50,000 CFU/mL Normal Urogenital Erica   03/30/23 0931       Final report   10/18/22 1130    Gonorrhea  Negative  Negative 06/06/19 1352    Chlamydia  Negative  Negative 06/06/19 1352    TSH  1.190 uIU/mL 0.450 - 4.500 01/27/22 0900    HgB A1c               2nd and 3rd Trimester     Test Value Reference Range Date Time    Hemoglobin (repeated)  11.3  g/dL 12.0 - 15.9 04/28/23 0853       11.6 g/dL 12.0 - 15.9 03/30/23 1015       10.7 g/dL 11.1 - 15.9 02/20/23 1143    Hematocrit (repeated)  33.2 % 34.0 - 46.6 04/28/23 0853       34.3 % 34.0 - 46.6 03/30/23 1015       31.7 % 34.0 - 46.6 02/20/23 1143    Platelets   202 10*3/mm3 140 - 450 04/28/23 0853       220 10*3/mm3 140 - 450 03/30/23 1015       271 x10E3/uL 150 - 450 02/20/23 1143       273 x10E3/uL 150 - 450 10/18/22 1208    GCT  100 mg/dL 70 - 139 02/20/23 1143    Antibody Screen (repeated)  Negative   04/28/23 0853       Negative  Negative 02/20/23 1143    GTT Fasting        GTT 1 Hr        GTT 2 Hr        GTT 3 Hr        Group B Strep  Negative  Negative 04/17/23 1634          Drug Screening     Test Value Reference Range Date Time    Amphetamine Screen        Barbiturate Screen        Benzodiazepine Screen        Methadone Screen        Phencyclidine Screen        Opiates Screen        THC Screen        Cocaine Screen        Propoxyphene Screen        Buprenorphine Screen        Methamphetamine Screen        Oxycodone Screen        Tricyclic Antidepressants Screen              Other (Risk screening)     Test Value Reference Range Date Time    Varicella IgG ^ Pos H/O   10/18/22     Parvovirus IgG        CMV IgG        Cystic Fibrosis        Hemoglobin electrophoresis        NIPT        MSAFP-4        AFP (for NTD only)  *Screen Negative*   12/12/22 1528          Legend    ^: Historical                      External Prenatal Results     Pregnancy Outside Results - Transcribed From Office Records - See Scanned Records For Details     Test Value Date Time    ABO  A  04/28/23 0853    Rh  Negative  04/28/23 0853    Antibody Screen  Negative  04/28/23 0853       Negative  02/20/23 1143       Negative  10/18/22 1208    Varicella IgG ^ Pos H/O  10/18/22     Rubella  5.71 index 10/18/22 1208    Hgb  11.3 g/dL 04/28/23 0853       11.6 g/dL 03/30/23 1015       10.7 g/dL 02/20/23 1143       14.0 g/dL 10/18/22 1208     Hct  33.2 % 04/28/23 0853       34.3 % 03/30/23 1015       31.7 % 02/20/23 1143       41.8 % 10/18/22 1208    Glucose Fasting GTT       Glucose Tolerance Test 1 hour       Glucose Tolerance Test 3 hour       Gonorrhea (discrete)  Negative  06/06/19 1352    Chlamydia (discrete)  Negative  06/06/19 1352    RPR  Non Reactive  10/18/22 1208    VDRL       Syphilis Antibody       HBsAg  Negative  10/18/22 1208    Herpes Simplex Virus PCR       Herpes Simplex VIrus Culture       HIV  Non Reactive  10/18/22 1208    Hep C RNA Quant PCR       Hep C Antibody  <0.1 s/co ratio 10/18/22 1208    AFP  33.0 ng/mL 12/12/22 1528    Group B Strep  Negative  04/17/23 1634    GBS Susceptibility to Clindamycin       GBS Susceptibility to Erythromycin       Fetal Fibronectin       Genetic Testing, Maternal Blood             Drug Screening     Test Value Date Time    Urine Drug Screen       Amphetamine Screen       Barbiturate Screen       Benzodiazepine Screen       Methadone Screen       Phencyclidine Screen       Opiates Screen       THC Screen       Cocaine Screen       Propoxyphene Screen       Buprenorphine Screen       Methamphetamine Screen       Oxycodone Screen       Tricyclic Antidepressants Screen             Legend    ^: Historical                          Family History   Problem Relation Age of Onset   • No Known Problems Mother    • No Known Problems Father    • Cancer Maternal Grandfather    • Malig Hyperthermia Neg Hx        Social History     Tobacco Use   • Smoking status: Never   • Smokeless tobacco: Never   Vaping Use   • Vaping Use: Never used   Substance Use Topics   • Alcohol use: No   • Drug use: No       Penicillins      Current Facility-Administered Medications:   •  acetaminophen (TYLENOL) tablet 650 mg, 650 mg, Oral, Q4H PRN, Shawna Myers MD  •  butorphanol (STADOL) injection 2 mg, 2 mg, Intravenous, Q3H PRN, Shawna Myers MD  •  diphenhydrAMINE (BENADRYL) injection 12.5 mg, 12.5 mg, Intravenous, Q8H  PRN, Jorge Reyes MD  •  ePHEDrine injection 5 mg, 5 mg, Intravenous, Q15 Min PRN, Jorge Reyes MD  •  famotidine (PEPCID) injection 20 mg, 20 mg, Intravenous, BID PRN **OR** famotidine (PEPCID) tablet 20 mg, 20 mg, Oral, BID PRN, Shawna Myers MD  •  famotidine (PEPCID) injection 20 mg, 20 mg, Intravenous, Once PRN, Jorge Reyes MD  •  fentaNYL 2mcg/mL and ropivacaine 0.2% in NS epidural 100mL, 10 mL/hr, Epidural, Continuous, Jorge Reyes MD, Last Rate: 10 mL/hr at 04/28/23 1627, 10 mL/hr at 04/28/23 1627  •  lactated ringers infusion, 125 mL/hr, Intravenous, Continuous, Shawna Myers MD, Last Rate: 125 mL/hr at 04/28/23 1623, 125 mL/hr at 04/28/23 1623  •  mineral oil liquid 30 mL, 30 mL, Topical, Once PRN, Shawna Myers MD  •  ondansetron (ZOFRAN) tablet 4 mg, 4 mg, Oral, Q6H PRN **OR** ondansetron (ZOFRAN) injection 4 mg, 4 mg, Intravenous, Q6H PRN, Shawna Myers MD  •  ondansetron (ZOFRAN) injection 4 mg, 4 mg, Intravenous, Once PRN, Jorge Reyes MD  •  oxytocin (PITOCIN) 30 units in 0.9% sodium chloride 500 mL (premix), 2-20 masood-units/min, Intravenous, Titrated, Shawna Myers MD, Last Rate: 16 mL/hr at 04/28/23 1617, 16 masood-units/min at 04/28/23 1617  •  sodium chloride 0.9 % flush 10 mL, 10 mL, Intravenous, Q12H, Shawna Myers MD  •  sodium chloride 0.9 % flush 10 mL, 10 mL, Intravenous, PRN, Shawna Myers MD  •  terbutaline (BRETHINE) injection 0.25 mg, 0.25 mg, Subcutaneous, PRN, Shawna Myers MD    Facility-Administered Medications Ordered in Other Encounters:   •  Lidocaine HCl (PF) (XYLOCAINE) 1.5 % injection, , Epidural, PRN, Jorge Reyes MD, 3 mL at 04/28/23 1621  •  lidocaine-EPINEPHrine (XYLOCAINE W/EPI) 1.5 %-1:[id number] injection, , Epidural, PRN, Jorge Reyes MD, 3 mL at 04/28/23 1608    Review of Systems  Review of Systems   All other systems reviewed and are negative.      Objective     Vital Signs  Temp:  [98.2 °F (36.8 °C)-99.2 °F  (37.3 °C)] 98.7 °F (37.1 °C)  Heart Rate:  [79-98] 94  Resp:  [16-17] 17  BP: (103-131)/(62-89) 122/79    Physical Exam:  Physical Exam  Vitals and nursing note reviewed.   Constitutional:       General: She is not in acute distress.     Appearance: Normal appearance.   HENT:      Head: Normocephalic and atraumatic.   Eyes:      Extraocular Movements: Extraocular movements intact.   Cardiovascular:      Rate and Rhythm: Normal rate.   Pulmonary:      Effort: Pulmonary effort is normal. No respiratory distress.   Abdominal:      General: There is no distension.      Palpations: Abdomen is soft. There is no mass.      Tenderness: There is no abdominal tenderness.   Genitourinary:     Comments: Cervix 0.5/50/-4 per RN  Musculoskeletal:         General: Normal range of motion.      Cervical back: Normal range of motion.   Skin:     General: Skin is warm and dry.   Neurological:      General: No focal deficit present.      Mental Status: She is alert and oriented to person, place, and time.   Psychiatric:         Mood and Affect: Mood normal.         Behavior: Behavior normal.           Hospital problem list:    Class 2 obesity due to excess calories without serious comorbidity with body mass index (BMI) of 37.0 to 37.9 in adult    Rh negative status during pregnancy    Maternal anemia in pregnancy, antepartum    Pregnancy      Assessment & Plan     1. Intrauterine pregnancy at 38w1d with SROM  -Admit for induction of labor with Pitocin  -GBS negative  -Reviewed procedure with patient.  I discussed the risks including but not limited to bleeding, infection and damage to internal organs.  Understanding of the procedure is voiced.     2. Rh negative - s/p rhogam  3. Anemia - Hb 11.3    Yuly Mixon MD  04/28/23  16:43 EDT      Patient Care Team:  Provider, No Known as PCP - Marcelo Arshad MD as PCP - Ob/Gyn (Obstetrics and Gynecology)  Sharon Erazo APRN as Nurse Practitioner (Obstetrics and  Gynecology)

## 2023-04-28 NOTE — ANESTHESIA PREPROCEDURE EVALUATION
Anesthesia Evaluation     Patient summary reviewed and Nursing notes reviewed                Airway   Mallampati: II  TM distance: >3 FB  Neck ROM: full  no difficulty expected  Dental - normal exam     Pulmonary - negative pulmonary ROS and normal exam   Cardiovascular - negative cardio ROS and normal exam        Neuro/Psych- negative ROS  GI/Hepatic/Renal/Endo - negative ROS     Musculoskeletal (-) negative ROS    Abdominal  - normal exam   Substance History - negative use     OB/GYN negative ob/gyn ROS         Other                        Anesthesia Plan    ASA 2     epidural     (38w1d    )    Anesthetic plan, risks, benefits, and alternatives have been provided, discussed and informed consent has been obtained with: patient.        CODE STATUS:    Level Of Support Discussed With: Patient  Code Status (Patient has no pulse and is not breathing): CPR (Attempt to Resuscitate)  Medical Interventions (Patient has pulse or is breathing): Full Support  Release to patient: Routine Release

## 2023-04-28 NOTE — OBED NOTES
TEO Note OB        Patient Name: Marias Case  YOB: 1990  MRN: [id number]  Admission Date: 2023  6:20 AM  Date of Service: 2023    Chief Complaint: Rupture of Membranes (Leaking since 5am. Cramping since then. Good FM, no VB.)        Subjective     Marisa Case is a 32 y.o. female  at 38w1d with Estimated Date of Delivery: 23 who presents with the chief complaint listed above.  She sees Marcelo Valdez MD for her prenatal care. Her pregnancy has been complicated by: Maternal anemia, COVID, Rh- status, class II obesity with BMI 37.59.  She presents secondary to possible ROM since 5 AM when she had a gush of fluid, continues to leak a small amount since then.  She is cramping since she noted fluid leak.  She describes fetal movement as normal.  She denies vaginal bleeding. .          Objective   Patient Active Problem List    Diagnosis    • Maternal anemia in pregnancy, antepartum [O99.019]    • Pregnancy, unspecified gestational age [Z34.90]    • COVID [U07.1]    • Rh negative status during pregnancy [O26.899, Z67.91]    • Recurrent pregnancy loss [N96]    • Class 2 obesity due to excess calories without serious comorbidity with body mass index (BMI) of 37.0 to 37.9 in adult [E66.09, Z68.37]         OB History    Para Term  AB Living   5 1 1 0 3 1   SAB IAB Ectopic Molar Multiple Live Births   2 0 0 1 0 1      # Outcome Date GA Lbr Nixon/2nd Weight Sex Delivery Anes PTL Lv   5 Current            4 SAB 2022           3 Term 20 40w2d 15:18 / 00:58 3465 g (7 lb 10.2 oz) M Vag-Spont EPI N DALIA      Birth Comments: scale 4      Name: DEVON CASE      Apgar1: 8  Apgar5: 9   2 2018           1 Molar 2018                Past Medical History:   Diagnosis Date   • Allergic 10/3/89    Penicillin, General Seasonal Allergies   • Chickenpox     as a child   • COVID-19 virus infection 01/10/2022    TESTED THROUGH [facility]   • Dysmenorrhea    • History  of medical problems 2018    History if Miscarriage and Partial Molar Pregnancy   • Incomplete  2022   • Miscarriage    • Molar pregnancy        Past Surgical History:   Procedure Laterality Date   • D & C WITH SUCTION N/A 2018    Procedure: DILATATION AND CURETTAGE WITH SUCTION;  Surgeon: Marcelo Valdez MD;  Location: [facility], [place], operated by [facility];  Service:    • D & C WITH SUCTION N/A 2022    Procedure: DILATATION AND CURETTAGE WITH SUCTION;  Surgeon: Marcelo Valdez MD;  Location: [facility] OR;  Service: Obstetrics/Gynecology;  Laterality: N/A;   • WISDOM TOOTH EXTRACTION         No current facility-administered medications on file prior to encounter.     Current Outpatient Medications on File Prior to Encounter   Medication Sig Dispense Refill   • aspirin 81 MG EC tablet Take 1 tablet by mouth Daily. 30 tablet 6   • cetirizine (zyrTEC) 10 MG tablet Take 1 tablet by mouth Daily.     • ferrous sulfate 325 (65 FE) MG tablet Take 1 tablet by mouth Daily. 30 tablet 3   • Prenatal Vit w/Bl-Paqmpwkcf-DW (PNV PO) Take  by mouth.         Allergies   Allergen Reactions   • Penicillins Hives       Family History   Problem Relation Age of Onset   • No Known Problems Mother    • No Known Problems Father    • Cancer Maternal Grandfather    • Malig Hyperthermia Neg Hx        Social History     Socioeconomic History   • Marital status:      Spouse name: Dakota   Tobacco Use   • Smoking status: Never   • Smokeless tobacco: Never   Vaping Use   • Vaping Use: Never used   Substance and Sexual Activity   • Alcohol use: No   • Drug use: No   • Sexual activity: Yes     Partners: Male     Birth control/protection: None     Comment: Currently trying to conceive           Review of Systems   Constitutional: Negative for chills, fatigue and fever.   HENT: Negative.    Eyes: Negative for photophobia and visual disturbance.   Respiratory: Negative for cough, chest tightness and shortness of breath.    Cardiovascular: Negative  for chest pain and leg swelling.   Gastrointestinal: Positive for abdominal pain (intermittent abdominal pain). Negative for diarrhea, nausea and vomiting.   Genitourinary: Positive for vaginal discharge ( leaking fluid since 5 AM). Negative for dysuria, flank pain, frequency, hematuria, pelvic pain, urgency and vaginal bleeding.   Musculoskeletal: Negative for back pain.   Neurological: Negative for dizziness, seizures, weakness and headaches.          PHYSICAL EXAM:      VITAL SIGNS:  Vitals:    04/28/23 0700   BP: 130/67   Pulse: 98   Resp: 16   Temp: 99.2 °F (37.3 °C)   TempSrc: Oral        FHT'S:                   Baseline:  150 BPM  Variability:  Moderate = 6 - 25 BPM  Accelerations:  15 x 15 accelerations present     Decelerations:  absent  Contractions:   Occasional      Interpretation:   Reactive NST, CAT 1 tracing        PHYSICAL EXAM:    General: well developed; well nourished  no acute distress   Heart: Not performed.   Lungs: breathing is unlabored     Abdomen: soft, non-tender; no masses  no umbilical or inguinal hernias are present       Cervix: was examined by nurse  Cervical Dilation (cm): 0-1  Cervical Effacement: 50%  Fetal Station: -4  Cervical Consistency: medium  Cervical Position: posterior   Contractions: occasional         Extremities: peripheral pulses normal, no pedal edema, no clubbing or cyanosis      LABS AND TESTING ORDERED:  1. Uterine and fetal monitoring  2. Urinalysis  3. ROM PLUS  4. Urine PCR    LAB RESULTS:    Recent Results (from the past 24 hour(s))   Rapid Assay For ROM - Amniotic Fluid, Amniotic Sac    Collection Time: 04/28/23  6:53 AM    Specimen: Amniotic Sac; Amniotic Fluid   Result Value Ref Range    Rupture of Membranes Positive (C) Negative       Lab Results   Component Value Date    ABO A 10/18/2022    RH Negative 10/18/2022       Lab Results   Component Value Date    STREPGPB Negative 04/17/2023                 Assessment & Plan      ASSESSMENT/PLAN:  Marisa L  Manpreet is a 32 y.o. female  at 38w1d who presented with possible rupture of membranes.   She was evaluated for ROM and was found to have a Pooling present and Positive Rom Plus (confirmed rupture of membranes) and her cervix was noted to be Cervical Dilation (cm): 0-1 cm/ Cervical Effacement: 50% % effaced/ vertex at Fetal Station: -4 station on last exam.  She was noted to have a  Reactive NST.  CAT 1 tracing.  In view of these findings she will be admitted for delivery by Dr Lucia BATISTA , who will assume care and place orders at this time.      Final Impression:  • Pregnancy at 38w1d  •   • Reactive NST.  CAT 1 tracing  • Confirmed ROM  with Pooling present and Positive Rom Plus  • Contractions: occasional   Lab Results   Component Value Date    ABO A 10/18/2022    RH Negative 10/18/2022   •   Lab Results   Component Value Date    STREPGPB Negative 2023   •   •       PLAN:  • Patient will be admitted to Dr. Lucia BATISTA due to confirmed rupture of membranes and Dr. Lucia BATISTA  will assume care of the patient at this time and provide further orders and management      I have spent 30 minutes including face to face time with the patient, greater than 50% in discussion of the diagnosis (counseling) and/or coordination of care.     Jonny Murphy MD  2023  07:25 EDT  OB Hospitalist  Phone:    [phone]

## 2023-04-29 LAB
ABO GROUP BLD: NORMAL
BACTERIA SPEC AEROBE CULT: NORMAL
BASOPHILS # BLD AUTO: 0.04 10*3/MM3 (ref 0–0.2)
BASOPHILS NFR BLD AUTO: 0.3 % (ref 0–1.5)
DEPRECATED RDW RBC AUTO: 42.3 FL (ref 37–54)
EOSINOPHIL # BLD AUTO: 0.06 10*3/MM3 (ref 0–0.4)
EOSINOPHIL NFR BLD AUTO: 0.5 % (ref 0.3–6.2)
ERYTHROCYTE [DISTWIDTH] IN BLOOD BY AUTOMATED COUNT: 12.8 % (ref 12.3–15.4)
FETAL BLEED: NEGATIVE
HCT VFR BLD AUTO: 32.4 % (ref 34–46.6)
HGB BLD-MCNC: 11.2 G/DL (ref 12–15.9)
IMM GRANULOCYTES # BLD AUTO: 0.07 10*3/MM3 (ref 0–0.05)
IMM GRANULOCYTES NFR BLD AUTO: 0.6 % (ref 0–0.5)
LYMPHOCYTES # BLD AUTO: 1.86 10*3/MM3 (ref 0.7–3.1)
LYMPHOCYTES NFR BLD AUTO: 15.3 % (ref 19.6–45.3)
MCH RBC QN AUTO: 31.3 PG (ref 26.6–33)
MCHC RBC AUTO-ENTMCNC: 34.6 G/DL (ref 31.5–35.7)
MCV RBC AUTO: 90.5 FL (ref 79–97)
MONOCYTES # BLD AUTO: 0.76 10*3/MM3 (ref 0.1–0.9)
MONOCYTES NFR BLD AUTO: 6.2 % (ref 5–12)
NEUTROPHILS NFR BLD AUTO: 77.1 % (ref 42.7–76)
NEUTROPHILS NFR BLD AUTO: 9.39 10*3/MM3 (ref 1.7–7)
NRBC BLD AUTO-RTO: 0 /100 WBC (ref 0–0.2)
NUMBER OF DOSES: NORMAL
PLATELET # BLD AUTO: 184 10*3/MM3 (ref 140–450)
PMV BLD AUTO: 9.7 FL (ref 6–12)
RBC # BLD AUTO: 3.58 10*6/MM3 (ref 3.77–5.28)
RH BLD: NEGATIVE
WBC NRBC COR # BLD: 12.18 10*3/MM3 (ref 3.4–10.8)

## 2023-04-29 PROCEDURE — 85025 COMPLETE CBC W/AUTO DIFF WBC: CPT | Performed by: STUDENT IN AN ORGANIZED HEALTH CARE EDUCATION/TRAINING PROGRAM

## 2023-04-29 PROCEDURE — 0503F POSTPARTUM CARE VISIT: CPT | Performed by: OBSTETRICS & GYNECOLOGY

## 2023-04-29 PROCEDURE — 25010000002 RHO D IMMUNE GLOBULIN 1500 UNIT/2ML SOLUTION PREFILLED SYRINGE: Performed by: STUDENT IN AN ORGANIZED HEALTH CARE EDUCATION/TRAINING PROGRAM

## 2023-04-29 RX ORDER — PROMETHAZINE HYDROCHLORIDE 12.5 MG/1
12.5 TABLET ORAL EVERY 4 HOURS PRN
Status: DISCONTINUED | OUTPATIENT
Start: 2023-04-29 | End: 2023-04-30 | Stop reason: HOSPADM

## 2023-04-29 RX ORDER — SODIUM CHLORIDE 0.9 % (FLUSH) 0.9 %
1-10 SYRINGE (ML) INJECTION AS NEEDED
Status: DISCONTINUED | OUTPATIENT
Start: 2023-04-29 | End: 2023-04-30 | Stop reason: HOSPADM

## 2023-04-29 RX ORDER — ONDANSETRON 2 MG/ML
4 INJECTION INTRAMUSCULAR; INTRAVENOUS EVERY 6 HOURS PRN
Status: DISCONTINUED | OUTPATIENT
Start: 2023-04-29 | End: 2023-04-30 | Stop reason: HOSPADM

## 2023-04-29 RX ORDER — DOCUSATE SODIUM 100 MG/1
100 CAPSULE, LIQUID FILLED ORAL 2 TIMES DAILY
Status: DISCONTINUED | OUTPATIENT
Start: 2023-04-29 | End: 2023-04-30 | Stop reason: HOSPADM

## 2023-04-29 RX ORDER — HYDROCORTISONE 25 MG/G
1 CREAM TOPICAL AS NEEDED
Status: DISCONTINUED | OUTPATIENT
Start: 2023-04-29 | End: 2023-04-30 | Stop reason: HOSPADM

## 2023-04-29 RX ORDER — PROMETHAZINE HYDROCHLORIDE 25 MG/1
25 TABLET ORAL EVERY 6 HOURS PRN
Status: DISCONTINUED | OUTPATIENT
Start: 2023-04-29 | End: 2023-04-30 | Stop reason: HOSPADM

## 2023-04-29 RX ORDER — CARBOPROST TROMETHAMINE 250 UG/ML
250 INJECTION, SOLUTION INTRAMUSCULAR ONCE AS NEEDED
Status: DISCONTINUED | OUTPATIENT
Start: 2023-04-29 | End: 2023-04-30 | Stop reason: HOSPADM

## 2023-04-29 RX ORDER — ACETAMINOPHEN 325 MG/1
650 TABLET ORAL EVERY 6 HOURS PRN
Status: DISCONTINUED | OUTPATIENT
Start: 2023-04-29 | End: 2023-04-30 | Stop reason: HOSPADM

## 2023-04-29 RX ORDER — DIPHENHYDRAMINE HCL 25 MG
50 CAPSULE ORAL NIGHTLY PRN
Status: DISCONTINUED | OUTPATIENT
Start: 2023-04-29 | End: 2023-04-30 | Stop reason: HOSPADM

## 2023-04-29 RX ORDER — PROMETHAZINE HYDROCHLORIDE 12.5 MG/1
12.5 SUPPOSITORY RECTAL EVERY 6 HOURS PRN
Status: DISCONTINUED | OUTPATIENT
Start: 2023-04-29 | End: 2023-04-30 | Stop reason: HOSPADM

## 2023-04-29 RX ORDER — ONDANSETRON 4 MG/1
4 TABLET, FILM COATED ORAL EVERY 8 HOURS PRN
Status: DISCONTINUED | OUTPATIENT
Start: 2023-04-29 | End: 2023-04-30 | Stop reason: HOSPADM

## 2023-04-29 RX ORDER — BISACODYL 10 MG
10 SUPPOSITORY, RECTAL RECTAL DAILY PRN
Status: DISCONTINUED | OUTPATIENT
Start: 2023-04-29 | End: 2023-04-30 | Stop reason: HOSPADM

## 2023-04-29 RX ORDER — TRANEXAMIC ACID 10 MG/ML
1000 INJECTION, SOLUTION INTRAVENOUS ONCE AS NEEDED
Status: DISCONTINUED | OUTPATIENT
Start: 2023-04-29 | End: 2023-04-30 | Stop reason: HOSPADM

## 2023-04-29 RX ORDER — MISOPROSTOL 200 UG/1
600 TABLET ORAL ONCE AS NEEDED
Status: DISCONTINUED | OUTPATIENT
Start: 2023-04-29 | End: 2023-04-30 | Stop reason: HOSPADM

## 2023-04-29 RX ADMIN — IBUPROFEN 600 MG: 600 TABLET, FILM COATED ORAL at 15:11

## 2023-04-29 RX ADMIN — IBUPROFEN 600 MG: 600 TABLET, FILM COATED ORAL at 09:00

## 2023-04-29 RX ADMIN — IBUPROFEN 600 MG: 600 TABLET, FILM COATED ORAL at 01:53

## 2023-04-29 RX ADMIN — DOCUSATE SODIUM 100 MG: 100 CAPSULE, LIQUID FILLED ORAL at 20:09

## 2023-04-29 RX ADMIN — HUMAN RHO(D) IMMUNE GLOBULIN 1500 UNITS: 1500 SOLUTION INTRAMUSCULAR; INTRAVENOUS at 09:00

## 2023-04-29 RX ADMIN — IBUPROFEN 600 MG: 600 TABLET, FILM COATED ORAL at 22:15

## 2023-04-29 RX ADMIN — DOCUSATE SODIUM 100 MG: 100 CAPSULE, LIQUID FILLED ORAL at 15:11

## 2023-04-29 NOTE — PLAN OF CARE
Problem: Adult Inpatient Plan of Care  Goal: Plan of Care Review  Outcome: Ongoing, Progressing  Flowsheets  Taken 2023 225 by Praveen Blanco RN  Progress: improving  Outcome Evaluation:  at 2152. pt had 1st degree tear that was repaired and received cytotec during delivery. pt has had scant to light bleeding in recovery. no reports of pain  Taken 2023 1858 by Melita Celis RN  Plan of Care Reviewed With:   patient   spouse   family  Goal: Patient-Specific Goal (Individualized)  Outcome: Ongoing, Progressing  Flowsheets (Taken 2023 225)  Patient-Specific Goals (Include Timeframe): healthy mom and baby for discharge  Individualized Care Needs: FORREST, breastfeeding, involve in POC  Anxieties, Fears or Concerns: none noted at this time  Goal: Absence of Hospital-Acquired Illness or Injury  Outcome: Ongoing, Progressing  Intervention: Identify and Manage Fall Risk  Recent Flowsheet Documentation  Taken 2023 2232 by Praveen Blanco RN  Safety Promotion/Fall Prevention: safety round/check completed  Taken 2023 2115 by Praveen Blanco RN  Safety Promotion/Fall Prevention: safety round/check completed  Taken 2023 1915 by Praveen Blanco RN  Safety Promotion/Fall Prevention: safety round/check completed  Intervention: Prevent and Manage VTE (Venous Thromboembolism) Risk  Recent Flowsheet Documentation  Taken 2023 2218 by Praveen Blanco RN  Activity Management: bedrest  Goal: Optimal Comfort and Wellbeing  Outcome: Ongoing, Progressing  Intervention: Monitor Pain and Promote Comfort  Recent Flowsheet Documentation  Taken 2023 by Praveen Blanco RN  Pain Management Interventions: (redose requested, position changed, button pressed) other (see comments)  Intervention: Provide Person-Centered Care  Recent Flowsheet Documentation  Taken 2023 2246 by Praveen Blanco RN  Trust Relationship/Rapport:   care explained   choices provided   emotional support provided    questions answered   questions encouraged  Taken 2023 by Praveen Blanco RN  Trust Relationship/Rapport:   care explained   choices provided   emotional support provided   questions answered   questions encouraged  Taken 2023 by Praveen Blanco RN  Trust Relationship/Rapport:   care explained   choices provided   emotional support provided   questions answered   questions encouraged  Taken 2023 by Praveen Blanco RN  Trust Relationship/Rapport:   care explained   choices provided   emotional support provided   questions answered   questions encouraged  Goal: Readiness for Transition of Care  Outcome: Ongoing, Progressing     Problem: Bleeding (Labor)  Goal: Hemostasis  Outcome: Ongoing, Progressing     Problem: Change in Fetal Wellbeing (Labor)  Goal: Stable Fetal Wellbeing  Outcome: Ongoing, Progressing     Problem: Infection (Labor)  Goal: Absence of Infection Signs and Symptoms  Outcome: Ongoing, Progressing     Problem: Labor Pain (Labor)  Goal: Acceptable Pain Control  Outcome: Ongoing, Progressing     Problem: Uterine Tachysystole (Labor)  Goal: Normal Uterine Contraction Pattern  Outcome: Ongoing, Progressing     Problem:  Fall Injury Risk  Goal: Absence of Fall, Infant Drop and Related Injury  Outcome: Ongoing, Progressing  Intervention: Promote Injury-Free Environment  Recent Flowsheet Documentation  Taken 2023 by Praveen Blanco RN  Safety Promotion/Fall Prevention: safety round/check completed  Taken 2023 by Praveen Blanco RN  Safety Promotion/Fall Prevention: safety round/check completed  Taken 2023 by Praveen Blanco, RN  Safety Promotion/Fall Prevention: safety round/check completed     Problem: Skin Injury Risk Increased  Goal: Skin Health and Integrity  Outcome: Ongoing, Progressing     Problem: Device-Related Complication Risk (Anesthesia/Analgesia, Neuraxial)  Goal: Safe Infusion Delivery Completion  Outcome: Ongoing,  Progressing     Problem: Infection (Anesthesia/Analgesia, Neuraxial)  Goal: Absence of Infection Signs and Symptoms  Outcome: Ongoing, Progressing     Problem: Nausea and Vomiting (Anesthesia/Analgesia, Neuraxial)  Goal: Nausea and Vomiting Relief  Outcome: Ongoing, Progressing     Problem: Pain (Anesthesia/Analgesia, Neuraxial)  Goal: Effective Pain Control  Outcome: Ongoing, Progressing  Intervention: Prevent or Manage Pain  Recent Flowsheet Documentation  Taken 2023 by Praveen Blanco RN  Pain Management Interventions: (redose requested, position changed, button pressed) other (see comments)     Problem: Respiratory Compromise (Anesthesia/Analgesia, Neuraxial)  Goal: Effective Oxygenation and Ventilation  Outcome: Ongoing, Progressing     Problem: Sensorimotor Impairment (Anesthesia/Analgesia, Neuraxial)  Goal: Baseline Motor Function  Outcome: Ongoing, Progressing  Intervention: Optimize Sensorimotor Function  Recent Flowsheet Documentation  Taken 20232 by Praveen Blanco RN  Safety Promotion/Fall Prevention: safety round/check completed  Taken 20235 by Praveen Blanco RN  Safety Promotion/Fall Prevention: safety round/check completed  Taken 2023 1915 by Praveen Blanco RN  Safety Promotion/Fall Prevention: safety round/check completed     Problem: Urinary Retention (Anesthesia/Analgesia, Neuraxial)  Goal: Effective Urinary Elimination  Outcome: Ongoing, Progressing   Goal Outcome Evaluation:           Progress: improving  Outcome Evaluation:  at 2152. pt had 1st degree tear that was repaired and received cytotec during delivery. pt has had scant to light bleeding in recovery. no reports of pain

## 2023-04-29 NOTE — ANESTHESIA POSTPROCEDURE EVALUATION
Patient: Marisa Case    Procedure Summary     Date: 04/28/23 Room / Location:     Anesthesia Start: 1600 Anesthesia Stop: 2152    Procedure: LABOR ANALGESIA Diagnosis:     Scheduled Providers:  Provider: Jorge Reyes MD    Anesthesia Type: epidural ASA Status: 2          Anesthesia Type: epidural    Vitals  Vitals Value Taken Time   BP 91/42 04/28/23 2231   Temp 37.2 °C (99 °F) 04/28/23 2200   Pulse 93 04/28/23 2231   Resp 18 04/28/23 2218   SpO2 96 % 04/28/23 2221   Vitals shown include unvalidated device data.        Anesthesia Post Evaluation

## 2023-04-29 NOTE — PROGRESS NOTES
[facility]   Obstetrics and Gynecology     2023    Name:Marisa Case   MR#:[id number]    Vaginal Delivery Progress Note    HD#1    Subjective   Postpartum Day 1: 32 y.o. female  delivered at 38w1d  delivered a female  infant.     The patient feels well.  Her pain is controlled.    She is ambulating well.  Patient describes her bleeding as thin lochia.    Breastfeeding/bottle:  The patient is currently breastfeeding.    Patient Active Problem List   Diagnosis   • Class 2 obesity due to excess calories without serious comorbidity with body mass index (BMI) of 37.0 to 37.9 in adult   • Recurrent pregnancy loss   • Rh negative status during pregnancy   • COVID   • Pregnancy, unspecified gestational age   • Maternal anemia in pregnancy, antepartum   • Pregnancy   •  (normal spontaneous vaginal delivery)       Objective   Vital Signs Range for the last 24 hours  Temp: Temp:  [97.6 °F (36.4 °C)-99.7 °F (37.6 °C)] 98 °F (36.7 °C) Temp src: Oral   BP: BP: ()/(42-97) 126/84        Pulse: Heart Rate:  [] 68  RR: Resp:  [16-18] 17  Weight: 112 kg (247 lb)  BMI:  Body mass index is 37.56 kg/m².    Results from last 7 days   Lab Units 23  0613 23  0853   WBC 10*3/mm3 12.18* 9.11   HEMOGLOBIN g/dL 11.2* 11.3*   HEMATOCRIT % 32.4* 33.2*   PLATELETS 10*3/mm3 184 202     Results from last 7 days   Lab Units 23  0853   SODIUM mmol/L 137   POTASSIUM mmol/L 3.6   CHLORIDE mmol/L 105   CO2 mmol/L 20.7*   BUN mg/dL 6   CREATININE mg/dL 0.51*   CALCIUM mg/dL 9.1   ALK PHOS U/L 148*   ALT (SGPT) U/L 20   AST (SGOT) U/L 20   GLUCOSE mg/dL 68       Physical Exam  Vitals and nursing note reviewed.   Constitutional:       Appearance: Normal appearance. She is normal weight.   Pulmonary:      Effort: Pulmonary effort is normal.   Neurological:      Mental Status: She is alert and oriented to person, place, and time.   Psychiatric:         Mood and Affect: Mood normal.          Behavior: Behavior normal.         Assessment/Plan   1.  PPD# 1    Class 2 obesity due to excess calories without serious comorbidity with body mass index (BMI) of 37.0 to 37.9 in adult    Rh negative status during pregnancy    Maternal anemia in pregnancy, antepartum    Pregnancy     (normal spontaneous vaginal delivery)      Plan:   Continue routine postpartum care    Shannan Dewitt MD  2023 10:44 EDT

## 2023-04-29 NOTE — LACTATION NOTE
This note was copied from a baby's chart.  P2, T mew admission. BF other child 1 yr and fed in L&D with no issues. Reviewed Postpartum handbook, how to access videos and OPLC info. Encouraged mom to feed on demand or every 3 hours.

## 2023-04-29 NOTE — L&D DELIVERY NOTE
[facility]   Obstetrics and Gynecology       Vaginal Delivery Note    2023    Patient:Marisa Case    MR#:[id number]    32 y.o. yo female  at 38w1d    Pre-delivery diagnosis:  Intrauterine pregnancy at 38w1d  Premature rupture of membranes  Labor status: Induction of labor       Post-delivery diagnosis:  Same      Patient Active Problem List   Diagnosis   • Class 2 obesity due to excess calories without serious comorbidity with body mass index (BMI) of 37.0 to 37.9 in adult   • Recurrent pregnancy loss   • Rh negative status during pregnancy   • COVID   • Pregnancy, unspecified gestational age   • Maternal anemia in pregnancy, antepartum   • Pregnancy   •  (normal spontaneous vaginal delivery)       Delivery     Delivery: Vaginal, Spontaneous     YOB: 2024    Time of Birth: 9:52 PM      Anesthesia: Epidural     Delivering clinician: Yuly Mixon    Forceps?   No   Vacuum? No    Shoulder dystocia present: No        Delivery narrative:  The patient is admitted for SROM.  Pitocin was started per protocol.  The patient entered an active phase of labor and progressed along a normal labor curve to complete dilatation at +1 station.    The fetal tracing remained predominantly category I with brief and limited category II changes.    With 3 pushes, patient delivered a live viable female infant occiput anterior with restitution to occiput left.  The anterior and posterior shoulder delivered without difficulty.  A double nuchal cord was identified and gently reduced. The body was delivered atraumatically.  The infant's nose and oropharynx were suctioned and cleared of secretions.  Cord clamping was delayed a minimum of 30 seconds then was clamped and cut.  The infant was placed on the mom's chest for bonding and care.    The placenta was expressed and delivered intact.      EBL:    mL  Quant EBL:  Quantitative Blood Loss (mL): 225 mL      Summary:  Uncomplicated  Vaginal delivery      Infant    Findings: female  infant     Infant observations: Weight: No birth weight on file.     Observations/Comments:  scale #4      Apgars: 8  @ 1 minute /    9  @ 5 minutes   Infant Name: Genevive     Placenta, Cord, and Fluid    Placenta delivered  Expressed  at  4/28/2023  9:58 PM     Cord: 3 vessels  present.   Nuchal Cord?  yes; Number of nuchal loops present:  2    Cord blood obtained: Yes    Cord gases obtained:  No    Cord gas results: No results found for: PHCVEN         Repair    Episiotomy: No   Lacerations: Yes  Laceration Information  Laceration Repaired?   Perineal: 1st  Yes    Periurethral:       Labial:       Sulcus:       Vaginal:       Cervical:         Suture used for repair: 3-0 Vicryl   Estimated Blood Loss:   mL         Complications  none    Disposition  Mother to Mother Baby/Postpartum  in stable condition currently.  Baby to remains with mom  in stable condition currently.    [x]  Labs reviewed:  Bld Rh neg   Rubella immune  Varicella immune    Immunization History   Administered Date(s) Administered   • COVID-19 (PFIZER) Purple Cap Monovalent 03/29/2021, 04/19/2021   • Flu Vaccine Intradermal Quad 18-64YR 10/10/2019   • Flu Vaccine Quad PF >36MO 01/21/2015   • FluLaval/Fluzone >6mos 10/08/2021, 10/18/2022   • Rho (D) Immune Globulin 02/07/2018, 11/20/2018, 10/10/2019, 01/03/2020, 01/17/2022, 02/20/2023   • Tdap 02/25/2015, 10/10/2019, 02/20/2023   • flucelvax quad pfs =>4 YRS 10/10/2019         Yuly Mixon MD  04/28/23  22:38 EDT

## 2023-04-30 VITALS
OXYGEN SATURATION: 96 % | TEMPERATURE: 98 F | HEART RATE: 81 BPM | RESPIRATION RATE: 16 BRPM | BODY MASS INDEX: 37.56 KG/M2 | DIASTOLIC BLOOD PRESSURE: 81 MMHG | WEIGHT: 247 LBS | SYSTOLIC BLOOD PRESSURE: 122 MMHG

## 2023-04-30 PROCEDURE — 0503F POSTPARTUM CARE VISIT: CPT | Performed by: OBSTETRICS & GYNECOLOGY

## 2023-04-30 RX ORDER — IBUPROFEN 800 MG/1
800 TABLET ORAL EVERY 8 HOURS PRN
Qty: 30 TABLET | Refills: 1 | Status: SHIPPED | OUTPATIENT
Start: 2023-04-30

## 2023-04-30 RX ADMIN — DOCUSATE SODIUM 100 MG: 100 CAPSULE, LIQUID FILLED ORAL at 08:26

## 2023-04-30 NOTE — LACTATION NOTE
P2T. Baby nursing well per Mom. She nursed her first baby successfully and has a PBP. LC contact numbers provided if needs arise.

## 2023-04-30 NOTE — DISCHARGE SUMMARY
[facility]   Obstetrics and Gynecology   Vaginal delivery Discharge Summary      Date of Admission: 2023    Date of Discharge:  2023      Patient: Marisa Case      MR#:[id number]    Surgeon/OB: Yuly Mixon     Discharge Diagnosis:   Vaginal Delivery at 38w1d, uncomplicated recovery     (normal spontaneous vaginal delivery)    Class 2 obesity due to excess calories without serious comorbidity with body mass index (BMI) of 37.0 to 37.9 in adult    Rh negative status during pregnancy    Maternal anemia in pregnancy, antepartum    Pregnancy      Procedures:  Vaginal, Spontaneous     2023    9:52 PM      Anesthesia:  Epidural     Presenting Problem/History of Present Illness  Pregnancy [Z34.90]     Patient Active Problem List   Diagnosis   • Class 2 obesity due to excess calories without serious comorbidity with body mass index (BMI) of 37.0 to 37.9 in adult   • Recurrent pregnancy loss   • Rh negative status during pregnancy   • COVID   • Pregnancy, unspecified gestational age   • Maternal anemia in pregnancy, antepartum   • Pregnancy   •  (normal spontaneous vaginal delivery)       Hospital Course  Patient is a 32 y.o. female  at 38w1d status post vaginal delivery.    Uneventful recovery.  Patient is ambulating, tolerating a regular diet.  Perineum is intact.    Infant:   female  fetus 2835 g (6 lb 4 oz)  with Apgar scores of 8 , 9  at five minutes.    Condition on Discharge:  Stable    Vital Signs  Temp:  [97.7 °F (36.5 °C)-98 °F (36.7 °C)] 98 °F (36.7 °C)  Heart Rate:  [69-81] 81  Resp:  [16] 16  BP: (115-122)/(75-81) 122/81    Results from last 7 days   Lab Units 23  0613 23  0853   WBC 10*3/mm3 12.18* 9.11   HEMOGLOBIN g/dL 11.2* 11.3*   HEMATOCRIT % 32.4* 33.2*   PLATELETS 10*3/mm3 184 202     Results from last 7 days   Lab Units 23  0853   SODIUM mmol/L 137   POTASSIUM mmol/L 3.6   CHLORIDE mmol/L 105   CO2 mmol/L 20.7*   BUN mg/dL 6    CREATININE mg/dL 0.51*   CALCIUM mg/dL 9.1   BILIRUBIN mg/dL 0.2   ALK PHOS U/L 148*   ALT (SGPT) U/L 20   AST (SGOT) U/L 20   GLUCOSE mg/dL 68         Discharge Disposition  Home or Self Care    Discharge Medications     Discharge Medications      New Medications      Instructions Start Date   ibuprofen 800 MG tablet  Commonly known as: ADVIL,MOTRIN   800 mg, Oral, Every 8 Hours PRN         Continue These Medications      Instructions Start Date   cetirizine 10 MG tablet  Commonly known as: zyrTEC   10 mg, Oral, Daily      ferrous sulfate 325 (65 FE) MG tablet   325 mg, Oral, Daily      PNV PO   Oral         Stop These Medications    aspirin 81 MG EC tablet            Discharge Diet: Regular    Activity at Discharge:   Activity Instructions     Pelvic Rest            Follow-up Appointments  Future Appointments   Date Time Provider Department Center   5/9/2023  6:00 AM [facility] LD INDUCTION [facility]     Additional Instructions for the Follow-ups that You Need to Schedule     Call MD for problems / concerns.   As directed      Call for problems with:   1.  Heavy bleeding:  Greater than a pad an hour for more than 2 hours  2.  Fever greater than 101.3   3.  Redness of the episiotomy or vaginal laceration and/or severe pain increased from discharge pain.    4.  Signs of postpartum preeclampsia:  headache, visual changes, elevated blood pressure    Order Comments: Call for problems with: 1.  Heavy bleeding:  Greater than a pad an hour for more than 2 hours 2.  Fever greater than 101.3 3.  Redness of the episiotomy or vaginal laceration and/or severe pain increased from discharge pain.  4.  Signs of postpartum preeclampsia:  headache, visual changes, elevated blood pressure                  Prenatal labs/vax:   Immunization History   Administered Date(s) Administered   • COVID-19 (PFIZER) Purple Cap Monovalent 03/29/2021, 04/19/2021   • Flu Vaccine Intradermal Quad 18-64YR 10/10/2019   • Flu Vaccine Quad PF  >36MO 01/21/2015   • FluLaval/Fluzone >6mos 10/08/2021, 10/18/2022   • Rho (D) Immune Globulin 02/07/2018, 11/20/2018, 10/10/2019, 01/03/2020, 01/17/2022, 02/20/2023, 04/29/2023   • Tdap 02/25/2015, 10/10/2019, 02/20/2023   • flucelvax quad pfs =>4 YRS 10/10/2019         External Prenatal Results     Pregnancy Outside Results - Transcribed From Office Records - See Scanned Records For Details     Test Value Date Time    ABO  A  04/28/23 2323    Rh  Negative  04/28/23 2323    Antibody Screen  Negative  04/28/23 0853       Negative  02/20/23 1143       Negative  10/18/22 1208    Varicella IgG ^ Pos H/O  10/18/22     Rubella  5.71 index 10/18/22 1208    Hgb  11.2 g/dL 04/29/23 0613       11.3 g/dL 04/28/23 0853       11.6 g/dL 03/30/23 1015       10.7 g/dL 02/20/23 1143       14.0 g/dL 10/18/22 1208    Hct  32.4 % 04/29/23 0613       33.2 % 04/28/23 0853       34.3 % 03/30/23 1015       31.7 % 02/20/23 1143       41.8 % 10/18/22 1208    Glucose Fasting GTT       Glucose Tolerance Test 1 hour       Glucose Tolerance Test 3 hour       Gonorrhea (discrete)  Negative  06/06/19 1352    Chlamydia (discrete)  Negative  06/06/19 1352    RPR  Non Reactive  10/18/22 1208    VDRL       Syphilis Antibody       HBsAg  Negative  10/18/22 1208    Herpes Simplex Virus PCR       Herpes Simplex VIrus Culture       HIV  Non Reactive  10/18/22 1208    Hep C RNA Quant PCR       Hep C Antibody  <0.1 s/co ratio 10/18/22 1208    AFP  33.0 ng/mL 12/12/22 1528    Group B Strep  Negative  04/17/23 1634    GBS Susceptibility to Clindamycin       GBS Susceptibility to Erythromycin       Fetal Fibronectin       Genetic Testing, Maternal Blood             Drug Screening     Test Value Date Time    Urine Drug Screen       Amphetamine Screen       Barbiturate Screen       Benzodiazepine Screen       Methadone Screen       Phencyclidine Screen       Opiates Screen       THC Screen       Cocaine Screen       Propoxyphene Screen       Buprenorphine  Screen       Methamphetamine Screen       Oxycodone Screen       Tricyclic Antidepressants Screen             Legend    ^: Historical                          Narinder Sloan MD  04/30/23  16:21 EDT

## 2023-04-30 NOTE — PROGRESS NOTES
[facility]   Obstetrics and Gynecology     2023    Name:Marisa Case   MR#:[id number]    Vaginal Delivery Progress Note    HD#2    Subjective   Postpartum Day 2: 32 y.o. female  delivered at 38w1d  delivered a female  infant.     The patient feels well.  Her pain is controlled.    She is ambulating well.  Patient describes her bleeding as thin lochia.    Breastfeeding/bottle:  The patient is currently breastfeeding.    Patient Active Problem List   Diagnosis   • Class 2 obesity due to excess calories without serious comorbidity with body mass index (BMI) of 37.0 to 37.9 in adult   • Recurrent pregnancy loss   • Rh negative status during pregnancy   • COVID   • Pregnancy, unspecified gestational age   • Maternal anemia in pregnancy, antepartum   • Pregnancy   •  (normal spontaneous vaginal delivery)       Objective   Vital Signs Range for the last 24 hours  Temp: Temp:  [97.7 °F (36.5 °C)-98.1 °F (36.7 °C)] 98 °F (36.7 °C) Temp src: Oral   BP: BP: (115-123)/(75-83) 122/81        Pulse: Heart Rate:  [69-81] 81  RR: Resp:  [16-17] 16  Weight: 112 kg (247 lb)  BMI:  Body mass index is 37.56 kg/m².    Results from last 7 days   Lab Units 23  0613 23  0853   WBC 10*3/mm3 12.18* 9.11   HEMOGLOBIN g/dL 11.2* 11.3*   HEMATOCRIT % 32.4* 33.2*   PLATELETS 10*3/mm3 184 202     Results from last 7 days   Lab Units 23  0853   SODIUM mmol/L 137   POTASSIUM mmol/L 3.6   CHLORIDE mmol/L 105   CO2 mmol/L 20.7*   BUN mg/dL 6   CREATININE mg/dL 0.51*   CALCIUM mg/dL 9.1   ALK PHOS U/L 148*   ALT (SGPT) U/L 20   AST (SGOT) U/L 20   GLUCOSE mg/dL 68       Physical Exam  Vitals and nursing note reviewed.   Constitutional:       General: She is not in acute distress.     Appearance: Normal appearance. She is well-developed. She is not ill-appearing.   HENT:      Head: Normocephalic.   Pulmonary:      Effort: Pulmonary effort is normal.   Abdominal:      General: Abdomen is flat. There  is no distension.      Palpations: Abdomen is soft. There is no mass.      Tenderness: There is no abdominal tenderness.   Genitourinary:     Vagina: Normal.      Comments: Lochia is scant  Fundus is firm   Musculoskeletal:         General: No swelling or tenderness.      Comments: No calf tenderness or swelling    Neurological:      Mental Status: She is alert and oriented to person, place, and time.   Psychiatric:         Behavior: Behavior normal.         Thought Content: Thought content normal.         Judgment: Judgment normal.         Assessment/Plan   1.  PPD# 2    Class 2 obesity due to excess calories without serious comorbidity with body mass index (BMI) of 37.0 to 37.9 in adult    Rh negative status during pregnancy    Maternal anemia in pregnancy, antepartum    Pregnancy     (normal spontaneous vaginal delivery)      Plan:   Discharge today     Narinder Sloan MD  2023 09:23 EDT

## 2023-05-04 ENCOUNTER — HOSPITAL ENCOUNTER (OUTPATIENT)
Dept: LACTATION | Facility: HOSPITAL | Age: 33
Discharge: HOME | End: 2023-05-04

## 2023-05-04 NOTE — LACTATION NOTE
Jazmine here for latch help. Baby refusing right breast. Baby is very jaundiced and sleepy. Only wakes for a brief moment or two. When parents were packing up to go baby woke up crying and patient was able to latch her to the left breast.   BW6-4  LW 5-9   today 5-10.9 in dry diaper.   Mom has a Spectra pump and LC enc her to pump and feed if baby is this sleepy at feedings. Offer breast first then used paced feeding.   Lactation Consult Note    Evaluation Completed: 2023 09:11 EDT  Patient Name: Marisa Case  :  1990  MRN:  [id number]     REFERRAL  INFORMATION:                          Date of Referral: 23   Person Making Referral: patient  Maternal Reason for Referral: breastfeeding currently  Infant Reason for Referral: hyperbilirubinemia, low birth weight    DELIVERY HISTORY:        Skin to skin initiation date/time: 2023  9:54 PM   Skin to skin end date/time: 2023  11:15 PM        MATERNAL ASSESSMENT:     Breast Shape: pendulous (23)  Breast Density: full (23)  Areola: elastic (23)  Nipples: everted (23)  Nipple Width: 1.0 cm (23)     Right Nipple Symptoms: intact (23)       INFANT ASSESSMENT:  Information for the patient's :  Zay Case [[id number]]   No past medical history on file.                                                                                                     MATERNAL INFANT FEEDING:     Maternal Emotional State: receptive (23)  Infant Positioning: clutch/football (23)                  Milk Ejection Reflex: present (23)           Latch Assistance: none needed (23)                               EQUIPMENT TYPE:  Breast Pump Type: double electric, personal (23)                              BREAST PUMPING:          LACTATION REFERRALS:

## 2023-06-12 ENCOUNTER — POSTPARTUM VISIT (OUTPATIENT)
Dept: OBSTETRICS AND GYNECOLOGY | Age: 33
End: 2023-06-12
Payer: COMMERCIAL

## 2023-06-12 VITALS
SYSTOLIC BLOOD PRESSURE: 108 MMHG | BODY MASS INDEX: 32.43 KG/M2 | DIASTOLIC BLOOD PRESSURE: 74 MMHG | HEIGHT: 68 IN | WEIGHT: 214 LBS

## 2023-06-12 PROBLEM — O26.899 RH NEGATIVE STATUS DURING PREGNANCY: Status: RESOLVED | Noted: 2022-10-18 | Resolved: 2023-06-12

## 2023-06-12 PROBLEM — O99.019 MATERNAL ANEMIA IN PREGNANCY, ANTEPARTUM: Status: RESOLVED | Noted: 2023-02-21 | Resolved: 2023-06-12

## 2023-06-12 PROBLEM — Z67.91 RH NEGATIVE STATUS DURING PREGNANCY: Status: RESOLVED | Noted: 2022-10-18 | Resolved: 2023-06-12

## 2023-06-12 PROBLEM — Z34.90 PREGNANCY, UNSPECIFIED GESTATIONAL AGE: Status: RESOLVED | Noted: 2023-02-20 | Resolved: 2023-06-12

## 2023-06-12 PROBLEM — Z34.90 PREGNANCY: Status: RESOLVED | Noted: 2023-04-28 | Resolved: 2023-06-12

## 2023-06-12 PROBLEM — U07.1 COVID: Status: RESOLVED | Noted: 2022-10-19 | Resolved: 2023-06-12

## 2023-06-12 PROCEDURE — 0503F POSTPARTUM CARE VISIT: CPT | Performed by: OBSTETRICS & GYNECOLOGY

## 2023-06-12 NOTE — PROGRESS NOTES
"Subjective   Marisa Case is a 32 y.o. female who presents for a postpartum visit. She is 6 weeks postpartum following a spontaneous vaginal delivery. I have fully reviewed the prenatal and intrapartum course. Postpartum course has been uneventful. Baby is feeding by breast. Bleeding has been normal in amount and decreasing. Bowel function is normal. Bladder function is normal. Patient not sexually active at this time. Contraception method is discussed. Postpartum depression screening: negative.    The following portions of the patient's history were reviewed and updated as appropriate: allergies, current medications,and problem list.    Review of Systems  Pertinent items are noted in HPI.    Objective   /74   Ht 172.7 cm (68\")   Wt 97.1 kg (214 lb)   LMP 2022   Breastfeeding Yes   BMI 32.54 kg/m²    General:  Alert and oriented, NAD    Breasts:         Heart:     Abdomen: Normal findings, nontender    Vulva: Normal, well-healed    Vagina: No lesions or abnormal discharge   Cervix:  Normal with no cervical motion tenderness   Corpus: Normal for post partum visit   Adnexa:  Non tender, non enlarged         Assessment & Plan     Normal postpartum exam. Pap smear not done at today's visit.    1. Contraception: condoms vas planned   2. Slow return to normal activities reviewed. Continue prenatal vitamins.  3. Follow up in 12 months or sooner as needed.         Answers submitted by the patient for this visit:  Other (Submitted on 2023)  Please describe your symptoms.:  appointment  Have you had these symptoms before?: Yes  How long have you been having these symptoms?: Greater than 2 weeks  Please list any medications you are currently taking for this condition.: Prenatal vitamins  Please describe any probable cause for these symptoms. :  appointment  Primary Reason for Visit (Submitted on 2023)  What is the primary reason for your visit?: Other    "

## 2024-07-19 ENCOUNTER — OFFICE VISIT (OUTPATIENT)
Dept: OBSTETRICS AND GYNECOLOGY | Age: 34
End: 2024-07-19
Payer: COMMERCIAL

## 2024-07-19 DIAGNOSIS — Z01.419 ENCOUNTER FOR GYNECOLOGICAL EXAMINATION WITHOUT ABNORMAL FINDING: Primary | ICD-10-CM

## 2024-07-19 PROCEDURE — 99395 PREV VISIT EST AGE 18-39: CPT | Performed by: OBSTETRICS & GYNECOLOGY

## 2024-07-22 VITALS
WEIGHT: 221 LBS | DIASTOLIC BLOOD PRESSURE: 78 MMHG | BODY MASS INDEX: 33.49 KG/M2 | HEIGHT: 68 IN | SYSTOLIC BLOOD PRESSURE: 108 MMHG

## 2024-07-22 NOTE — PROGRESS NOTES
"Subjective     Chief Complaint   Patient presents with    Gynecologic Exam     AC     Please also see written notes under media tab.  Patient was seen on Friday when there was a nationwide computer outage and we were unable to document in epic.    History of Present Illness    Marisa Case is a 33 y.o.  who presents for annual exam.  Patient reports that her kids are doing well.  They are ages 1 and 4.  Her cycles are regular.  Pap smear is up-to-date.  Her menses are regular every 28-30 days, lasting 4-7 days, dysmenorrhea none   Obstetric History:  OB History          5    Para   2    Term   2       0    AB   3    Living   2         SAB   2    IAB   0    Ectopic   0    Molar   1    Multiple   0    Live Births   2               Menstrual History:     Patient's last menstrual period was 2024.         Current contraception: vasectomy  History of abnormal Pap smear: no  Received Gardasil immunization: yes  Perform regular self breast exam : no  Family history of uterine or ovarian cancer: no  Family History of colon cancer: no  Family history of breast cancer: no      Exercise: Patient walks daily for 30 minutes  Calcium/Vitamin D: adequate intake    The following portions of the patient's history were reviewed and updated as appropriate: allergies, current medications, past family history, past medical history, past social history, past surgical history, and problem list.    Review of Systems    Review of Systems   Constitutional: Negative for fatigue.   Respiratory: Negative for shortness of breath.    Gastrointestinal: Negative for abdominal pain.   Genitourinary: Negative for dysuria.   Neurological: Negative for headaches.   Psychiatric/Behavioral: Negative for dysphoric mood.     Objective   Physical Exam    /78   Ht 172.7 cm (68\")   Wt 100 kg (221 lb)   LMP 2024   Breastfeeding No   BMI 33.60 kg/m²     General:   alert, appears stated age and cooperative   Neck: " thyroid normal to palpation   Heart: regular rate and rhythm   Lungs: clear to auscultation bilaterally   Abdomen: soft, non-tender, without masses or organomegaly   Breast: inspection negative, no nipple discharge or bleeding, no masses or nodularity palpable   Vulva: normal, Bartholin's, Urethra, Tremont's normal   Vagina: normal mucosa, normal discharge   Cervix: no cervical motion tenderness and no lesions   Uterus: non-tender, normal shape and consistency   Adnexa: no mass, fullness, tenderness   Rectal: not indicated     Assessment & Plan   Diagnoses and all orders for this visit:    1. Encounter for gynecological examination without abnormal finding (Primary)      Patient is doing well overall.  She will call with any problems and see me in 1 year.  She has good exercise habits.  All questions answered.  Breast self exam technique reviewed and patient encouraged to perform self-exam monthly.  Discussed healthy lifestyle modifications.  Recommended 30 minutes of aerobic exercise five times per week.  Discussed calcium needs to prevent osteoporosis.

## 2024-08-02 ENCOUNTER — OFFICE VISIT (OUTPATIENT)
Dept: FAMILY MEDICINE CLINIC | Facility: CLINIC | Age: 34
End: 2024-08-02
Payer: COMMERCIAL

## 2024-08-02 VITALS
HEIGHT: 69 IN | HEART RATE: 73 BPM | DIASTOLIC BLOOD PRESSURE: 83 MMHG | TEMPERATURE: 98.5 F | SYSTOLIC BLOOD PRESSURE: 123 MMHG | OXYGEN SATURATION: 98 % | BODY MASS INDEX: 33.77 KG/M2 | WEIGHT: 228 LBS

## 2024-08-02 DIAGNOSIS — Z00.00 PREVENTATIVE HEALTH CARE: Primary | ICD-10-CM

## 2024-08-02 DIAGNOSIS — E66.09 CLASS 1 OBESITY DUE TO EXCESS CALORIES WITHOUT SERIOUS COMORBIDITY WITH BODY MASS INDEX (BMI) OF 34.0 TO 34.9 IN ADULT: ICD-10-CM

## 2024-08-02 PROCEDURE — 99395 PREV VISIT EST AGE 18-39: CPT | Performed by: NURSE PRACTITIONER

## 2024-08-02 NOTE — PROGRESS NOTES
Chief Complaint  Annual Exam and re-established patient    Subjective        Marisa Case is here to establish care  History of Present Illness  Marisa is a 33-year-old female presenting today to establish care.  She has no concerns she wants to discuss today.      Previous PCP: Dr. Vega  Specialist: obgyn: Dr. Valdez  Marital Status:   Children: 2  Occupation: /  Exercise: walks 5 days a week  Diet: balanced  Tobacco use: denies  Alcohol use: occasional  Recreational drug use: denies    Routine Health Maintenance:  Dental: 2023  Vision: A couple years ago  Pap Smear: . Q3 years. normal    Vaccines:  Influenza: UTD  COVID: UTD  Tdap: UTD      The following portions of the patient's history were reviewed and updated as appropriate: allergies, current medications, past family history, past medical history, past social history, past surgical history and problem list.    Allergies   Allergen Reactions    Penicillins Hives         Current Outpatient Medications:     cetirizine (zyrTEC) 10 MG tablet, Take 1 tablet by mouth Daily., Disp: , Rfl:     multivitamin with minerals (MULTIVITAL PO), Take 1 tablet by mouth Daily., Disp: , Rfl:     Family History   Problem Relation Age of Onset    No Known Problems Mother     No Known Problems Father     Cancer Maternal Grandfather     Malig Hyperthermia Neg Hx         Past Medical History:   Diagnosis Date    Allergic 1989    Penicillin, General Seasonal Allergies    Chickenpox     as a child    COVID 10/19/2022    COVID-19 virus infection 01/10/2022    TESTED THROUGH [facility]    Dysmenorrhea     History of medical problems 2018    History if Miscarriage and Partial Molar Pregnancy    Incomplete  2022    Miscarriage     Molar pregnancy     Rh negative status during pregnancy 10/18/2022       Social History     Socioeconomic History    Marital status:      Spouse name: Dakota   Tobacco Use    Smoking  "status: Never    Smokeless tobacco: Never   Vaping Use    Vaping status: Never Used   Substance and Sexual Activity    Alcohol use: Yes     Comment: occasional    Drug use: No    Sexual activity: Yes     Partners: Male     Birth control/protection: Vasectomy     Comment: Currently trying to conceive        Past Surgical History:   Procedure Laterality Date    D & C WITH SUCTION N/A 2/7/2018    Procedure: DILATATION AND CURETTAGE WITH SUCTION;  Surgeon: Marcelo Valdez MD;  Location: [facility];  Service:     D & C WITH SUCTION N/A 1/22/2022    Procedure: DILATATION AND CURETTAGE WITH SUCTION;  Surgeon: Marcelo Valdez MD;  Location: [facility] OR;  Service: Obstetrics/Gynecology;  Laterality: N/A;    WISDOM TOOTH EXTRACTION          Patient Active Problem List   Diagnosis    Class 2 obesity due to excess calories without serious comorbidity with body mass index (BMI) of 37.0 to 37.9 in adult    Recurrent pregnancy loss       Immunization History   Administered Date(s) Administered    COVID-19 (PFIZER) Purple Cap Monovalent 03/29/2021, 04/19/2021    Flu Vaccine Intradermal Quad 18-64YR 10/10/2019    Flu Vaccine Quad PF >36MO 01/21/2015    Fluzone (or Fluarix & Flulaval for VFC) >6mos 10/08/2021, 10/18/2022    Rho (D) Immune Globulin 02/07/2018, 11/20/2018, 10/10/2019, 01/03/2020, 01/17/2022, 02/20/2023, 04/29/2023    Tdap 02/25/2015, 10/10/2019, 02/20/2023    flucelvax quad pfs =>4 YRS 10/10/2019         Objective   Vital Signs:  /83 (BP Location: Left arm, Patient Position: Sitting, Cuff Size: Large Adult)   Pulse 73   Temp 98.5 °F (36.9 °C) (Tympanic)   Ht 174 cm (68.5\")   Wt 103 kg (228 lb)   SpO2 98%   BMI 34.16 kg/m²   Estimated body mass index is 34.16 kg/m² as calculated from the following:    Height as of this encounter: 174 cm (68.5\").    Weight as of this encounter: 103 kg (228 lb).       BMI is >= 30 and <35. (Class 1 Obesity). The following options were offered after discussion;: exercise " counseling/recommendations and nutrition counseling/recommendations      Review of Systems   Constitutional:  Negative for activity change, appetite change, chills, fatigue, fever and unexpected weight change.   HENT:  Negative for congestion, rhinorrhea, sneezing and sore throat.    Eyes:  Negative for visual disturbance.   Respiratory:  Negative for cough, shortness of breath and wheezing.    Cardiovascular:  Negative for chest pain.   Gastrointestinal:  Negative for abdominal pain, constipation, diarrhea, nausea and vomiting.   Genitourinary:  Negative for difficulty urinating and dysuria.   Musculoskeletal:  Negative for arthralgias, gait problem and myalgias.   Skin:  Negative for color change, rash and wound.   Neurological:  Negative for dizziness, weakness, light-headedness, numbness and headaches.   Psychiatric/Behavioral:  Negative for self-injury, sleep disturbance and suicidal ideas. The patient is not nervous/anxious.      Physical Exam  Constitutional:       Appearance: Normal appearance.   HENT:      Head: Normocephalic and atraumatic.      Right Ear: Tympanic membrane, ear canal and external ear normal.      Left Ear: Tympanic membrane, ear canal and external ear normal.      Nose: Nose normal.      Mouth/Throat:      Mouth: Mucous membranes are moist.      Pharynx: Oropharynx is clear.   Eyes:      Extraocular Movements: Extraocular movements intact.      Conjunctiva/sclera: Conjunctivae normal.      Pupils: Pupils are equal, round, and reactive to light.   Cardiovascular:      Rate and Rhythm: Normal rate and regular rhythm.      Pulses: Normal pulses.      Heart sounds: Normal heart sounds.   Pulmonary:      Effort: Pulmonary effort is normal.      Breath sounds: Normal breath sounds.   Abdominal:      General: Abdomen is flat. Bowel sounds are normal.      Palpations: Abdomen is soft.   Musculoskeletal:         General: Normal range of motion.      Cervical back: Normal range of motion and neck  supple.   Skin:     General: Skin is warm and dry.      Capillary Refill: Capillary refill takes less than 2 seconds.   Neurological:      Mental Status: She is alert and oriented to person, place, and time.   Psychiatric:         Mood and Affect: Mood normal.         Behavior: Behavior normal.         Thought Content: Thought content normal.         Judgment: Judgment normal.        Result Review :                   Assessment and Plan   Diagnoses and all orders for this visit:    1. Preventative health care (Primary)  Comments:  Overall healthy 33-year-old female.  Labs ordered today.  Will return when fasting.  UTD on immunizations.  Follow-up in 1 year or sooner if needed.  Orders:  -     Comprehensive Metabolic Panel  -     Hemoglobin A1c  -     Lipid Panel  -     T4, Free  -     TSH  -     Vitamin D,25-Hydroxy  -     CBC & Differential  -     Hepatitis C Antibody    2. Class 1 obesity due to excess calories without serious comorbidity with body mass index (BMI) of 34.0 to 34.9 in adult  Comments:  Continue working on diet and exercise.  Recommended least 150 minutes of physical activity per week.             Follow Up   Return in about 1 year (around 8/2/2025).  Patient was given instructions and counseling regarding her condition or for health maintenance advice. Please see specific information pulled into the AVS if appropriate.

## 2024-08-08 ENCOUNTER — LAB (OUTPATIENT)
Dept: FAMILY MEDICINE CLINIC | Facility: CLINIC | Age: 34
End: 2024-08-08
Payer: COMMERCIAL

## 2024-08-08 LAB
25(OH)D3 SERPL-MCNC: 37.7 NG/ML (ref 30–100)
ALBUMIN SERPL-MCNC: 4.3 G/DL (ref 3.5–5.2)
ALBUMIN/GLOB SERPL: 1.4 G/DL
ALP SERPL-CCNC: 44 U/L (ref 39–117)
ALT SERPL W P-5'-P-CCNC: 18 U/L (ref 1–33)
ANION GAP SERPL CALCULATED.3IONS-SCNC: 9 MMOL/L (ref 5–15)
AST SERPL-CCNC: 15 U/L (ref 1–32)
BASOPHILS # BLD AUTO: 0.03 10*3/MM3 (ref 0–0.2)
BASOPHILS NFR BLD AUTO: 0.7 % (ref 0–1.5)
BILIRUB SERPL-MCNC: 0.2 MG/DL (ref 0–1.2)
BUN SERPL-MCNC: 10 MG/DL (ref 6–20)
BUN/CREAT SERPL: 15.9 (ref 7–25)
CALCIUM SPEC-SCNC: 9.5 MG/DL (ref 8.6–10.5)
CHLORIDE SERPL-SCNC: 107 MMOL/L (ref 98–107)
CHOLEST SERPL-MCNC: 145 MG/DL (ref 0–200)
CO2 SERPL-SCNC: 27 MMOL/L (ref 22–29)
CREAT SERPL-MCNC: 0.63 MG/DL (ref 0.57–1)
DEPRECATED RDW RBC AUTO: 41.7 FL (ref 37–54)
EGFRCR SERPLBLD CKD-EPI 2021: 119.6 ML/MIN/1.73
EOSINOPHIL # BLD AUTO: 0.07 10*3/MM3 (ref 0–0.4)
EOSINOPHIL NFR BLD AUTO: 1.6 % (ref 0.3–6.2)
ERYTHROCYTE [DISTWIDTH] IN BLOOD BY AUTOMATED COUNT: 12.5 % (ref 12.3–15.4)
GLOBULIN UR ELPH-MCNC: 3 GM/DL
GLUCOSE SERPL-MCNC: 84 MG/DL (ref 65–99)
HBA1C MFR BLD: 5 % (ref 4.8–5.6)
HCT VFR BLD AUTO: 40.2 % (ref 34–46.6)
HCV AB SER QL: NORMAL
HDLC SERPL-MCNC: 41 MG/DL (ref 40–60)
HGB BLD-MCNC: 13.5 G/DL (ref 12–15.9)
IMM GRANULOCYTES # BLD AUTO: 0.01 10*3/MM3 (ref 0–0.05)
IMM GRANULOCYTES NFR BLD AUTO: 0.2 % (ref 0–0.5)
LDLC SERPL CALC-MCNC: 88 MG/DL (ref 0–100)
LDLC/HDLC SERPL: 2.15 {RATIO}
LYMPHOCYTES # BLD AUTO: 1.52 10*3/MM3 (ref 0.7–3.1)
LYMPHOCYTES NFR BLD AUTO: 34 % (ref 19.6–45.3)
MCH RBC QN AUTO: 30.8 PG (ref 26.6–33)
MCHC RBC AUTO-ENTMCNC: 33.6 G/DL (ref 31.5–35.7)
MCV RBC AUTO: 91.8 FL (ref 79–97)
MONOCYTES # BLD AUTO: 0.24 10*3/MM3 (ref 0.1–0.9)
MONOCYTES NFR BLD AUTO: 5.4 % (ref 5–12)
NEUTROPHILS NFR BLD AUTO: 2.6 10*3/MM3 (ref 1.7–7)
NEUTROPHILS NFR BLD AUTO: 58.1 % (ref 42.7–76)
NRBC BLD AUTO-RTO: 0 /100 WBC (ref 0–0.2)
PLATELET # BLD AUTO: 273 10*3/MM3 (ref 140–450)
PMV BLD AUTO: 9.5 FL (ref 6–12)
POTASSIUM SERPL-SCNC: 4.1 MMOL/L (ref 3.5–5.2)
PROT SERPL-MCNC: 7.3 G/DL (ref 6–8.5)
RBC # BLD AUTO: 4.38 10*6/MM3 (ref 3.77–5.28)
SODIUM SERPL-SCNC: 143 MMOL/L (ref 136–145)
T4 FREE SERPL-MCNC: 1.26 NG/DL (ref 0.92–1.68)
TRIGL SERPL-MCNC: 80 MG/DL (ref 0–150)
TSH SERPL DL<=0.05 MIU/L-ACNC: 1.52 UIU/ML (ref 0.27–4.2)
VLDLC SERPL-MCNC: 16 MG/DL (ref 5–40)
WBC NRBC COR # BLD AUTO: 4.47 10*3/MM3 (ref 3.4–10.8)

## 2024-08-08 PROCEDURE — 84439 ASSAY OF FREE THYROXINE: CPT | Performed by: NURSE PRACTITIONER

## 2024-08-08 PROCEDURE — 80053 COMPREHEN METABOLIC PANEL: CPT | Performed by: NURSE PRACTITIONER

## 2024-08-08 PROCEDURE — 84443 ASSAY THYROID STIM HORMONE: CPT | Performed by: NURSE PRACTITIONER

## 2024-08-08 PROCEDURE — 86803 HEPATITIS C AB TEST: CPT | Performed by: NURSE PRACTITIONER

## 2024-08-08 PROCEDURE — 85025 COMPLETE CBC W/AUTO DIFF WBC: CPT | Performed by: NURSE PRACTITIONER

## 2024-08-08 PROCEDURE — 80061 LIPID PANEL: CPT | Performed by: NURSE PRACTITIONER

## 2024-08-08 PROCEDURE — 83036 HEMOGLOBIN GLYCOSYLATED A1C: CPT | Performed by: NURSE PRACTITIONER

## 2024-08-08 PROCEDURE — 82306 VITAMIN D 25 HYDROXY: CPT | Performed by: NURSE PRACTITIONER

## 2024-08-09 ENCOUNTER — PATIENT ROUNDING (BHMG ONLY) (OUTPATIENT)
Dept: FAMILY MEDICINE CLINIC | Facility: CLINIC | Age: 34
End: 2024-08-09
Payer: COMMERCIAL

## (undated) DEVICE — Device

## (undated) DEVICE — OSC HYSTEROSCOPY: Brand: MEDLINE INDUSTRIES, INC.

## (undated) DEVICE — VACURETTE CRV RIGD 8MM DISP

## (undated) DEVICE — ST COL BERKELY TBG

## (undated) DEVICE — VACURETTE CRV RIGD 10MM DISP

## (undated) DEVICE — CANN VAC GYN VACURETTE BERKELEY CRV 8MM 1P/U STRL

## (undated) DEVICE — CANSTR SXN UTER NO FLTR SURG

## (undated) DEVICE — STRAP STIRUP WO/ RNG

## (undated) DEVICE — LOU D & C HYSTEROSCOPY: Brand: MEDLINE INDUSTRIES, INC.

## (undated) DEVICE — PAD GRND REM POLYHESIVE A/ DISP

## (undated) DEVICE — SACK GZ PERM

## (undated) DEVICE — HOSE BT TO BT VAC CURETTAGE SNGL PT USE

## (undated) DEVICE — TBG W FLTR FOR BERKELEY SYSTEM

## (undated) DEVICE — GLV SURG TRIUMPH CLASSIC PF LTX 6 STRL

## (undated) DEVICE — GAUZE,SPONGE,4"X4",16PLY,XRAY,STRL,LF: Brand: MEDLINE

## (undated) DEVICE — SKIN PREP TRAY W/CHG: Brand: MEDLINE INDUSTRIES, INC.

## (undated) DEVICE — GLV SURG SIGNATURE ESSENTIAL PF LTX SZ6